# Patient Record
Sex: MALE | Race: BLACK OR AFRICAN AMERICAN | Employment: FULL TIME | URBAN - METROPOLITAN AREA
[De-identification: names, ages, dates, MRNs, and addresses within clinical notes are randomized per-mention and may not be internally consistent; named-entity substitution may affect disease eponyms.]

---

## 2018-04-03 ENCOUNTER — OFFICE VISIT (OUTPATIENT)
Dept: INTERNAL MEDICINE CLINIC | Age: 33
End: 2018-04-03

## 2018-04-03 VITALS
TEMPERATURE: 98.1 F | BODY MASS INDEX: 46.13 KG/M2 | HEIGHT: 67 IN | SYSTOLIC BLOOD PRESSURE: 207 MMHG | OXYGEN SATURATION: 98 % | DIASTOLIC BLOOD PRESSURE: 104 MMHG | RESPIRATION RATE: 20 BRPM | HEART RATE: 95 BPM | WEIGHT: 293.9 LBS

## 2018-04-03 DIAGNOSIS — I10 HTN (HYPERTENSION), MALIGNANT: Primary | ICD-10-CM

## 2018-04-03 DIAGNOSIS — H53.8 BLURRED VISION, BILATERAL: ICD-10-CM

## 2018-04-03 DIAGNOSIS — E66.01 MORBID OBESITY WITH BMI OF 45.0-49.9, ADULT (HCC): ICD-10-CM

## 2018-04-03 RX ORDER — METOPROLOL SUCCINATE 25 MG/1
25 TABLET, EXTENDED RELEASE ORAL
Qty: 30 TAB | Refills: 11 | Status: SHIPPED | OUTPATIENT
Start: 2018-04-03 | End: 2019-01-08

## 2018-04-03 RX ORDER — AMLODIPINE BESYLATE 5 MG/1
5 TABLET ORAL DAILY
Qty: 30 TAB | Refills: 11 | Status: SHIPPED | OUTPATIENT
Start: 2018-04-03 | End: 2019-01-08 | Stop reason: SDUPTHER

## 2018-04-03 NOTE — PROGRESS NOTES
SPORTS MEDICINE AND PRIMARY CARE  Jb Lira MD, [address] [address]  Phone:  [phone]  Fax: [phone]    Chief Complaint   Patient presents with    Establish Care       SUBJECTIVE:    Kumar Ramirez is a 29 y.o. male Patient comes in as a new patient, although we saw him several years ago. He complains of blurred vision. Denies other specific complaints and is seen for evaluation. He doesn't remember the last time his blood pressure was checked. Past Medical History:   Diagnosis Date    Blurred vision, bilateral     HTN (hypertension), malignant     Morbid obesity with BMI of 45.0-49.9, adult ([facility] [address]      History reviewed. No pertinent surgical history.   Not on File    REVIEW OF SYSTEMS:  General: negative for - chills or fever  ENT: negative for - headaches, nasal congestion or tinnitus  Respiratory: negative for - cough, hemoptysis, shortness of breath or wheezing  Cardiovascular : negative for - chest pain, edema, palpitations or shortness of breath  Gastrointestinal: negative for - abdominal pain, blood in stools, heartburn or nausea/vomiting  Genito-Urinary: no dysuria, trouble voiding, or hematuria  Musculoskeletal: negative for - gait disturbance, joint pain, joint stiffness or joint swelling  Neurological: no TIA or stroke symptoms  Hematologic: no bruises, no bleeding, no swollen glands  Integument: no lumps, mole changes, nail changes or rash  Endocrine:no malaise/lethargy or unexpected weight changes      Social History     Social History    Marital status: SINGLE     Spouse name: N/A    Number of children: N/A    Years of education: N/A     Social History Main Topics    Smoking status: Current Every Day Smoker    Smokeless tobacco: Never Used    Alcohol use No    Drug use: No    Sexual activity: Not Currently     Other Topics Concern    None     Social History Narrative    Medical History: KeloidObesity    Surgical History: Denies Past Surgical History    Hospitalization/Major Diagnostic Procedure: Denies Past Hospitalization    Family History: Mother: alive 37 yrs a/w Father: alive 36 yrs a/w Sister(s): alive Brother(s): alive Grandmother: alive 64 yrs Latonia Chaudhary 3 brother(s) , 2 sister(s) . Social History: Alcohol Use Patient does not use alcohol. Smoking Status Patient is a current every day smoker, Received cessation    counseling on: 11/18/2013. Marital Status: Single. Lives w ith: grandmother. Occupation/W ork: employed full time [employer] . Education/School: has highschool diploma. Mandaen: no.     History reviewed. No pertinent family history. OBJECTIVE:     Visit Vitals    BP (!) 207/104    Pulse 95    Temp 98.1 °F (36.7 °C) (Oral)    Resp 20    Ht 5' 7\" (1.702 m)    Wt 293 lb 14.4 oz (133.3 kg)    SpO2 98%    BMI 46.03 kg/m2     CONSTITUTIONAL: well , well nourished, appears age appropriate  EYES: perrla, eom intact  ENMT:moist mucous membranes, pharynx clear  NECK: supple. Thyroid normal  RESPIRATORY: Chest: clear bilaterally  CARDIOVASCULAR: Heart: regular rate and rhythm  GASTROINTESTINAL: Abdomen: soft, bowel sounds active  HEMATOLOGIC: no pathological lymph nodes palpated  MUSCULOSKELETAL: Extremities: no edema, pulse 1+   INTEGUMENT: No unusual rashes or suspicious skin lesions noted. Nails appear normal.  NEUROLOGIC: non-focal exam   MENTAL STATUS: alert and oriented, appropriate affect     No results found for any previous visit. ASSESSMENT:   1. HTN (hypertension), malignant    2. Blurred vision, bilateral    3. Morbid obesity with BMI of 45.0-49.9, adult (HCC)      BP control is grossly lacking. Will start him on a beta blocker, as well as a CCB and ask him to come back on Thursday for a blood pressure check. Will titrate his medications quickly to get his blood pressure down to normal.  Will refer him to opthalmology for evaluation of his blurred vision.   We encouraged physical activity 30 minutes five days a week and a heart healthy, weight reducing diet as discussed below. He'll return to the office on Thursday for a blood pressure check. Discussed the patient's BMI with him. The BMI follow up plan is as follows:     dietary management education, guidance, and counseling  encourage exercise  monitor weight  prescribed dietary intake    An After Visit Summary was printed and given to the patient. PLAN:  .  Orders Placed This Encounter    URINALYSIS W/ RFLX MICROSCOPIC    CBC WITH AUTOMATED DIFF    METABOLIC PANEL, COMPREHENSIVE    LIPID PANEL    HEMOGLOBIN A1C WITH EAG    AMB POC EKG ROUTINE W/ 12 LEADS, INTER & REP    2D ECHO COMPLETE ADULT (TTE) W OR WO CONTR    amLODIPine (NORVASC) 5 mg tablet    metoprolol succinate (TOPROL-XL) 25 mg XL tablet       Follow-up Disposition:  Return in about 2 days (around 4/5/2018) for bp check. ATTENTION:   This medical record was transcribed using an electronic medical records system. Although proofread, it may and can contain electronic and spelling errors. Other human spelling and other errors may be present. Corrections may be executed at a later time. Please feel free to contact us for any clarifications as needed.

## 2018-04-03 NOTE — PROGRESS NOTES
1. Have you been to the ER, urgent care clinic since your last visit? Hospitalized since your last visit? No    2. Have you seen or consulted any other health care providers outside of the [address] [place] [address] since your last visit? Include any pap smears or colon screening.  No     Complaints of vision lost

## 2018-04-03 NOTE — PATIENT INSTRUCTIONS
Body Mass Index: Care Instructions  Your Care Instructions    Body mass index (BMI) can help you see if your weight is raising your risk for health problems. It uses a formula to compare how much you weigh with how tall you are. · A BMI lower than 18.5 is considered underweight. · A BMI between 18.5 and 24.9 is considered healthy. · A BMI between 25 and 29.9 is considered overweight. A BMI of 30 or higher is considered obese. If your BMI is in the normal range, it means that you have a lower risk for weight-related health problems. If your BMI is in the overweight or obese range, you may be at increased risk for weight-related health problems, such as high blood pressure, heart disease, stroke, arthritis or joint pain, and diabetes. If your BMI is in the underweight range, you may be at increased risk for health problems such as fatigue, lower protection (immunity) against illness, muscle loss, bone loss, hair loss, and hormone problems. BMI is just one measure of your risk for weight-related health problems. You may be at higher risk for health problems if you are not active, you eat an unhealthy diet, or you drink too much alcohol or use tobacco products. Follow-up care is a key part of your treatment and safety. Be sure to make and go to all appointments, and call your doctor if you are having problems. It's also a good idea to know your test results and keep a list of the medicines you take. How can you care for yourself at home? · Practice healthy eating habits. This includes eating plenty of fruits, vegetables, whole grains, lean protein, and low-fat dairy. · If your doctor recommends it, get more exercise. Walking is a good choice. Bit by bit, increase the amount you walk every day. Try for at least 30 minutes on most days of the week. · Do not smoke. Smoking can increase your risk for health problems. If you need help quitting, talk to your doctor about stop-smoking programs and medicines. These can increase your chances of quitting for good. · Limit alcohol to 2 drinks a day for men and 1 drink a day for women. Too much alcohol can cause health problems. If you have a BMI higher than 25  · Your doctor may do other tests to check your risk for weight-related health problems. This may include measuring the distance around your waist. A waist measurement of more than 40 inches in men or 35 inches in women can increase the risk of weight-related health problems. · Talk with your doctor about steps you can take to stay healthy or improve your health. You may need to make lifestyle changes to lose weight and stay healthy, such as changing your diet and getting regular exercise. If you have a BMI lower than 18.5  · Your doctor may do other tests to check your risk for health problems. · Talk with your doctor about steps you can take to stay healthy or improve your health. You may need to make lifestyle changes to gain or maintain weight and stay healthy, such as getting more healthy foods in your diet and doing exercises to build muscle. Where can you learn more? Go to http://constantin-kailash.info/. Enter S176 in the search box to learn more about \"Body Mass Index: Care Instructions. \"  Current as of: October 13, 2016  Content Version: 11.4  © 1101-8214 Healthwise, Incorporated. Care instructions adapted under license by [employer] (which disclaims liability or warranty for this information). If you have questions about a medical condition or this instruction, always ask your healthcare professional. [address] any warranty or liability for your use of this information.

## 2018-04-03 NOTE — MR AVS SNAPSHOT
[address] [place] [address] [id number] 
[phone] Patient: Alicja Mancera MRN: [id number] :1985 Visit Information Date & Time Provider Department Dept. Phone Encounter #  
 4/3/2018 12:30 PM Kitty Modi MD [facility] Medicine and [facility] [phone] [id number] Follow-up Instructions Return in about 2 days (around 2018) for bp check. Follow-up and Disposition History Upcoming Health Maintenance Date Due  
 PAP AKA CERVICAL CYTOLOGY 2008 DTaP/Tdap/Td series (2 - Td) 4/3/2028 Allergies as of 4/3/2018  Review Complete On: 4/3/2018 By: Sun Ceballos LPN Not on File Current Immunizations  Never Reviewed No immunizations on file. Not reviewed this visit You Were Diagnosed With   
  
 Codes Comments HTN (hypertension), malignant    -  Primary ICD-10-CM: I10 
ICD-9-CM: 401.0 Blurred vision, bilateral     ICD-10-CM: H53.8 ICD-9-CM: 368.8 Morbid obesity with BMI of 45.0-49.9, adult (HCC)     ICD-10-CM: E66.01, Z68.42 
ICD-9-CM: 278.01, V85.42 Vitals BP Pulse Temp Resp Height(growth percentile) Weight(growth percentile) (!) 207/104 95 98.1 °F (36.7 °C) (Oral) 20 5' 7\" (1.702 m) 293 lb 14.4 oz (133.3 kg) SpO2 BMI Smoking Status 98% 46.03 kg/m2 Current Every Day Smoker Vitals History BMI and BSA Data Body Mass Index Body Surface Area 46.03 kg/m 2 2.51 m 2 Preferred Pharmacy Pharmacy Name Phone [id number] [address] [phone] Your Updated Medication List  
  
   
This list is accurate as of 4/3/18  2:48 PM.  Always use your most recent med list. amLODIPine 5 mg tablet Commonly known as:  Sowmya Grebe Take 1 Tab by mouth daily. metoprolol succinate 25 mg XL tablet Commonly known as:  TOPROL-XL  
 Take 1 Tab by mouth nightly. Prescriptions Sent to Pharmacy Refills  
 amLODIPine (NORVASC) 5 mg tablet 11 Sig: Take 1 Tab by mouth daily. Class: Normal  
 Pharmacy: [facility]/pharmacy [facility] [address] Ph #: [phone] Route: Oral  
 metoprolol succinate (TOPROL-XL) 25 mg XL tablet 11 Sig: Take 1 Tab by mouth nightly. Class: Normal  
 Pharmacy: [facility]/pharmacy [address] Ph #: [phone] Route: Oral  
  
We Performed the Following AMB POC EKG ROUTINE W/ 12 LEADS, INTER & REP [61839 CPT(R)] CBC WITH AUTOMATED DIFF [59397 CPT(R)] COLLECTION VENOUS BLOOD,VENIPUNCTURE [id number] CPT(R)] HEMOGLOBIN A1C WITH EAG [10446 CPT(R)] LIPID PANEL [57103 CPT(R)] METABOLIC PANEL, COMPREHENSIVE [91924 CPT(R)] URINALYSIS W/ RFLX MICROSCOPIC [30520 CPT(R)] Follow-up Instructions Return in about 2 days (around 4/5/2018) for bp check. To-Do List   
 04/03/2018 ECHO:  2D ECHO COMPLETE ADULT (TTE) W OR WO CONTR Patient Instructions Body Mass Index: Care Instructions Your Care Instructions Body mass index (BMI) can help you see if your weight is raising your risk for health problems. It uses a formula to compare how much you weigh with how tall you are. · A BMI lower than 18.5 is considered underweight. · A BMI between 18.5 and 24.9 is considered healthy. · A BMI between 25 and 29.9 is considered overweight. A BMI of 30 or higher is considered obese. If your BMI is in the normal range, it means that you have a lower risk for weight-related health problems.  If your BMI is in the overweight or obese range, you may be at increased risk for weight-related health problems, such as high blood pressure, heart disease, stroke, arthritis or joint pain, and diabetes. If your BMI is in the underweight range, you may be at increased risk for health problems such as fatigue, lower protection (immunity) against illness, muscle loss, bone loss, hair loss, and hormone problems. BMI is just one measure of your risk for weight-related health problems. You may be at higher risk for health problems if you are not active, you eat an unhealthy diet, or you drink too much alcohol or use tobacco products. Follow-up care is a key part of your treatment and safety. Be sure to make and go to all appointments, and call your doctor if you are having problems. It's also a good idea to know your test results and keep a list of the medicines you take. How can you care for yourself at home? · Practice healthy eating habits. This includes eating plenty of fruits, vegetables, whole grains, lean protein, and low-fat dairy. · If your doctor recommends it, get more exercise. Walking is a good choice. Bit by bit, increase the amount you walk every day. Try for at least 30 minutes on most days of the week. · Do not smoke. Smoking can increase your risk for health problems. If you need help quitting, talk to your doctor about stop-smoking programs and medicines. These can increase your chances of quitting for good. · Limit alcohol to 2 drinks a day for men and 1 drink a day for women. Too much alcohol can cause health problems. If you have a BMI higher than 25 · Your doctor may do other tests to check your risk for weight-related health problems. This may include measuring the distance around your waist. A waist measurement of more than 40 inches in men or 35 inches in women can increase the risk of weight-related health problems. · Talk with your doctor about steps you can take to stay healthy or improve your health. You may need to make lifestyle changes to lose weight and stay healthy, such as changing your diet and getting regular exercise. If you have a BMI lower than 18.5 · Your doctor may do other tests to check your risk for health problems. · Talk with your doctor about steps you can take to stay healthy or improve your health. You may need to make lifestyle changes to gain or maintain weight and stay healthy, such as getting more healthy foods in your diet and doing exercises to build muscle. Where can you learn more? Go to http://constantin-kailash.info/. Enter S176 in the search box to learn more about \"Body Mass Index: Care Instructions. \" Current as of: October 13, 2016 Content Version: 11.4 © 7151-7646 [employer]. Care instructions adapted under license by [employer] (which disclaims liability or warranty for this information). If you have questions about a medical condition or this instruction, always ask your healthcare professional. [address] any warranty or liability for your use of this information. Introducing [facility]! Jose Norris introduces [employer] patient portal. Now you can access parts of your medical record, email your doctor's office, and request medication refills online. 1. In your internet browser, go to [email or web address] 2. Click on the First Time User? Click Here link in the Sign In box. You will see the New Member Sign Up page. 3. Enter your [employer] Access Code exactly as it appears below. You will not need to use this code after youve completed the sign-up process. If you do not sign up before the expiration date, you must request a new code. · [employer] Access Code: [email or web address] Expires: 7/2/2018  1:49 PM 
 
4. Enter the last four digits of your Social Security Number (xxxx) and Date of Birth (mm/dd/yyyy) as indicated and click Submit. You will be taken to the next sign-up page. 5. Create a [employer] ID.  This will be your [employer] login ID and cannot be changed, so think of one that is secure and easy to remember. 6. Create a [employer] password. You can change your password at any time. 7. Enter your Password Reset Question and Answer. This can be used at a later time if you forget your password. 8. Enter your e-mail address. You will receive e-mail notification when new information is available in [address] 9. Click Sign Up. You can now view and download portions of your medical record. 10. Click the Download Summary menu link to download a portable copy of your medical information. If you have questions, please visit the Frequently Asked Questions section of the [employer] website. Remember, [employer] is NOT to be used for urgent needs. For medical emergencies, dial 911. Now available from your iPhone and Android! Please provide this summary of care documentation to your next provider. Your primary care clinician is listed as Argelia Fountain. If you have any questions after today's visit, please call [phone].

## 2018-04-04 DIAGNOSIS — E11.9 TYPE 2 DIABETES MELLITUS WITHOUT COMPLICATION, WITHOUT LONG-TERM CURRENT USE OF INSULIN (HCC): Primary | ICD-10-CM

## 2018-04-04 LAB
ALBUMIN SERPL-MCNC: 4.3 G/DL (ref 3.5–5.5)
ALBUMIN/GLOB SERPL: 1.3 {RATIO} (ref 1.2–2.2)
ALP SERPL-CCNC: 91 IU/L (ref 39–117)
ALT SERPL-CCNC: 25 IU/L (ref 0–44)
APPEARANCE UR: CLEAR
AST SERPL-CCNC: 16 IU/L (ref 0–40)
BACTERIA #/AREA URNS HPF: ABNORMAL /[HPF]
BASOPHILS # BLD AUTO: 0 X10E3/UL (ref 0–0.2)
BASOPHILS NFR BLD AUTO: 1 %
BILIRUB SERPL-MCNC: 0.3 MG/DL (ref 0–1.2)
BILIRUB UR QL STRIP: NEGATIVE
BUN SERPL-MCNC: 15 MG/DL (ref 6–20)
BUN/CREAT SERPL: 15 (ref 9–20)
CALCIUM SERPL-MCNC: 9.4 MG/DL (ref 8.7–10.2)
CASTS URNS QL MICRO: ABNORMAL /LPF
CHLORIDE SERPL-SCNC: 99 MMOL/L (ref 96–106)
CHOLEST SERPL-MCNC: 207 MG/DL (ref 100–199)
CO2 SERPL-SCNC: 23 MMOL/L (ref 18–29)
COLOR UR: YELLOW
CREAT SERPL-MCNC: 1 MG/DL (ref 0.76–1.27)
CRYSTALS URNS MICRO: ABNORMAL
EOSINOPHIL # BLD AUTO: 0.1 X10E3/UL (ref 0–0.4)
EOSINOPHIL NFR BLD AUTO: 2 %
EPI CELLS #/AREA URNS HPF: ABNORMAL /HPF
ERYTHROCYTE [DISTWIDTH] IN BLOOD BY AUTOMATED COUNT: 14.3 % (ref 12.3–15.4)
EST. AVERAGE GLUCOSE BLD GHB EST-MCNC: 157 MG/DL
GFR SERPLBLD CREATININE-BSD FMLA CKD-EPI: 101 ML/MIN/1.73
GFR SERPLBLD CREATININE-BSD FMLA CKD-EPI: 116 ML/MIN/1.73
GLOBULIN SER CALC-MCNC: 3.3 G/DL (ref 1.5–4.5)
GLUCOSE SERPL-MCNC: 114 MG/DL (ref 65–99)
GLUCOSE UR QL: ABNORMAL
HBA1C MFR BLD: 7.1 % (ref 4.8–5.6)
HCT VFR BLD AUTO: 41.4 % (ref 37.5–51)
HDLC SERPL-MCNC: 54 MG/DL
HGB BLD-MCNC: 14.1 G/DL (ref 13–17.7)
HGB UR QL STRIP: NEGATIVE
IMM GRANULOCYTES # BLD: 0 X10E3/UL (ref 0–0.1)
IMM GRANULOCYTES NFR BLD: 0 %
KETONES UR QL STRIP: NEGATIVE
LDLC SERPL CALC-MCNC: 132 MG/DL (ref 0–99)
LEUKOCYTE ESTERASE UR QL STRIP: NEGATIVE
LYMPHOCYTES # BLD AUTO: 2.8 X10E3/UL (ref 0.7–3.1)
LYMPHOCYTES NFR BLD AUTO: 32 %
MCH RBC QN AUTO: 28.4 PG (ref 26.6–33)
MCHC RBC AUTO-ENTMCNC: 34.1 G/DL (ref 31.5–35.7)
MCV RBC AUTO: 83 FL (ref 79–97)
MICRO URNS: ABNORMAL
MONOCYTES # BLD AUTO: 0.6 X10E3/UL (ref 0.1–0.9)
MONOCYTES NFR BLD AUTO: 7 %
MUCOUS THREADS URNS QL MICRO: PRESENT
NEUTROPHILS # BLD AUTO: 5 X10E3/UL (ref 1.4–7)
NEUTROPHILS NFR BLD AUTO: 58 %
NITRITE UR QL STRIP: NEGATIVE
PH UR STRIP: 5 [PH] (ref 5–7.5)
PLATELET # BLD AUTO: 431 X10E3/UL (ref 150–379)
POTASSIUM SERPL-SCNC: 4.2 MMOL/L (ref 3.5–5.2)
PROT SERPL-MCNC: 7.6 G/DL (ref 6–8.5)
PROT UR QL STRIP: ABNORMAL
RBC # BLD AUTO: 4.97 X10E6/UL (ref 4.14–5.8)
RBC #/AREA URNS HPF: ABNORMAL /HPF
SODIUM SERPL-SCNC: 141 MMOL/L (ref 134–144)
SP GR UR: 1.03 (ref 1–1.03)
TRIGL SERPL-MCNC: 106 MG/DL (ref 0–149)
UNIDENT CRYS URNS QL MICRO: PRESENT
UROBILINOGEN UR STRIP-MCNC: 0.2 MG/DL (ref 0.2–1)
VLDLC SERPL CALC-MCNC: 21 MG/DL (ref 5–40)
WBC # BLD AUTO: 8.5 X10E3/UL (ref 3.4–10.8)
WBC #/AREA URNS HPF: ABNORMAL /HPF

## 2018-04-04 RX ORDER — METFORMIN HYDROCHLORIDE 500 MG/1
500 TABLET, EXTENDED RELEASE ORAL
Qty: 30 TAB | Refills: 11 | Status: SHIPPED | OUTPATIENT
Start: 2018-04-04 | End: 2019-05-17 | Stop reason: SDUPTHER

## 2018-04-26 ENCOUNTER — TELEPHONE (OUTPATIENT)
Dept: INTERNAL MEDICINE CLINIC | Age: 33
End: 2018-04-26

## 2018-12-20 ENCOUNTER — OFFICE VISIT (OUTPATIENT)
Dept: INTERNAL MEDICINE CLINIC | Age: 33
End: 2018-12-20

## 2018-12-20 VITALS
BODY MASS INDEX: 43.76 KG/M2 | TEMPERATURE: 97.8 F | RESPIRATION RATE: 18 BRPM | SYSTOLIC BLOOD PRESSURE: 145 MMHG | WEIGHT: 278.8 LBS | HEART RATE: 65 BPM | HEIGHT: 67 IN | OXYGEN SATURATION: 98 % | DIASTOLIC BLOOD PRESSURE: 84 MMHG

## 2018-12-20 DIAGNOSIS — I34.0 MILD MITRAL INSUFFICIENCY: ICD-10-CM

## 2018-12-20 DIAGNOSIS — R06.02 SOB (SHORTNESS OF BREATH): ICD-10-CM

## 2018-12-20 DIAGNOSIS — E11.9 TYPE 2 DIABETES MELLITUS WITHOUT COMPLICATION, WITHOUT LONG-TERM CURRENT USE OF INSULIN (HCC): ICD-10-CM

## 2018-12-20 DIAGNOSIS — I51.89 GRADE II DIASTOLIC DYSFUNCTION: ICD-10-CM

## 2018-12-20 DIAGNOSIS — E66.01 MORBID OBESITY WITH BMI OF 45.0-49.9, ADULT (HCC): ICD-10-CM

## 2018-12-20 DIAGNOSIS — I10 HTN (HYPERTENSION), MALIGNANT: Primary | ICD-10-CM

## 2018-12-20 DIAGNOSIS — I27.20 PULMONARY HYPERTENSION, MODERATE TO SEVERE (HCC): ICD-10-CM

## 2018-12-20 NOTE — PROGRESS NOTES
1. Have you been to the ER, urgent care clinic since your last visit? Hospitalized since your last visit? Yes When: 12-9-18 Reason for visit: SOB    2. Have you seen or consulted any other health care providers outside of the [address] [place] [address] since your last visit? Include any pap smears or colon screening.  Yes Where: [facility]

## 2018-12-20 NOTE — PATIENT INSTRUCTIONS
Body Mass Index: Care Instructions  Your Care Instructions    Body mass index (BMI) can help you see if your weight is raising your risk for health problems. It uses a formula to compare how much you weigh with how tall you are. · A BMI lower than 18.5 is considered underweight. · A BMI between 18.5 and 24.9 is considered healthy. · A BMI between 25 and 29.9 is considered overweight. A BMI of 30 or higher is considered obese. If your BMI is in the normal range, it means that you have a lower risk for weight-related health problems. If your BMI is in the overweight or obese range, you may be at increased risk for weight-related health problems, such as high blood pressure, heart disease, stroke, arthritis or joint pain, and diabetes. If your BMI is in the underweight range, you may be at increased risk for health problems such as fatigue, lower protection (immunity) against illness, muscle loss, bone loss, hair loss, and hormone problems. BMI is just one measure of your risk for weight-related health problems. You may be at higher risk for health problems if you are not active, you eat an unhealthy diet, or you drink too much alcohol or use tobacco products. Follow-up care is a key part of your treatment and safety. Be sure to make and go to all appointments, and call your doctor if you are having problems. It's also a good idea to know your test results and keep a list of the medicines you take. How can you care for yourself at home? · Practice healthy eating habits. This includes eating plenty of fruits, vegetables, whole grains, lean protein, and low-fat dairy. · If your doctor recommends it, get more exercise. Walking is a good choice. Bit by bit, increase the amount you walk every day. Try for at least 30 minutes on most days of the week. · Do not smoke. Smoking can increase your risk for health problems. If you need help quitting, talk to your doctor about stop-smoking programs and medicines. These can increase your chances of quitting for good. · Limit alcohol to 2 drinks a day for men and 1 drink a day for women. Too much alcohol can cause health problems. If you have a BMI higher than 25  · Your doctor may do other tests to check your risk for weight-related health problems. This may include measuring the distance around your waist. A waist measurement of more than 40 inches in men or 35 inches in women can increase the risk of weight-related health problems. · Talk with your doctor about steps you can take to stay healthy or improve your health. You may need to make lifestyle changes to lose weight and stay healthy, such as changing your diet and getting regular exercise. If you have a BMI lower than 18.5  · Your doctor may do other tests to check your risk for health problems. · Talk with your doctor about steps you can take to stay healthy or improve your health. You may need to make lifestyle changes to gain or maintain weight and stay healthy, such as getting more healthy foods in your diet and doing exercises to build muscle. Where can you learn more? Go to http://constantin-kailash.info/. Enter S176 in the search box to learn more about \"Body Mass Index: Care Instructions. \"  Current as of: October 13, 2016  Content Version: 11.4  © 8338-3069 Healthwise, Incorporated. Care instructions adapted under license by [employer] (which disclaims liability or warranty for this information). If you have questions about a medical condition or this instruction, always ask your healthcare professional. [address] any warranty or liability for your use of this information.

## 2018-12-20 NOTE — PROGRESS NOTES
SPORTS MEDICINE AND PRIMARY CARE  Lina Hyde MD, [address] [address]  Phone:  [phone]  Fax: [phone]       Chief Complaint   Patient presents with   [facility] Follow Up   . SUBJECTIVE:    Kalyan Julien is a 34 y.o. male Patient returns today with known history of diabetes mellitus type 2, morbid obesity, although she lost 15 pounds, primary hypertension, and is seen for evaluation. Patient returns today after a long absence. He comes in with his grandmother, Gloria Alejandro. He brings in his paperwork which indicates he was admitted on 12/04/18 and discharged on 12/10/18 from [facility]. He was admitted to ICU on BIPAP for respiratory support and IV Cardene drip for blood pressure control. He had acute kidney injury secondary to poorly controlled blood pressure and a component of congestive heart failure. Cardiology recommended that he continue medications and that he should have aggressive treatment for congestive heart failure to relieve the pulmonary hypertension. He was subsequently weaned off BIPAP and on room air his oxygen returned to normal.  His renal dysfunction was improved. They subsequently discharged him. During the hospitalization his echocardiogram revealed ejection fraction of 60-65%, left ventricular wall thickness, grade 2 diastolic dysfunction, mild mitral regurgitation, left atrium was dilated, moderate pulmonary hypertension. He had hypokalemia, which resolved. He was not taking any of his medications at home for his diabetes. Hemoglobin A1c was 6.7. They noted he was medically noncompliant and suggested medical follow up. He was advised to stop Amlodipine and Metoprolol XL 25. He was advised to continue Metformin 500 mg b.i.d., Lisinopril 10 mg daily, Metoprolol Tartrate 50 mg, Amlodipine 10 mg, Clonidine 0.1 mg t.i.d., and aspirin 81 mg daily.   He was to see Dr. Lizzy Collazo MD in 1-2 weeks and [facility] in 1-2 weeks. Patient comes in today saying he feels better and is seen for evaluation. Current Outpatient Medications   Medication Sig Dispense Refill    metFORMIN ER (GLUCOPHAGE XR) 500 mg tablet Take 1 Tab by mouth daily (with dinner). 30 Tab 11    amLODIPine (NORVASC) 5 mg tablet Take 1 Tab by mouth daily. 30 Tab 11    metoprolol succinate (TOPROL-XL) 25 mg XL tablet Take 1 Tab by mouth nightly. 27 Tab 11     Past Medical History:   Diagnosis Date    Blurred vision, bilateral     DM2 (diabetes mellitus, type 2) ([facility] 04/03/2018    Grade II diastolic dysfunction 88/76/7158    HTN (hypertension), malignant     Morbid obesity with BMI of 45.0-49.9, adult ([facility]     Non-compliance 12/10/2018    Pulmonary hypertension, moderate to severe ([facility] 12/10/2018    chip     History reviewed. No pertinent surgical history.   Not on File      REVIEW OF SYSTEMS:  General: negative for - chills or fever  ENT: negative for - headaches, nasal congestion or tinnitus  Respiratory: negative for - cough, hemoptysis, shortness of breath or wheezing  Cardiovascular : negative for - chest pain, edema, palpitations or shortness of breath  Gastrointestinal: negative for - abdominal pain, blood in stools, heartburn or nausea/vomiting  Genito-Urinary: no dysuria, trouble voiding, or hematuria  Musculoskeletal: negative for - gait disturbance, joint pain, joint stiffness or joint swelling  Neurological: no TIA or stroke symptoms  Hematologic: no bruises, no bleeding, no swollen glands  Integument: no lumps, mole changes, nail changes or rash  Endocrine: no malaise/lethargy or unexpected weight changes      Social History     Socioeconomic History    Marital status: SINGLE     Spouse name: Not on file    Number of children: Not on file    Years of education: Not on file    Highest education level: Not on file   Tobacco Use    Smoking status: Former Smoker     Last attempt to quit: 12/14/2018     Years since quitting: 0.0    Smokeless tobacco: Never Used   Substance and Sexual Activity    Alcohol use: No    Drug use: No    Sexual activity: Not Currently   Social History Narrative    Medical History: KeloidObesity    Surgical History: Denies Past Surgical History    Hospitalization/Major Diagnostic Procedure: Denies Past Hospitalization    Family History: Mother: alive 37 yrs a/w Father: alive 36 yrs a/w Sister(s): alive Brother(s): alive Grandmother: alive 64 yrs Luisa Marely 3 brother(s) , 2 sister(s) . Social History: Alcohol Use Patient does not use alcohol. Smoking Status Patient is a current every day smoker, Received cessation    counseling on: 11/18/2013. Marital Status: Single. Lives w ith: grandmother. Occupation/W ork: employed full time [employer] . Education/School: has highschool diploma. Jewish: no.     History reviewed. No pertinent family history. OBJECTIVE:    Visit Vitals  /84   Pulse 65   Temp 97.8 °F (36.6 °C) (Oral)   Resp 18   Ht 5' 7\" (1.702 m)   Wt 278 lb 12.8 oz (126.5 kg)   SpO2 98%   BMI 43.67 kg/m²     CONSTITUTIONAL: well , well nourished, appears age appropriate  EYES: perrla, eom intact  ENMT:moist mucous membranes, pharynx clear  NECK: supple. Thyroid normal  RESPIRATORY: Chest: clear bilaterally   CARDIOVASCULAR: Heart: regular rate and rhythm  GASTROINTESTINAL: Abdomen: soft, bowel sounds active  HEMATOLOGIC: no pathological lymph nodes palpated  MUSCULOSKELETAL: Extremities: no edema, pulse 1+   INTEGUMENT: No unusual rashes or suspicious skin lesions noted. Nails appear normal.  NEUROLOGIC: non-focal exam   MENTAL STATUS: alert and oriented, appropriate affect           ASSESSMENT:  1. HTN (hypertension), malignant    2. Morbid obesity with BMI of 45.0-49.9, adult ([address]    3. Type 2 diabetes mellitus without complication, without long-term current use of insulin (HCC)    4. Pulmonary hypertension, moderate to severe ([address]    5.  Grade II diastolic dysfunction    6. Mild mitral insufficiency    7. SOB (shortness of breath)      His blood pressure control is lacking. Before we make a change we will ask him to come back in 2-3 weeks and if it remains elevated then we will either increase Lisinopril or change it to an ARB. The consideration of addition of Bidil will also be made. We continue to encourage physical activity and a heart healthy, weight reducing diet. Will assess blood sugar control with Hgb A1c and appropriate laboratory studies to see the progress of his renal failure. He will be back to see me formally in three months. Discussed the patient's BMI with him. The BMI follow up plan is as follows:     dietary management education, guidance, and counseling  encourage exercise  monitor weight  prescribed dietary intake    I have discussed the diagnosis with the patient and the intended plan as seen in the  orders above. The patient understands and agees with the plan. The patient has   received an after visit summary and questions were answered concerning  future plans  Patient labs and/or xrays were reviewed  Past records were reviewed. PLAN:  .  Orders Placed This Encounter    HEMOGLOBIN A1C WITH EAG    MICROALBUMIN, UR, RAND W/ MICROALB/CREAT RATIO    URINALYSIS W/ RFLX MICROSCOPIC    CBC WITH AUTOMATED DIFF    METABOLIC PANEL, COMPREHENSIVE    LIPID PANEL    BNP    REFERRAL TO OPHTHALMOLOGY    REFERRAL TO CARDIOLOGY       Follow-up Disposition:  Return in about 3 weeks (around 1/10/2019) for bp check. ATTENTION:   This medical record was transcribed using an electronic medical records system. Although proofread, it may and can contain electronic and spelling errors. Other human spelling and other errors may be present. Corrections may be executed at a later time. Please feel free to contact us for any clarifications as needed.

## 2018-12-21 LAB
ALBUMIN SERPL-MCNC: 4.2 G/DL (ref 3.5–5.5)
ALBUMIN/CREAT UR: 21.7 MG/G CREAT (ref 0–30)
ALBUMIN/GLOB SERPL: 1.3 {RATIO} (ref 1.2–2.2)
ALP SERPL-CCNC: 85 IU/L (ref 39–117)
ALT SERPL-CCNC: 29 IU/L (ref 0–44)
APPEARANCE UR: CLEAR
AST SERPL-CCNC: 12 IU/L (ref 0–40)
BASOPHILS # BLD AUTO: 0 X10E3/UL (ref 0–0.2)
BASOPHILS NFR BLD AUTO: 0 %
BILIRUB SERPL-MCNC: 0.2 MG/DL (ref 0–1.2)
BILIRUB UR QL STRIP: NEGATIVE
BNP SERPL-MCNC: 81 PG/ML (ref 0–100)
BUN SERPL-MCNC: 19 MG/DL (ref 6–20)
BUN/CREAT SERPL: 13 (ref 9–20)
CALCIUM SERPL-MCNC: 9.8 MG/DL (ref 8.7–10.2)
CHLORIDE SERPL-SCNC: 103 MMOL/L (ref 96–106)
CHOLEST SERPL-MCNC: 171 MG/DL (ref 100–199)
CO2 SERPL-SCNC: 23 MMOL/L (ref 20–29)
COLOR UR: YELLOW
CREAT SERPL-MCNC: 1.45 MG/DL (ref 0.76–1.27)
CREAT UR-MCNC: 171.4 MG/DL
EOSINOPHIL # BLD AUTO: 0.1 X10E3/UL (ref 0–0.4)
EOSINOPHIL NFR BLD AUTO: 2 %
ERYTHROCYTE [DISTWIDTH] IN BLOOD BY AUTOMATED COUNT: 14.2 % (ref 12.3–15.4)
EST. AVERAGE GLUCOSE BLD GHB EST-MCNC: 143 MG/DL
GLOBULIN SER CALC-MCNC: 3.3 G/DL (ref 1.5–4.5)
GLUCOSE SERPL-MCNC: 106 MG/DL (ref 65–99)
GLUCOSE UR QL: NEGATIVE
HBA1C MFR BLD: 6.6 % (ref 4.8–5.6)
HCT VFR BLD AUTO: 35.8 % (ref 37.5–51)
HDLC SERPL-MCNC: 45 MG/DL
HGB BLD-MCNC: 11.5 G/DL (ref 13–17.7)
HGB UR QL STRIP: NEGATIVE
IMM GRANULOCYTES # BLD: 0 X10E3/UL (ref 0–0.1)
IMM GRANULOCYTES NFR BLD: 0 %
KETONES UR QL STRIP: NEGATIVE
LDLC SERPL CALC-MCNC: 111 MG/DL (ref 0–99)
LEUKOCYTE ESTERASE UR QL STRIP: NEGATIVE
LYMPHOCYTES # BLD AUTO: 2.7 X10E3/UL (ref 0.7–3.1)
LYMPHOCYTES NFR BLD AUTO: 39 %
MCH RBC QN AUTO: 27.4 PG (ref 26.6–33)
MCHC RBC AUTO-ENTMCNC: 32.1 G/DL (ref 31.5–35.7)
MCV RBC AUTO: 85 FL (ref 79–97)
MICRO URNS: NORMAL
MICROALBUMIN UR-MCNC: 37.2 UG/ML
MONOCYTES # BLD AUTO: 0.5 X10E3/UL (ref 0.1–0.9)
MONOCYTES NFR BLD AUTO: 8 %
NEUTROPHILS # BLD AUTO: 3.5 X10E3/UL (ref 1.4–7)
NEUTROPHILS NFR BLD AUTO: 51 %
NITRITE UR QL STRIP: NEGATIVE
PH UR STRIP: 5.5 [PH] (ref 5–7.5)
PLATELET # BLD AUTO: 625 X10E3/UL (ref 150–379)
POTASSIUM SERPL-SCNC: 5.1 MMOL/L (ref 3.5–5.2)
PROT SERPL-MCNC: 7.5 G/DL (ref 6–8.5)
PROT UR QL STRIP: NEGATIVE
RBC # BLD AUTO: 4.2 X10E6/UL (ref 4.14–5.8)
SODIUM SERPL-SCNC: 140 MMOL/L (ref 134–144)
SP GR UR: 1.02 (ref 1–1.03)
TRIGL SERPL-MCNC: 76 MG/DL (ref 0–149)
UROBILINOGEN UR STRIP-MCNC: 0.2 MG/DL (ref 0.2–1)
VLDLC SERPL CALC-MCNC: 15 MG/DL (ref 5–40)
WBC # BLD AUTO: 6.9 X10E3/UL (ref 3.4–10.8)

## 2018-12-22 RX ORDER — FERROUS GLUCONATE 324(37.5)
324 TABLET ORAL EVERY OTHER DAY
Qty: 30 TAB | Refills: 11 | Status: SHIPPED | OUTPATIENT
Start: 2018-12-22 | End: 2020-04-15 | Stop reason: SDUPTHER

## 2019-01-08 RX ORDER — CLONIDINE HYDROCHLORIDE 0.3 MG/1
0.3 TABLET ORAL
Qty: 30 TAB | Refills: 11 | Status: SHIPPED | OUTPATIENT
Start: 2019-01-08 | End: 2019-01-11 | Stop reason: SDUPTHER

## 2019-01-08 RX ORDER — METOPROLOL SUCCINATE 50 MG/1
50 TABLET, EXTENDED RELEASE ORAL
Qty: 30 TAB | Refills: 11 | Status: SHIPPED | OUTPATIENT
Start: 2019-01-08 | End: 2019-06-26

## 2019-01-08 RX ORDER — LOSARTAN POTASSIUM 100 MG/1
100 TABLET ORAL DAILY
Qty: 30 TAB | Refills: 11 | Status: SHIPPED | OUTPATIENT
Start: 2019-01-08 | End: 2020-04-15 | Stop reason: SDUPTHER

## 2019-01-08 RX ORDER — AMLODIPINE BESYLATE 5 MG/1
5 TABLET ORAL DAILY
Qty: 30 TAB | Refills: 11 | Status: SHIPPED | OUTPATIENT
Start: 2019-01-08 | End: 2019-02-26 | Stop reason: SDUPTHER

## 2019-01-11 ENCOUNTER — CLINICAL SUPPORT (OUTPATIENT)
Dept: INTERNAL MEDICINE CLINIC | Age: 34
End: 2019-01-11

## 2019-01-11 VITALS
WEIGHT: 283 LBS | BODY MASS INDEX: 44.32 KG/M2 | DIASTOLIC BLOOD PRESSURE: 116 MMHG | SYSTOLIC BLOOD PRESSURE: 175 MMHG | HEART RATE: 90 BPM

## 2019-01-11 DIAGNOSIS — G47.33 OSA (OBSTRUCTIVE SLEEP APNEA): Primary | ICD-10-CM

## 2019-01-11 DIAGNOSIS — I10 HTN (HYPERTENSION), MALIGNANT: Primary | ICD-10-CM

## 2019-01-11 RX ORDER — CLONIDINE HYDROCHLORIDE 0.3 MG/1
0.3 TABLET ORAL
Qty: 30 TAB | Refills: 11 | Status: SHIPPED | OUTPATIENT
Start: 2019-01-11 | End: 2020-01-29 | Stop reason: SDUPTHER

## 2019-01-11 NOTE — PROGRESS NOTES
Pt came in today for BP check. Pt states he is taking   Amlodipine 5 mg 1 tab PO daily  Losartan 100 mg 1 tab PO daily  Metoprolol Succinate 50 mg 1 tab PO @ night   Weight- 283lb   Pulse- 90  BP- 175/116  Per Dr. Catrachita Ashby pt is to start taking Clonidine 0.3 mg 1 tab PO @ night along with other blood pressure medications listed above. Pt is to return in 1 week for another bp check.      Beverley Meza

## 2019-01-21 ENCOUNTER — CLINICAL SUPPORT (OUTPATIENT)
Dept: INTERNAL MEDICINE CLINIC | Age: 34
End: 2019-01-21

## 2019-01-21 VITALS
DIASTOLIC BLOOD PRESSURE: 133 MMHG | BODY MASS INDEX: 43.45 KG/M2 | WEIGHT: 277.4 LBS | SYSTOLIC BLOOD PRESSURE: 196 MMHG | HEART RATE: 80 BPM

## 2019-01-21 DIAGNOSIS — I10 HTN (HYPERTENSION), MALIGNANT: Primary | ICD-10-CM

## 2019-01-21 RX ORDER — MINOXIDIL 2.5 MG/1
2.5 TABLET ORAL DAILY
Qty: 602 TAB | Refills: 2 | Status: SHIPPED | OUTPATIENT
Start: 2019-01-21 | End: 2019-03-27

## 2019-01-21 RX ORDER — FUROSEMIDE 40 MG/1
40 TABLET ORAL DAILY
Qty: 30 TAB | Refills: 3 | Status: SHIPPED | OUTPATIENT
Start: 2019-01-21 | End: 2019-03-04 | Stop reason: SDUPTHER

## 2019-01-21 NOTE — PROGRESS NOTES
Pt came in today for a bp check. Pt states he is taking  Amlodipine 5 mg 1 tab PO daily  Clonidine 0.3 mg 1 tab PO nightly  Losartan 100 mg 1 tab PO daily  Metoprolol Succinate 50 mg 1 tab PO nightly  Weight- 277.4lb  Pulse- 80  BP- 196/133  Per Dr. Tien Kirkpatrick pt is to continue taking bp meds as directed above as well as add Minoxidil 2.5 mg 1 tab PO 2x daily. Pt is to return in 1 week for another bp check and bmp.     Rnean Del Cid

## 2019-01-28 ENCOUNTER — CLINICAL SUPPORT (OUTPATIENT)
Dept: INTERNAL MEDICINE CLINIC | Age: 34
End: 2019-01-28

## 2019-01-28 VITALS
BODY MASS INDEX: 42.99 KG/M2 | HEART RATE: 104 BPM | WEIGHT: 274.5 LBS | SYSTOLIC BLOOD PRESSURE: 176 MMHG | DIASTOLIC BLOOD PRESSURE: 104 MMHG

## 2019-01-28 DIAGNOSIS — I10 HTN (HYPERTENSION), MALIGNANT: Primary | ICD-10-CM

## 2019-01-28 NOTE — PROGRESS NOTES
Pt came in today for a bp check. Pt states he is taking   Amlodipine 5 mg 1 tab PO daily  Clonidine 0.3 mg 1 tab PO @ night  Losartan 100 mg 1 tab PO daily   Metoprolol Succinate 50 mg 1 tab PO @ night  Minoxidil 2.5 mg 1 tab PO daily  Weight- 274.5lb  Pulse- 104  BP- 176/104  Per Dr. Christen Cabrales pt is to start taking minoxidil 2.5 mg 1 tab PO 2x daily as well as continue other bp meds directed above. Pt is to return in 2 weeks for another bp check.        Marylene Browns

## 2019-02-11 ENCOUNTER — CLINICAL SUPPORT (OUTPATIENT)
Dept: INTERNAL MEDICINE CLINIC | Age: 34
End: 2019-02-11

## 2019-02-11 VITALS — SYSTOLIC BLOOD PRESSURE: 180 MMHG | DIASTOLIC BLOOD PRESSURE: 130 MMHG

## 2019-02-11 DIAGNOSIS — I10 HTN (HYPERTENSION), MALIGNANT: Primary | ICD-10-CM

## 2019-02-11 NOTE — PROGRESS NOTES
Chief Complaint   Patient presents with    Blood Pressure Check     Patient is here for a Blood pressure check. BP (!) 200/120    Visit Vitals  BP (!) 174/109     Visit Vitals  BP (!) 180/130      Per patient he is taking:  Amlodipine 5 mg 2 tab daily    Clonidine 0.3mg 1 tab daily   Losartan 100 mg   Metoprolol Succinate 5 mg 1 tab daily   Minoxidil 2.5mg 1 tab twice a day     Patient blood pressure readings was taken 10 mintues  between each reading. Dr. Christen Cabrales did go in to see patient to inform him the risk of his BP readings. Per Dr. Christen Cabrales patient is to increase Minoxidil 2.5mg to 1 tab 3 times a day and return in 1 week for a follow up.

## 2019-02-18 ENCOUNTER — CLINICAL SUPPORT (OUTPATIENT)
Dept: INTERNAL MEDICINE CLINIC | Age: 34
End: 2019-02-18

## 2019-02-18 VITALS — WEIGHT: 283.3 LBS | BODY MASS INDEX: 44.37 KG/M2

## 2019-02-18 DIAGNOSIS — I10 HTN (HYPERTENSION), MALIGNANT: Primary | ICD-10-CM

## 2019-02-18 RX ORDER — METOPROLOL SUCCINATE 100 MG/1
100 TABLET, EXTENDED RELEASE ORAL
COMMUNITY
End: 2019-02-18 | Stop reason: SDUPTHER

## 2019-02-18 RX ORDER — METOPROLOL SUCCINATE 100 MG/1
100 TABLET, EXTENDED RELEASE ORAL
Qty: 30 TAB | Refills: 11 | Status: SHIPPED | OUTPATIENT
Start: 2019-02-18 | End: 2020-03-11 | Stop reason: SDUPTHER

## 2019-02-18 NOTE — PROGRESS NOTES
"11/16/2017       RE: Jigna Allen  [address] [place] [address]     Dear Colleague,    Thank you for referring your patient, Jigna Allen, to the [facility] at [facility]. Please see a copy of my visit note below.    Palliative Care Clinical Social Work Return Appointment    PLEASE NOTE:  THIS IS A MENTAL HEALTH NOTE.  OTHER PROVIDERS VIEWING THIS NOTE SHOULD USE THIS ONLY FOR UNDERSTANDING THE CONTEXT OF THE PATIENT S EXPERIENCE.  TOPICS DESCRIBED IN THIS NOTE SHOULD NOT BE REFERENCED TO THE PATIENT BY MEDICAL PROVIDERS.    Jigna (\"Kwame\") is an 82 year old woman with history of sarcoma, seen today for a return psychotherapy appointment to address Generalized Anxiety Disorder, depressed mood, and fatigue.    Mental Status Exam:(List all that apply)      Appearance: Appropriate      Eye Contact: Good       Orientation: Yes, x4      Mood:  Reflective, worried      Affect: Appropriate, full range      Thought Content: Clear         Thought Form: Logical      Psychomotor Behavior: Normal    Visit summary: Preparing for winter in [place] with  and mother in law. This is last appt for now and will reschedule on return in spring. I also offered that she can call for support while she is in [place], if needed. Self hypnosis training provided today with focus on developing automatic coping thoughts to self soothe and cope with caregiving role. Review of psychoeducation related to acceptance and committed action approaches to living with chronic anxiety.    Mental Health (thoughts, feelings, actions, symptoms): Jigna processes difficult aspects of caregiving for her mother in law. She has returned and is living with Jigna and her . They will leave for [place] this coming weekend. She describes using cognitive coping through working on not arguing with MIL about comments/complaints ie about choice of TV station or what is available to eat, but internally " Chief Complaint   Patient presents with    Blood Pressure Check     Patient in office for bp check. Patient states that he is taking amlodipine 5mg, clonidine 0.3 mg (qhs), Lasix 40 mg, losartan 100 mg, metoprolol 50 mg XL, and minoxidil 2.5 mg.      W: 283.3lb  BP: 162/88  P: 108     Per Dr. Coy Hobbs, he advises patient to increase metoprolol to 100 mg daily and minoxidil to tid, and return to office in two weeks for bp check. "responding as part of helping maintain her own peace of mind. Her MIL requests frequent involvement and attention from Jigna, wanting to go with her when she goes out, etc. And expressing feeling that she is in the way and missing her past independence.    Further guidance offered re: cognitive coping including to step back from specifics and reflect on the overall perceived message from MIL (\"things are not OK, you are not OK or enough, I need more, I am dissatisfied\") and useful cognitive coping responses in terms of overall perspective (\"I am OK, I am doing enough; although things are not ideal from her point of view, MIL is safe and has her needs met\"; \"although MIL is not aware of or focused on this, me maintaining my sanity and having varied activities each day are important for me, my , and my kids\").    Jigna has been advocating for a caregiver to be with MIL 5 hours per day. Her  has agreed and they are negotiating details with a specific person in [place]. She is receptive to continued encouragement that she add positive activities to her life in [place], despite barriers. She has been very much enjoying choir here in [place] and plans to restart in spring when they return. This took action against feelings of anxiety and avoidance and she did find it rewarding and helpful.    Discussion of \"serenity prayer\" and reflection on what she can influence to help herself maintain sanity, things she cannot control including the opinions and behavior of her MIL, and she also talks about benefit of a recent prayer [place] and feeling a connection with the Devika and especially the Holy Spirit, trust that perhaps God will help with this difficult experience.     Jigna expresses that it is discouraging to imagine she may have anxiety as part of her life for long term. We discuss option of acceptance of symptoms of anxiety along with attention shift to activities that bring meaning and michelle, and curiosity over time about " "how the anxiety may be present or not as present. Either way, living a life that she feels good about is the goal, though not always easy to achieve.    Jigna reflects on the past 7 months of therapy. Consolidation and termination discussion today including reflection on her increase in social activity and creative activity during the summer and fall. Jigna expresses that learning about the connection between body and mind has been new and interesting for her.    Body-Mind Skills Education:    Clinical Hypnosis Training:  Clinical self hypnosis training was discussed and explained and any questions were answered.  Preferences were determined.  Patient was then led in a clinical self hypnosis training.  Method of induction:  Eyes closing and progressive muscle relaxation with image of \"warm wave\" and \"releasing anything you don't need as you relax\"  Method of deepening:  Internal elevator, 7 floors, stopping at each floor to sense \"how it is to be on the 3rd floor today etc\"  Techniques used/taught:  Wonderful place and inviting automatic helpful thoughts. Wonderful place is lake, on the shore, at sunset, waves lapping shore, resting on a blanket, taste of elderberry juice in 7up, a duck going by. Helpful thoughts identified are \"I'm OK\"/general sense of OKness and \"I can find some creative ways to keep myself going.\" Suggestions given for ability to return to this place and state anytime she chooses.  Method of returning patient to usual awake/alert state:  Elevator up, move feet, hands, shoulders, open eyes  Training session audio recorded:  No - Jigna declines recording, tells me she does not use CDs well, and that she feels she will be able to remember and use this exercise.  Patient report following training session:  \"That was very pleasant, really nice. I felt that I was able to really relax. I don't know if I was in a deep state, but I felt really good during the exercise and I feel good and relaxed now. I " "think I will be able to do this myself.\" Affect bright, breathing appeared to slow, steady, and relax over course of exercise.    Coping:   Helpful activities: Exercise (at home, and potentially at Y including swimming, bike, and/or ); social activities (time with friends, choir, volunteering at [employer], intellectual or professional activities)    Helpful cognitions: \"Even though I sometimes feel trapped or powerless, I do have choices\" - \"I'm OK\" - \"Things may be OK\" - \"I can find some creative ways to keep myself going\"    Unhelpful, triggering of exacerbating factors: Dynamic of decisionmaking with  who prefers to continue caregiving in their home for MIL; MIL statements perceives as critical; 24/7 caregiving for MIL during parts of the year; avoidance due to anxiety  (ie of driving, social settings); significant knee problems limiting activities    Adjustment to Illness: Not focus today.    Relationships: See above - caregiving role for MIL is focus today.    End of Life and Advance Care Planning: Not focus today.    Main therapeutic interventions provided this session include:  Cognitive Behavioral Therapy interventions (psychoeducation, cognitive reframing runhelpful thought patterns, and cognitive coping skills review); Motivational Interviewing interventions; Body-Mind skills education (self hypnosis training with cognitive therapy focus)    Plan:  Jigna will return for psychotherapy with palliative care focus in spring 2018 as needed. She is welcome to contact me by phone if needed while in [place] this winter. She will continue use of cognitive coping, committed actions aligned with values, and body-mind skills including mindfulness, progressive muscle relaxation, and self hypnosis.    Time spent with patient/family:  50 minutes (Start 10:05am, end 10:55am)    Palliative Care Counseling Treatment Plan    Client's Name: Jigna Valdez  YOB: 1935     Date: 5/31/2017    Initial DSM5 " Diagnoses:   296.22 Major Depressive Disorder, Single Episode, Moderate _  300.02 (F41.1) Generalized Anxiety Disorder      Referral / Collaboration:  .Depending on needs over time, referral to a community therapist may be indicated.      Anticipated number of sessions to complete episode of care:  12      Treatment Goal(s)  Date Goal Dates  Reviewed Status   5/31/2017 Goal 1:  Client will develop effective strategies for anxiety following treatment for sarcoma.     Continued    Objective #1A (Client Action)  Client will Identify triggers for anxiety/panic attacks, Identify early signs/symptoms of anxiety and Practice self awareness exercises.    Intervention(s)  Therapist will Provide psychoeducation and Facilitate processing of thoughts and feelings .    Continued    Objective #1B  Client will Use/practice relaxation strategies and Identify effective coping strategies.    Intervention(s)  Therapist will Provide psychoeducation, Provide behavioral intervention training and Facilitate structured problem solving.    Continued    Objective #1C  Client will Effectively communicate with medical provider re needed information and Effectively communicate with family/friends re needs.    Intervention(s)  Therapist will Provide psychoeducation and Facilitate processing of thoughts and feelings.    Continued       Date Goal Dates  Reviewed Status   5/31/2017 Goal 2:  Client will increase michelle in her life and develop effective strategies for coping with depressed mood and other difficult emotions.    Continued    Objective #2A (Client Action)  Client will Identify triggers/early signs of depressed mood.    Intervention(s) (Therapist Action)  Therapist will provide psychoeducation and Facilitate processing of thoughts and feelings.    Continued    Objective #2B  Client will Follow realistic exercise plan, Identify activities that provide comfort and/or pleasure, Participate in activities that provide comfort and/or pleasure and  Identify an activity that would provide meaning/contribute to feeling of self worth, and effectively communicate needs to others. Develop new goals and increase productive or creative daily activities. Over time, increase frequency of seeing friends and family members.    Intervention(s)  Therapist will Provide psychoeducation, Facilitate processing of thoughts and feelings and Facilitate structured problem solving.    Completed - But to continue in future as needed    Objective #2C  Client will Participate in self-regulation practice (e.g.: meditation, relaxation exercises, self hypnosis) per day and/or gratitude practices.    Intervention(s)  Therapist will Provide psychoeducation and Provide behavioral intervention training.    Objective #2D  Client will increase self awareness and options for relating to difficult emotions. Added 8/16/2017    Intervention(s)  Therapist will Provide psychoeducation and Provide behavioral intervention training.  Continued       Date Goal Dates  Reviewed Status   5/31/2017 Goal 3:  Client will make progress toward increased wellbeing and health, including working with other health care providers as needed.    Continued    Objective #3A (Client Action)  Client will Communicate effectively with medical provider re needed information for example related to sleep and diet/ weight-loss concerns.    Intervention(s) (Therapist Action)  Therapist will Make referrals to appropriate colleagues as needed, Facilitate processing of thoughts and feelings and Facilitate structured problem solving.   Completed - but to continue as needed     Objective #3B (Client Action)  Client will improve sleep, with long term goal to sleep well for 7 - 8 hours each night.    Intervention(s) (Therapist Action)  Therapist will provide Cognitive Behavioral Therapy for Insomnia, review sleep hygiene options, and provide structured problem solving. Therapist will also address anxiety and depression with goal to  assist with  Continued - but considering completion    Objective #3C (Client Action)  Client will continue to increase physical activity (adding stretching and faster walking 10 min per day in addition to daily slow walk) and adjust diet as advised by dietitian and physician, toward return to healthy weight and energy level.    Intervention(s) (Therapist Action)  Therapist will provide structured problem solving, psychoeducation, and bibliotherapy and referrals as appropriate.  Continued - On pause due to right knee arthritis/inflammation       Client has reviewed and agreed to the above plan on June 7, 2017.    JOURDAN Chilel, LICZOHRA      DO NOT SEND ANY LETTERS    Again, thank you for allowing me to participate in the care of your patient.      Sincerely,    CATHERINE Lancaster

## 2019-02-26 RX ORDER — AMLODIPINE BESYLATE 10 MG/1
10 TABLET ORAL DAILY
Qty: 30 TAB | Refills: 11 | Status: SHIPPED | OUTPATIENT
Start: 2019-02-26 | End: 2019-03-04 | Stop reason: SDUPTHER

## 2019-03-04 ENCOUNTER — CLINICAL SUPPORT (OUTPATIENT)
Dept: INTERNAL MEDICINE CLINIC | Age: 34
End: 2019-03-04

## 2019-03-04 VITALS
HEART RATE: 88 BPM | DIASTOLIC BLOOD PRESSURE: 113 MMHG | WEIGHT: 274.9 LBS | HEIGHT: 67 IN | SYSTOLIC BLOOD PRESSURE: 174 MMHG | BODY MASS INDEX: 43.15 KG/M2

## 2019-03-04 DIAGNOSIS — I10 HTN (HYPERTENSION), MALIGNANT: Primary | ICD-10-CM

## 2019-03-04 RX ORDER — FUROSEMIDE 40 MG/1
40 TABLET ORAL DAILY
Qty: 30 TAB | Refills: 3 | Status: SHIPPED | OUTPATIENT
Start: 2019-03-04 | End: 2020-04-15 | Stop reason: SDUPTHER

## 2019-03-04 RX ORDER — AMLODIPINE BESYLATE 10 MG/1
10 TABLET ORAL DAILY
Qty: 30 TAB | Refills: 11 | Status: SHIPPED | OUTPATIENT
Start: 2019-03-04 | End: 2019-03-27 | Stop reason: SDUPTHER

## 2019-03-04 NOTE — PROGRESS NOTES
Pt came in today for a bp check. Pt states he is taking   Clonidine 0.3 mg 1 tab PO nightly  Lasix 40 mg 1 tab PO daily  Metoprolol Succinate 100 mg 1 tab PO nightly  Minoxidil 2.5 mg 1 tab 3x daily  Losartan 100 mg 1 tab PO daily  Pt states he has amlodipine that he is suppose to take 10mg 2x daily but has been unable to take it because he ran out. Weight-274.9lb  Pulse-88  BP- 174/113  Per Dr. Cherie Richardson pt is to start amlodipine 10 mg 2x daily as well as continue other bp meds with no changes. Pt is to return in 2 weeks for bp check.     Hank Sanchez

## 2019-03-27 ENCOUNTER — OFFICE VISIT (OUTPATIENT)
Dept: INTERNAL MEDICINE CLINIC | Age: 34
End: 2019-03-27

## 2019-03-27 VITALS
OXYGEN SATURATION: 97 % | SYSTOLIC BLOOD PRESSURE: 155 MMHG | RESPIRATION RATE: 20 BRPM | HEIGHT: 67 IN | TEMPERATURE: 97.8 F | BODY MASS INDEX: 42.96 KG/M2 | WEIGHT: 273.7 LBS | DIASTOLIC BLOOD PRESSURE: 89 MMHG | HEART RATE: 89 BPM

## 2019-03-27 DIAGNOSIS — Z13.31 SCREENING FOR DEPRESSION: ICD-10-CM

## 2019-03-27 DIAGNOSIS — E66.01 MORBID OBESITY WITH BMI OF 45.0-49.9, ADULT (HCC): ICD-10-CM

## 2019-03-27 DIAGNOSIS — E11.9 TYPE 2 DIABETES MELLITUS WITHOUT COMPLICATION, WITHOUT LONG-TERM CURRENT USE OF INSULIN (HCC): ICD-10-CM

## 2019-03-27 DIAGNOSIS — R06.02 SOB (SHORTNESS OF BREATH): ICD-10-CM

## 2019-03-27 DIAGNOSIS — Z00.00 MEDICARE ANNUAL WELLNESS VISIT, SUBSEQUENT: Primary | ICD-10-CM

## 2019-03-27 DIAGNOSIS — E66.01 OBESITY, MORBID (HCC): ICD-10-CM

## 2019-03-27 DIAGNOSIS — I51.89 GRADE II DIASTOLIC DYSFUNCTION: ICD-10-CM

## 2019-03-27 DIAGNOSIS — Z13.39 SCREENING FOR ALCOHOLISM: ICD-10-CM

## 2019-03-27 DIAGNOSIS — I34.0 MILD MITRAL INSUFFICIENCY: ICD-10-CM

## 2019-03-27 DIAGNOSIS — I27.20 PULMONARY HYPERTENSION, MODERATE TO SEVERE (HCC): ICD-10-CM

## 2019-03-27 RX ORDER — MINOXIDIL 2.5 MG/1
5 TABLET ORAL DAILY
Qty: 120 TAB | Refills: 4 | Status: SHIPPED | OUTPATIENT
Start: 2019-03-27 | End: 2019-05-08

## 2019-03-27 RX ORDER — AMLODIPINE BESYLATE 10 MG/1
10 TABLET ORAL DAILY
Qty: 30 TAB | Refills: 11 | Status: SHIPPED | OUTPATIENT
Start: 2019-03-27 | End: 2020-04-15 | Stop reason: SDUPTHER

## 2019-03-27 NOTE — PROGRESS NOTES
1. Have you been to the ER, urgent care clinic since your last visit? Hospitalized since your last visit? No 
 
2. Have you seen or consulted any other health care providers outside of the [address] [place] [address] since your last visit? Include any pap smears or colon screening. No  
 
Wants to discuss medication Wants referral to dermatology This is the Subsequent Medicare Annual Wellness Exam, performed 12 months or more after the Initial AWV or the last Subsequent AWV I have reviewed the patient's medical history in detail and updated the computerized patient record. History Past Medical History:  
Diagnosis Date  Blurred vision, bilateral   
 DM2 (diabetes mellitus, type 2) ([facility] 04/03/2018  Grade II diastolic dysfunction 77/32/0728  
 HTN (hypertension), malignant  Morbid obesity with BMI of 45.0-49.9, adult ([facility]  Non-compliance 12/10/2018  Pulmonary hypertension, moderate to severe ([facility] 12/10/2018  
 chip History reviewed. No pertinent surgical history. Current Outpatient Medications Medication Sig Dispense Refill  amLODIPine (NORVASC) 10 mg tablet Take 1 Tab by mouth daily. 30 Tab 11  
 furosemide (LASIX) 40 mg tablet Take 1 Tab by mouth daily. 30 Tab 3  
 metoprolol succinate (TOPROL-XL) 100 mg tablet Take 1 Tab by mouth nightly. 30 Tab 11  
 minoxidil (LONITEN) 2.5 mg tablet Take 1 Tab by mouth daily. 602 Tab 2  cloNIDine HCl (CATAPRES) 0.3 mg tablet Take 1 Tab by mouth nightly. 30 Tab 11  
 losartan (COZAAR) 100 mg tablet Take 1 Tab by mouth daily. 30 Tab 11  
 ferrous gluconate 324 mg (37.5 mg iron) tablet Take 1 Tab by mouth every other day. 30 Tab 11  
 metFORMIN ER (GLUCOPHAGE XR) 500 mg tablet Take 1 Tab by mouth daily (with dinner). 30 Tab 11  
 metoprolol succinate (TOPROL-XL) 50 mg XL tablet Take 1 Tab by mouth nightly. 30 Tab 11 Not on File History reviewed. No pertinent family history. Social History Tobacco Use  
  Smoking status: Former Smoker Last attempt to quit: 2018 Years since quittin.2  Smokeless tobacco: Never Used Substance Use Topics  Alcohol use: No  
 
Patient Active Problem List  
Diagnosis Code  Obesity, morbid ([address] [facility] E66.01  
 HTN (hypertension), malignant I10  Morbid obesity with BMI of 45.0-49.9, adult ([facility]) E66.01, Z68.42  Blurred vision, bilateral H53.8  DM2 (diabetes mellitus, type 2) ([facility]) E11.9  
 Pulmonary hypertension, moderate to severe ([facility]) I27.20  Grade II diastolic dysfunction T65.1  Mild mitral insufficiency I34.0 Depression Risk Factor Screening:  
 
3 most recent PHQ Screens 3/27/2019 Little interest or pleasure in doing things Not at all Feeling down, depressed, irritable, or hopeless Not at all Total Score PHQ 2 0 Alcohol Risk Factor Screening: You do not drink alcohol or very rarely. Functional Ability and Level of Safety:  
Hearing Loss Hearing is good. Activities of Daily Living The home contains: no safety equipment. Patient does total self care Fall Risk No flowsheet data found. Abuse Screen Patient is not abused Cognitive Screening Evaluation of Cognitive Function: 
Has your family/caregiver stated any concerns about your memory: yes Normal 
 
Patient Care Team  
Patient Care Team: 
Lizz Madsen MD as PCP - General (Internal Medicine) Assessment/Plan Education and counseling provided: 
Are appropriate based on today's review and evaluation Health Maintenance Due Topic Date Due  
 EYE EXAM RETINAL OR DILATED  1997  MEDICARE YEARLY EXAM  2018

## 2019-03-27 NOTE — PATIENT INSTRUCTIONS
Medicare Wellness Visit, Male The best way to live healthy is to have a lifestyle where you eat a well-balanced diet, exercise regularly, limit alcohol use, and quit all forms of tobacco/nicotine, if applicable. Regular preventive services are another way to keep healthy. Preventive services (vaccines, screening tests, monitoring & exams) can help personalize your care plan, which helps you manage your own care. Screening tests can find health problems at the earliest stages, when they are easiest to treat. [facility] follows the current, evidence-based guidelines published by the [facility] Rowan ([facility]) when recommending preventive services for our patients. Because we follow these guidelines, sometimes recommendations change over time as research supports it. (For example, a prostate screening blood test is no longer routinely recommended for men with no symptoms.) Of course, you and your doctor may decide to screen more often for some diseases, based on your risk and co-morbidities (chronic disease you are already diagnosed with). Preventive services for you include: - Medicare offers their members a free annual wellness visit, which is time for you and your primary care provider to discuss and plan for your preventive service needs. Take advantage of this benefit every year! 
-All adults over age 72 should receive the recommended pneumonia vaccines. Current USPSTF guidelines recommend a series of two vaccines for the best pneumonia protection.  
-All adults should have a flu vaccine yearly and an ECG.  All adults age 61 and older should receive a shingles vaccine once in their lifetime.   
-All adults age 38-68 who are overweight should have a diabetes screening test once every three years.  
-Other screening tests & preventive services for persons with diabetes include: an eye exam to screen for diabetic retinopathy, a kidney function test, a foot exam, and stricter control over your cholesterol.  
-Cardiovascular screening for adults with routine risk involves an electrocardiogram (ECG) at intervals determined by the provider.  
-Colorectal cancer screening should be done for adults age 54-65 with no increased risk factors for colorectal cancer. There are a number of acceptable methods of screening for this type of cancer. Each test has its own benefits and drawbacks. Discuss with your provider what is most appropriate for you during your annual wellness visit. The different tests include: colonoscopy (considered the best screening method), a fecal occult blood test, a fecal DNA test, and sigmoidoscopy. 
-All adults born between [facility] should be screened once for Hepatitis C. 
-An Abdominal Aortic Aneurysm (AAA) Screening is recommended for men age 73-68 who has ever smoked in their lifetime. Here is a list of your current Health Maintenance items (your personalized list of preventive services) with a due date: 
Health Maintenance Due Topic Date Due Carlee Steven Eye Exam  11/11/1997 Carlee Steven Annual Well Visit  05/13/2018

## 2019-03-27 NOTE — PROGRESS NOTES
[facility] Orestes Chopra MD, [address] [address] Phone:  [phone]  Fax: [phone] Chief Complaint Patient presents with Fransisco Crocker Annual Wellness Visit Warren Calabrese SUBJECTIVE: 
  Trey Brown is a 34 y.o. male Patient returns today with known history of diabetes mellitus type 2, grade 2 diastolic dysfunction, primary hypertension, morbid obesity, pulmonary hypertension, moderate to severe, and is seen for evaluation. Patient returns today stating __________ Minoxidil. Dr. Agnelica Apple increased the dose to two twice a day, __________ because it was only one twice a day. He has not had his blood pressure checked since then. Patient is seen for evaluation. He also is not taking Amlodipine or Minoxidil because he is out. Current Outpatient Medications Medication Sig Dispense Refill  minoxidil (LONITEN) 2.5 mg tablet Take 2 Tabs by mouth daily. 120 Tab 4  
 amLODIPine (NORVASC) 10 mg tablet Take 1 Tab by mouth daily. 30 Tab 11  
 furosemide (LASIX) 40 mg tablet Take 1 Tab by mouth daily. 30 Tab 3  
 metoprolol succinate (TOPROL-XL) 100 mg tablet Take 1 Tab by mouth nightly. 30 Tab 11  
 cloNIDine HCl (CATAPRES) 0.3 mg tablet Take 1 Tab by mouth nightly. 30 Tab 11  
 losartan (COZAAR) 100 mg tablet Take 1 Tab by mouth daily. 30 Tab 11  
 ferrous gluconate 324 mg (37.5 mg iron) tablet Take 1 Tab by mouth every other day. 30 Tab 11  
 metFORMIN ER (GLUCOPHAGE XR) 500 mg tablet Take 1 Tab by mouth daily (with dinner). 30 Tab 11  
 metoprolol succinate (TOPROL-XL) 50 mg XL tablet Take 1 Tab by mouth nightly. 30 Tab 11 Past Medical History:  
Diagnosis Date  Blurred vision, bilateral   
 DM2 (diabetes mellitus, type 2) ([facility] 04/03/2018  Grade II diastolic dysfunction 62/58/5857  
 HTN (hypertension), malignant  Morbid obesity with BMI of 45.0-49.9, adult ([facility]  Non-compliance 12/10/2018  Pulmonary hypertension, moderate to severe ([facility] 12/10/2018  
 chip History reviewed. No pertinent surgical history. Not on File REVIEW OF SYSTEMS: 
General: negative for - chills or fever ENT: negative for - headaches, nasal congestion or tinnitus Respiratory: negative for - cough, hemoptysis, shortness of breath or wheezing Cardiovascular : negative for - chest pain, edema, palpitations or shortness of breath Gastrointestinal: negative for - abdominal pain, blood in stools, heartburn or nausea/vomiting Genito-Urinary: no dysuria, trouble voiding, or hematuria Musculoskeletal: negative for - gait disturbance, joint pain, joint stiffness or joint swelling Neurological: no TIA or stroke symptoms Hematologic: no bruises, no bleeding, no swollen glands Integument: no lumps, mole changes, nail changes or rash Endocrine: no malaise/lethargy or unexpected weight changes Social History Socioeconomic History  Marital status: SINGLE Spouse name: Not on file  Number of children: Not on file  Years of education: Not on file  Highest education level: Not on file Tobacco Use  Smoking status: Former Smoker Last attempt to quit: 2018 Years since quittin.2  Smokeless tobacco: Never Used Substance and Sexual Activity  Alcohol use: No  
 Drug use: No  
 Sexual activity: Not Currently Social History Narrative Medical History: Bianka Flow Surgical History: Denies Past Surgical History Hospitalization/Major Diagnostic Procedure: Denies Past Hospitalization Family History: Mother: alive 37 yrs a/w Father: alive 36 yrs a/w Sister(s): alive Brother(s): alive Grandmother: alive 64 yrs Marielos Mccartney 3 brother(s) , 2 sister(s) . Social History: Alcohol Use Patient does not use alcohol. Smoking Status Patient is a current every day smoker, Received cessation  
 counseling on: 2013. Marital Status: Single.  Lives w ith: grandmother. Occupation/W ork: employed full time [employer] . Education/School: has highschool diploma. Druze: no.  
 
History reviewed. No pertinent family history. OBJECTIVE: 
 
Visit Vitals /89 Pulse 89 Temp 97.8 °F (36.6 °C) (Oral) Resp 20 Ht 5' 7\" (1.702 m) Wt 273 lb 11.2 oz (124.1 kg) SpO2 97% BMI 42.87 kg/m² CONSTITUTIONAL: well , well nourished, appears age appropriate EYES: perrla, eom intact ENMT:moist mucous membranes, pharynx clear NECK: supple. Thyroid normal 
RESPIRATORY: Chest: clear bilaterally CARDIOVASCULAR: Heart: regular rate and rhythm GASTROINTESTINAL: Abdomen: soft, bowel sounds active HEMATOLOGIC: no pathological lymph nodes palpated MUSCULOSKELETAL: Extremities: no edema, pulse 1+ INTEGUMENT: No unusual rashes or suspicious skin lesions noted. Nails appear normal. 
NEUROLOGIC: non-focal exam  
MENTAL STATUS: alert and oriented, appropriate affect ASSESSMENT: 
1. Medicare annual wellness visit, subsequent 2. Screening for alcoholism 3. Screening for depression 4. Type 2 diabetes mellitus without complication, without long-term current use of insulin ([address] 5. Grade II diastolic dysfunction 6. Obesity, morbid ([address] 7. Pulmonary hypertension, moderate to severe ([address] 8. Morbid obesity with BMI of 45.0-49.9, adult ([address] 9. Mild mitral insufficiency 10. SOB (shortness of breath) Blood pressure control is lacking related to compliance issue. Will ask him to reinstitute both medications, asked him to come by in about a month or so to confirm blood pressure and make sure it is normotensive. BMI remains above ideal body weight. Encouraged physical activity 30 minutes five days a week and a heart healthy, weight reducing diet. Dr. Cira Vicente is following him for his pulmonary hypertension and grade 2 diastolic dysfunction. We encouraged compliance. I have discussed the diagnosis with the patient and the intended plan as seen in the 
orders above. The patient understands and agees with the plan. The patient has  
received an after visit summary and questions were answered concerning 
future plans Patient labs and/or xrays were reviewed Past records were reviewed. PLAN: 
. Orders Placed This Encounter  Depression Screen Annual  
 RENAL FUNCTION PANEL  
 HEMOGLOBIN A1C WITH EAG  
 BNP  minoxidil (LONITEN) 2.5 mg tablet  amLODIPine (NORVASC) 10 mg tablet Follow-up and Dispositions · Return in about 1 month (around 4/24/2019) for bp check. ATTENTION:  
This medical record was transcribed using an electronic medical records system. Although proofread, it may and can contain electronic and spelling errors. Other human spelling and other errors may be present. Corrections may be executed at a later time. Please feel free to contact us for any clarifications as needed.

## 2019-03-29 LAB
ALBUMIN SERPL-MCNC: 4.7 G/DL (ref 3.5–5.5)
BUN SERPL-MCNC: 27 MG/DL (ref 6–20)
BUN/CREAT SERPL: 17 (ref 9–20)
CALCIUM SERPL-MCNC: 9.8 MG/DL (ref 8.7–10.2)
CHLORIDE SERPL-SCNC: 101 MMOL/L (ref 96–106)
CO2 SERPL-SCNC: 23 MMOL/L (ref 20–29)
CREAT SERPL-MCNC: 1.63 MG/DL (ref 0.76–1.27)
EST. AVERAGE GLUCOSE BLD GHB EST-MCNC: 148 MG/DL
GLUCOSE SERPL-MCNC: 108 MG/DL (ref 65–99)
HBA1C MFR BLD: 6.8 % (ref 4.8–5.6)
PHOSPHATE SERPL-MCNC: 4.5 MG/DL (ref 2.5–4.5)
POTASSIUM SERPL-SCNC: 4.6 MMOL/L (ref 3.5–5.2)
SODIUM SERPL-SCNC: 140 MMOL/L (ref 134–144)

## 2019-04-03 LAB — BNP SERPL-MCNC: <2.5 PG/ML (ref 0–100)

## 2019-04-24 ENCOUNTER — CLINICAL SUPPORT (OUTPATIENT)
Dept: INTERNAL MEDICINE CLINIC | Age: 34
End: 2019-04-24

## 2019-04-24 DIAGNOSIS — I10 HTN (HYPERTENSION), MALIGNANT: Primary | ICD-10-CM

## 2019-04-25 VITALS
BODY MASS INDEX: 44.09 KG/M2 | SYSTOLIC BLOOD PRESSURE: 172 MMHG | OXYGEN SATURATION: 97 % | HEART RATE: 99 BPM | DIASTOLIC BLOOD PRESSURE: 90 MMHG | WEIGHT: 281.5 LBS

## 2019-04-25 NOTE — PROGRESS NOTES
Patient return to the office to today for BP check. Chief Complaint   Patient presents with    Blood Pressure Check     Visit Vitals  /90   Pulse 99   Wt 281 lb 8 oz (127.7 kg)   SpO2 97%   BMI 44.09 kg/m²     Patient states he's taking Amlodipine 10mg 1 tab daily, Metoprolol Succinate 100mg 1 tab every night, Clonidine 0.3mg 1 tab every night, Losartan 100mg 1 tab daily, Lasix 40mg 1 tab daily, Minoxidil 2.5mg 2 tabs daily. Per Dr. Shahram Ponce patient instructed to increased Minoxidil 2.5mg 3 tabs daily and return to the office in 2 weeks for BP check. BMP and Aldosterone Renin Ratio also drawn today.

## 2019-05-04 LAB
ALDOST SERPL-MCNC: NORMAL NG/DL
BUN SERPL-MCNC: 21 MG/DL (ref 6–20)
BUN/CREAT SERPL: 15 (ref 9–20)
CALCIUM SERPL-MCNC: 9.9 MG/DL (ref 8.7–10.2)
CHLORIDE SERPL-SCNC: 103 MMOL/L (ref 96–106)
CO2 SERPL-SCNC: 22 MMOL/L (ref 20–29)
CREAT SERPL-MCNC: 1.36 MG/DL (ref 0.76–1.27)
GLUCOSE SERPL-MCNC: 115 MG/DL (ref 65–99)
POTASSIUM SERPL-SCNC: 5 MMOL/L (ref 3.5–5.2)
RENIN PLAS-CCNC: NORMAL NG/ML/H
SODIUM SERPL-SCNC: 141 MMOL/L (ref 134–144)

## 2019-05-08 ENCOUNTER — CLINICAL SUPPORT (OUTPATIENT)
Dept: INTERNAL MEDICINE CLINIC | Age: 34
End: 2019-05-08

## 2019-05-08 VITALS
HEART RATE: 88 BPM | WEIGHT: 278.5 LBS | DIASTOLIC BLOOD PRESSURE: 93 MMHG | BODY MASS INDEX: 43.62 KG/M2 | SYSTOLIC BLOOD PRESSURE: 156 MMHG

## 2019-05-08 DIAGNOSIS — I10 HTN (HYPERTENSION), MALIGNANT: Primary | ICD-10-CM

## 2019-05-08 RX ORDER — MINOXIDIL 2.5 MG/1
5 TABLET ORAL 2 TIMES DAILY
Qty: 120 TAB | Refills: 4 | Status: SHIPPED | OUTPATIENT
Start: 2019-05-08 | End: 2019-06-26 | Stop reason: SDUPTHER

## 2019-05-09 NOTE — PROGRESS NOTES
Michael Venturao  in for BP check.  He  states that he  takes amlodipine 10 mg 1 tab PO daily, lasix 40 mg 1 tab PO daily, losartan 100 mg 1 tab PO daily,minoxidil 2.5 mg 1 tab PO TID and metoprolol succ  mg 1 tab q night  BP (!) 156/93   Pulse 88   Wt 278 lb 8 oz (126.3 kg)   BMI 43.62 kg/m²   Patient instructed to start take minoxidil 5 mg 1 tab PO BID continue taking BP medication as prescribed  RTO in 1 month for BP check per Dr. Javier Bell, [facility]

## 2019-05-12 LAB
ALBUMIN SERPL-MCNC: 4.9 G/DL (ref 3.5–5.5)
ALDOST SERPL-MCNC: 4.9 NG/DL (ref 0–30)
BUN SERPL-MCNC: 28 MG/DL (ref 6–20)
BUN/CREAT SERPL: 17 (ref 9–20)
CALCIUM SERPL-MCNC: 10.2 MG/DL (ref 8.7–10.2)
CHLORIDE SERPL-SCNC: 100 MMOL/L (ref 96–106)
CO2 SERPL-SCNC: 22 MMOL/L (ref 20–29)
CREAT SERPL-MCNC: 1.68 MG/DL (ref 0.76–1.27)
GLUCOSE SERPL-MCNC: 112 MG/DL (ref 65–99)
PHOSPHATE SERPL-MCNC: 4.4 MG/DL (ref 2.5–4.5)
POTASSIUM SERPL-SCNC: 4.5 MMOL/L (ref 3.5–5.2)
SODIUM SERPL-SCNC: 138 MMOL/L (ref 134–144)

## 2019-05-14 LAB
ALDOST SERPL-MCNC: 5.4 NG/DL (ref 0–30)
ALDOST/RENIN PLAS-RTO: 2.5 {RATIO} (ref 0–30)
RENIN PLAS-CCNC: 2.16 NG/ML/HR (ref 0.17–5.38)

## 2019-05-17 RX ORDER — METFORMIN HYDROCHLORIDE 500 MG/1
500 TABLET, EXTENDED RELEASE ORAL
Qty: 30 TAB | Refills: 11 | Status: SHIPPED | OUTPATIENT
Start: 2019-05-17 | End: 2020-01-30

## 2019-06-26 ENCOUNTER — OFFICE VISIT (OUTPATIENT)
Dept: INTERNAL MEDICINE CLINIC | Age: 34
End: 2019-06-26

## 2019-06-26 VITALS
BODY MASS INDEX: 45.19 KG/M2 | TEMPERATURE: 98.2 F | RESPIRATION RATE: 18 BRPM | SYSTOLIC BLOOD PRESSURE: 175 MMHG | DIASTOLIC BLOOD PRESSURE: 106 MMHG | HEIGHT: 67 IN | OXYGEN SATURATION: 98 % | WEIGHT: 287.9 LBS | HEART RATE: 77 BPM

## 2019-06-26 DIAGNOSIS — G47.33 OSA (OBSTRUCTIVE SLEEP APNEA): ICD-10-CM

## 2019-06-26 DIAGNOSIS — E66.01 MORBID OBESITY WITH BMI OF 45.0-49.9, ADULT (HCC): ICD-10-CM

## 2019-06-26 DIAGNOSIS — I27.20 PULMONARY HYPERTENSION, MODERATE TO SEVERE (HCC): ICD-10-CM

## 2019-06-26 DIAGNOSIS — I34.0 MILD MITRAL INSUFFICIENCY: ICD-10-CM

## 2019-06-26 DIAGNOSIS — I10 HTN (HYPERTENSION), MALIGNANT: Primary | ICD-10-CM

## 2019-06-26 DIAGNOSIS — I51.89 GRADE II DIASTOLIC DYSFUNCTION: ICD-10-CM

## 2019-06-26 RX ORDER — MINOXIDIL 2.5 MG/1
5 TABLET ORAL 2 TIMES DAILY
Qty: 120 TAB | Refills: 4 | Status: SHIPPED | OUTPATIENT
Start: 2019-06-26 | End: 2020-01-29 | Stop reason: SDUPTHER

## 2019-06-26 NOTE — PROGRESS NOTES
1. Have you been to the ER, urgent care clinic since your last visit? Hospitalized since your last visit? No    2. Have you seen or consulted any other health care providers outside of the [address] [place] [address] since your last visit? Include any pap smears or colon screening.  No

## 2019-06-26 NOTE — PROGRESS NOTES
SPORTS MEDICINE AND PRIMARY CARE  Loc Bishop MD, [address] [address]  Phone:  [phone]  Fax: [phone]      Chief Complaint   Patient presents with    Hypertension     f/u         SUBECTIVE:    Bebe Alfredo is a 34 y.o. male Patient returns today with known history of malignant hypertension, mitral insufficiency, grade 2 diastolic dysfunction, morbid obesity, pulmonary hypertension, and is seen for evaluation. Dr. Sumi Delgado has had a cardiac stress test, which was negative. He has been seen by [facility] for pulmonary edema with CHF with preserved LV function, snoring, is referred for sleep study and for follow up with Dr. Aidan Tovar. Patient comes in for evaluation. Patient returns today for follow up. He is completely asymptomatic, no SOB, no ankle swelling, and is seen for evaluation. Current Outpatient Medications   Medication Sig Dispense Refill    minoxidil (LONITEN) 2.5 mg tablet Take 2 Tabs by mouth two (2) times a day. 120 Tab 4    metFORMIN ER (GLUCOPHAGE XR) 500 mg tablet Take 1 Tab by mouth daily (with dinner). 30 Tab 11    amLODIPine (NORVASC) 10 mg tablet Take 1 Tab by mouth daily. 30 Tab 11    furosemide (LASIX) 40 mg tablet Take 1 Tab by mouth daily. 30 Tab 3    metoprolol succinate (TOPROL-XL) 100 mg tablet Take 1 Tab by mouth nightly. 30 Tab 11    cloNIDine HCl (CATAPRES) 0.3 mg tablet Take 1 Tab by mouth nightly. 30 Tab 11    losartan (COZAAR) 100 mg tablet Take 1 Tab by mouth daily. 30 Tab 11    ferrous gluconate 324 mg (37.5 mg iron) tablet Take 1 Tab by mouth every other day.  27 Tab 11     Past Medical History:   Diagnosis Date    Blurred vision, bilateral     DM2 (diabetes mellitus, type 2) ([facility] 04/03/2018    Grade II diastolic dysfunction 04/55/0194    HTN (hypertension), malignant     Morbid obesity with BMI of 45.0-49.9, adult ([facility]     Non-compliance 12/10/2018    Pulmonary hypertension, moderate to severe ([facility] 12/10/2018    chip     History reviewed. No pertinent surgical history. Not on File    REVIEW OF SYSTEMS:   There has been no chest pain, no SOB. Social History     Socioeconomic History    Marital status: SINGLE     Spouse name: Not on file    Number of children: Not on file    Years of education: Not on file    Highest education level: Not on file   Tobacco Use    Smoking status: Former Smoker     Last attempt to quit: 2018     Years since quittin.5    Smokeless tobacco: Never Used   Substance and Sexual Activity    Alcohol use: No    Drug use: No    Sexual activity: Not Currently   Social History Narrative    Medical History: KeloidObesity    Surgical History: Denies Past Surgical History    Hospitalization/Major Diagnostic Procedure: Denies Past Hospitalization    Family History: Mother: alive 37 yrs a/w Father: alive 36 yrs a/w Sister(s): alive Brother(s): alive Grandmother: alive 64 yrs Lilibeth Salmon 3 brother(s) , 2 sister(s) . Social History: Alcohol Use Patient does not use alcohol. Smoking Status Patient is a current every day smoker, Received cessation    counseling on: 2013. Marital Status: Single. Lives w ith: grandmother. Occupation/W ork: employed full time [employer] . Education/School: has highschool diploma. Jain: no.   r  History reviewed. No pertinent family history. OBJECTIVE:  Visit Vitals  BP (!) 175/106   Pulse 77   Temp 98.2 °F (36.8 °C) (Oral)   Resp 18   Ht 5' 7\" (1.702 m)   Wt 287 lb 14.4 oz (130.6 kg)   SpO2 98%   BMI 45.09 kg/m²     ENT: perrla,  eom intact  NECK: supple. Thyroid normal  CHEST: clear to ascultation and percussion   HEART: regular rate and rhythm  ABD: soft, bowel sounds active  EXTREMITIES: no edema, pulse 1+     Clinical Support on 2019   Component Date Value Ref Range Status    Aldosterone 2019 4.9  0.0 - 30.0 ng/dL Final    Comment:  This test was developed and its performance characteristics  determined by [facility]. It has not been cleared or approved  by the Food and Drug Administration.  Aldosterone 05/08/2019 5.4  0.0 - 30.0 ng/dL Final    Comment: This test was developed and its performance characteristics  determined by [facility]. It has not been cleared or approved  by the Food and Drug Administration.  Renin Activity 05/08/2019 2.157  0.167 - 5.380 ng/mL/hr Final    Comment: This test was developed and its performance characteristics  determined by [facility]. It has not been cleared or approved  by the Food and Drug Administration.       Aldos/Renin Ratio 05/08/2019 2.5  0.0 - 30.0 Final                             Units:      ng/dL per ng/mL/hr    Glucose 05/08/2019 112* 65 - 99 mg/dL Final    BUN 05/08/2019 28* 6 - 20 mg/dL Final    Creatinine 05/08/2019 1.68* 0.76 - 1.27 mg/dL Final    GFR est non-AA 05/08/2019 53* >59 mL/min/1.73 Final    GFR est AA 05/08/2019 62  >59 mL/min/1.73 Final    BUN/Creatinine ratio 05/08/2019 17  9 - 20 Final    Sodium 05/08/2019 138  134 - 144 mmol/L Final    Potassium 05/08/2019 4.5  3.5 - 5.2 mmol/L Final    Chloride 05/08/2019 100  96 - 106 mmol/L Final    CO2 05/08/2019 22  20 - 29 mmol/L Final    Calcium 05/08/2019 10.2  8.7 - 10.2 mg/dL Final    Phosphorus 05/08/2019 4.4  2.5 - 4.5 mg/dL Final    Albumin 05/08/2019 4.9  3.5 - 5.5 g/dL Final   Clinical Support on 04/24/2019   Component Date Value Ref Range Status    Glucose 05/03/2019 115* 65 - 99 mg/dL Final    BUN 05/03/2019 21* 6 - 20 mg/dL Final    Creatinine 05/03/2019 1.36* 0.76 - 1.27 mg/dL Final    GFR est non-AA 05/03/2019 69  >59 mL/min/1.73 Final    GFR est AA 05/03/2019 80  >59 mL/min/1.73 Final    BUN/Creatinine ratio 05/03/2019 15  9 - 20 Final    Sodium 05/03/2019 141  134 - 144 mmol/L Final    Potassium 05/03/2019 5.0  3.5 - 5.2 mmol/L Final    Chloride 05/03/2019 103  96 - 106 mmol/L Final    CO2 05/03/2019 22  20 - 29 mmol/L Final    Calcium 05/03/2019 9.9  8.7 - 10.2 mg/dL Final    Aldosterone 05/03/2019 CANCELED  ng/dL Final-Edited    Comment: No frozen plasma received. This test was developed and its performance characteristics  determined by [facility]. It has not been cleared or approved  by the Food and Drug Administration. Result canceled by the ancillary.  Renin Activity 05/03/2019 CANCELED   Final-Edited    Comment: Test not performed    Result canceled by the ancillary. Office Visit on 03/27/2019   Component Date Value Ref Range Status    Glucose 03/27/2019 108* 65 - 99 mg/dL Final    BUN 03/27/2019 27* 6 - 20 mg/dL Final    Creatinine 03/27/2019 1.63* 0.76 - 1.27 mg/dL Final    GFR est non-AA 03/27/2019 55* >59 mL/min/1.73 Final    GFR est AA 03/27/2019 64  >59 mL/min/1.73 Final    BUN/Creatinine ratio 03/27/2019 17  9 - 20 Final    Sodium 03/27/2019 140  134 - 144 mmol/L Final    Potassium 03/27/2019 4.6  3.5 - 5.2 mmol/L Final    Chloride 03/27/2019 101  96 - 106 mmol/L Final    CO2 03/27/2019 23  20 - 29 mmol/L Final    Calcium 03/27/2019 9.8  8.7 - 10.2 mg/dL Final    Phosphorus 03/27/2019 4.5  2.5 - 4.5 mg/dL Final    Albumin 03/27/2019 4.7  3.5 - 5.5 g/dL Final    Hemoglobin A1c 03/27/2019 6.8* 4.8 - 5.6 % Final    Comment:          Prediabetes: 5.7 - 6.4           Diabetes: >6.4           Glycemic control for adults with diabetes: <7.0      Estimated average glucose 03/27/2019 148  mg/dL Final    B-type Natriuretic Peptide 03/27/2019 <2.5  0.0 - 100.0 pg/mL Final          ASSESSMENT:  1. HTN (hypertension), malignant    2. Mild mitral insufficiency    3. Grade II diastolic dysfunction    4. Morbid obesity with BMI of 45.0-49.9, adult ([facility]    5. Pulmonary hypertension, moderate to severe ([facility]    6. KRISTY (obstructive sleep apnea)      BP control is lacking. We checked for primary aldosteronism and found that was not the case.   The other disease process that has not been excluded is KRISTY, which could be a contributing factor to the difficulty in controlling his BP. He remains without SOB, without ankle swelling. Will increase the Minoxidil to 10 mg b.i.d., given precautions that if he develops SOB or any ankle swelling, he is to call us immediately and at that point we will increase his Furosemide. We remain very concerned about kidney function. His GFR remains normal at 62, but his creatinine is up to 1.68. We will confirm that today. He will be back in one week for BP check. I have discussed the diagnosis with the patient and the intended plan as seen in the  orders above. The patient understands and agees with the plan. The patient has   received an after visit summary and questions were answered concerning  future plans  Patient labs and/or xrays were reviewed  Past records were reviewed. PLAN:  .  Orders Placed This Encounter    RENAL FUNCTION PANEL    SLEEP MEDICINE REFERRAL    minoxidil (LONITEN) 2.5 mg tablet                     ATTENTION:   This medical record was transcribed using an electronic medical records system. Although proofread, it may and can contain electronic and spelling errors. Other human spelling and other errors may be present. Corrections may be executed at a later time. Please feel free to contact us for any clarifications as needed.

## 2019-07-03 ENCOUNTER — CLINICAL SUPPORT (OUTPATIENT)
Dept: INTERNAL MEDICINE CLINIC | Age: 34
End: 2019-07-03

## 2019-07-03 VITALS
WEIGHT: 290 LBS | BODY MASS INDEX: 45.42 KG/M2 | SYSTOLIC BLOOD PRESSURE: 149 MMHG | HEART RATE: 89 BPM | DIASTOLIC BLOOD PRESSURE: 89 MMHG

## 2019-07-03 DIAGNOSIS — I10 HTN (HYPERTENSION), MALIGNANT: Primary | ICD-10-CM

## 2019-07-03 NOTE — PROGRESS NOTES
Pt came in today for a bp check. Pt states he is taking  Minoxidil 2.5 mg 2 tabs PO 2x daily  Metoprolol succinate 100 mg 1 tab PO @night  Losartan 100 mg 1 tab PO daily  Clonidine 0.3 mg 1 tab PO @night   Amlodipine 10 mg 1 tab PO daily  Weight-290lb  Pulse-89  BP-149/89  Per Dr. Georgiana Rees pt is to continue taking bp meds as directed above with no changes. Pt is to return in 1 month for a bp check.     Renzo Garland

## 2020-01-29 ENCOUNTER — OFFICE VISIT (OUTPATIENT)
Dept: INTERNAL MEDICINE CLINIC | Age: 35
End: 2020-01-29

## 2020-01-29 VITALS
HEIGHT: 67 IN | DIASTOLIC BLOOD PRESSURE: 102 MMHG | WEIGHT: 292 LBS | TEMPERATURE: 98.5 F | BODY MASS INDEX: 45.83 KG/M2 | SYSTOLIC BLOOD PRESSURE: 170 MMHG | RESPIRATION RATE: 20 BRPM | OXYGEN SATURATION: 97 % | HEART RATE: 91 BPM

## 2020-01-29 DIAGNOSIS — I10 HTN (HYPERTENSION), MALIGNANT: ICD-10-CM

## 2020-01-29 DIAGNOSIS — I27.20 PULMONARY HYPERTENSION, MODERATE TO SEVERE (HCC): ICD-10-CM

## 2020-01-29 DIAGNOSIS — E11.9 TYPE 2 DIABETES MELLITUS WITHOUT COMPLICATION, WITHOUT LONG-TERM CURRENT USE OF INSULIN (HCC): Primary | ICD-10-CM

## 2020-01-29 DIAGNOSIS — I51.89 GRADE II DIASTOLIC DYSFUNCTION: ICD-10-CM

## 2020-01-29 DIAGNOSIS — I34.0 MILD MITRAL INSUFFICIENCY: ICD-10-CM

## 2020-01-29 DIAGNOSIS — E66.01 OBESITY, MORBID (HCC): ICD-10-CM

## 2020-01-29 RX ORDER — CLONIDINE HYDROCHLORIDE 0.3 MG/1
0.3 TABLET ORAL
Qty: 30 TAB | Refills: 11 | Status: SHIPPED | OUTPATIENT
Start: 2020-01-29 | End: 2020-03-31 | Stop reason: SDUPTHER

## 2020-01-29 RX ORDER — MINOXIDIL 2.5 MG/1
7.5 TABLET ORAL 2 TIMES DAILY
Qty: 180 TAB | Refills: 3 | Status: SHIPPED | OUTPATIENT
Start: 2020-01-29 | End: 2020-02-26 | Stop reason: SDUPTHER

## 2020-01-29 NOTE — ASSESSMENT & PLAN NOTE
Key CAD CHF Meds             cloNIDine HCL (CATAPRES) 0.3 mg tablet (Taking) Take 1 Tab by mouth nightly. minoxidil (LONITEN) 2.5 mg tablet (Taking) Take 3 Tabs by mouth two (2) times a day. amLODIPine (NORVASC) 10 mg tablet (Taking) Take 1 Tab by mouth daily. furosemide (LASIX) 40 mg tablet (Taking) Take 1 Tab by mouth daily. metoprolol succinate (TOPROL-XL) 100 mg tablet (Taking) Take 1 Tab by mouth nightly. losartan (COZAAR) 100 mg tablet (Taking) Take 1 Tab by mouth daily.         Lab Results   Component Value Date/Time    B-type Natriuretic Peptide <2.5 03/27/2019 05:33 PM    Sodium 138 05/08/2019 05:19 PM    Potassium 4.5 05/08/2019 05:19 PM    Cholesterol, total 171 12/20/2018 01:22 PM    HDL Cholesterol 45 12/20/2018 01:22 PM    LDL, calculated 111 12/20/2018 01:22 PM    Triglyceride 76 12/20/2018 01:22 PM

## 2020-01-29 NOTE — PROGRESS NOTES
1. Have you been to the ER, urgent care clinic since your last visit? Hospitalized since your last visit? No    2. Have you seen or consulted any other health care providers outside of the [address] [place] [address] since your last visit? Include any pap smears or colon screening.  No     Wants to discuss medication

## 2020-01-29 NOTE — ASSESSMENT & PLAN NOTE
Stable, based on history, physical exam and review of pertinent labs, studies and medications; meds reconciled; continue current treatment plan. Key Obesity Meds             metFORMIN ER (GLUCOPHAGE XR) 500 mg tablet (Taking) Take 1 Tab by mouth daily (with dinner). furosemide (LASIX) 40 mg tablet (Taking) Take 1 Tab by mouth daily.         Lab Results   Component Value Date/Time    Hemoglobin A1c 6.8 03/27/2019 05:32 PM    Glucose 112 05/08/2019 05:19 PM    Cholesterol, total 171 12/20/2018 01:22 PM    HDL Cholesterol 45 12/20/2018 01:22 PM    LDL, calculated 111 12/20/2018 01:22 PM    Triglyceride 76 12/20/2018 01:22 PM    Sodium 138 05/08/2019 05:19 PM    Potassium 4.5 05/08/2019 05:19 PM    ALT (SGPT) 29 12/20/2018 01:22 PM    AST (SGOT) 12 12/20/2018 01:22 PM

## 2020-01-29 NOTE — ASSESSMENT & PLAN NOTE
Stable, based on history, physical exam and review of pertinent labs, studies and medications; meds reconciled; continue current treatment plan. Key Antihyperglycemic Medications             metFORMIN ER (GLUCOPHAGE XR) 500 mg tablet (Taking) Take 1 Tab by mouth daily (with dinner). Other Key Diabetic Medications             losartan (COZAAR) 100 mg tablet (Taking) Take 1 Tab by mouth daily.         Lab Results   Component Value Date/Time    Hemoglobin A1c 6.8 03/27/2019 05:32 PM    Glucose 112 05/08/2019 05:19 PM    Creatinine 1.68 05/08/2019 05:19 PM    Microalb/Creat ratio (ug/mg creat.) 21.7 12/20/2018 01:22 PM    Cholesterol, total 171 12/20/2018 01:22 PM    HDL Cholesterol 45 12/20/2018 01:22 PM    LDL, calculated 111 12/20/2018 01:22 PM    Triglyceride 76 12/20/2018 01:22 PM     Diabetic Foot and Eye Exam HM Status   Topic Date Due    Eye Exam  11/11/1997    Diabetic Foot Care  12/20/2019

## 2020-01-29 NOTE — PROGRESS NOTES
SPORTS MEDICINE AND PRIMARY CARE  Jewels Manley MD, [address] [address]  Phone:  [phone]  Fax: [phone]       Chief Complaint   Patient presents with    Medication Evaluation   . SUBJECTIVE:    Sue Le is a 30 y.o. male Patient returns today with known history of hypertension, pulmonary hypertension, moderately severe, morbid obesity, mitral insufficiency, diastolic dysfunction, type 2 diabetes, and is seen for evaluation. He is out of his Clonidine. He denies new complaints. Current Outpatient Medications   Medication Sig Dispense Refill    cloNIDine HCL (CATAPRES) 0.3 mg tablet Take 1 Tab by mouth nightly. 30 Tab 11    minoxidil (LONITEN) 2.5 mg tablet Take 3 Tabs by mouth two (2) times a day. 180 Tab 3    metFORMIN ER (GLUCOPHAGE XR) 500 mg tablet Take 1 Tab by mouth daily (with dinner). 30 Tab 11    amLODIPine (NORVASC) 10 mg tablet Take 1 Tab by mouth daily. 30 Tab 11    furosemide (LASIX) 40 mg tablet Take 1 Tab by mouth daily. 30 Tab 3    metoprolol succinate (TOPROL-XL) 100 mg tablet Take 1 Tab by mouth nightly. 30 Tab 11    losartan (COZAAR) 100 mg tablet Take 1 Tab by mouth daily. 30 Tab 11    ferrous gluconate 324 mg (37.5 mg iron) tablet Take 1 Tab by mouth every other day. 27 Tab 11     Past Medical History:   Diagnosis Date    Blurred vision, bilateral     DM2 (diabetes mellitus, type 2) ([facility] 04/03/2018    Grade II diastolic dysfunction 92/71/4567    HTN (hypertension), malignant     Morbid obesity with BMI of 45.0-49.9, adult ([facility]     Non-compliance 12/10/2018    Pulmonary hypertension, moderate to severe ([facility] 12/10/2018    chip     History reviewed. No pertinent surgical history.   Not on File      REVIEW OF SYSTEMS:  General: negative for - chills or fever  ENT: negative for - headaches, nasal congestion or tinnitus  Respiratory: negative for - cough, hemoptysis, shortness of breath or wheezing  Cardiovascular : negative for - chest pain, edema, palpitations or shortness of breath  Gastrointestinal: negative for - abdominal pain, blood in stools, heartburn or nausea/vomiting  Genito-Urinary: no dysuria, trouble voiding, or hematuria  Musculoskeletal: negative for - gait disturbance, joint pain, joint stiffness or joint swelling  Neurological: no TIA or stroke symptoms  Hematologic: no bruises, no bleeding, no swollen glands  Integument: no lumps, mole changes, nail changes or rash  Endocrine: no malaise/lethargy or unexpected weight changes      Social History     Socioeconomic History    Marital status: SINGLE     Spouse name: Not on file    Number of children: Not on file    Years of education: Not on file    Highest education level: Not on file   Tobacco Use    Smoking status: Former Smoker     Last attempt to quit: 2018     Years since quittin.1    Smokeless tobacco: Never Used   Substance and Sexual Activity    Alcohol use: No    Drug use: No    Sexual activity: Not Currently   Social History Narrative    Medical History: KeloidObesity    Surgical History: Denies Past Surgical History    Hospitalization/Major Diagnostic Procedure: Denies Past Hospitalization    Family History: Mother: alive 37 yrs a/w Father: alive 36 yrs a/w Sister(s): alive Brother(s): alive Grandmother: alive 64 yrs Neema Khanna 3 brother(s) , 2 sister(s) . Social History: Alcohol Use Patient does not use alcohol. Smoking Status Patient is a current every day smoker, Received cessation    counseling on: 2013. Marital Status: Single. Lives w ith: grandmother. Occupation/W ork: employed full time [employer] . Education/School: has highschool diploma. Jew: no.     History reviewed. No pertinent family history.     OBJECTIVE:    Visit Vitals  BP (!) 170/102   Pulse 91   Temp 98.5 °F (36.9 °C) (Oral)   Resp 20   Ht 5' 7\" (1.702 m)   Wt 292 lb (132.5 kg)   SpO2 97%   BMI 45.73 kg/m²     CONSTITUTIONAL: well , well nourished, appears age appropriate  EYES: perrla, eom intact  ENMT:moist mucous membranes, pharynx clear  NECK: supple. Thyroid normal  RESPIRATORY: Chest: clear bilaterally   CARDIOVASCULAR: Heart: regular rate and rhythm  GASTROINTESTINAL: Abdomen: soft, bowel sounds active  HEMATOLOGIC: no pathological lymph nodes palpated  MUSCULOSKELETAL: Extremities: no edema, pulse 1+   INTEGUMENT: No unusual rashes or suspicious skin lesions noted. Nails appear normal.  NEUROLOGIC: non-focal exam   MENTAL STATUS: alert and oriented, appropriate affect           ASSESSMENT:  1. Type 2 diabetes mellitus without complication, without long-term current use of insulin ([address] [facility]    2. HTN (hypertension), malignant    3. Pulmonary hypertension, moderate to severe (HCC)    4. Obesity, morbid ([address] [facility]    5. Mild mitral insufficiency    6. Grade II diastolic dysfunction      Will assess blood sugar control with hemoglobin A1c and report to him the result. Blood pressure remains elevated. He states it is usually in the 150s when he checks it outside the office. We have not seen him for six months. He missed his last appointment. He has moderate to severe pulmonary hypertension and has been seen by cardiology in the past.    Morbid obesity remains a major concern and we encouraged physical activity 30 minutes five days a week and a heart healthy, weight reducing diet. He has a murmur of mitral insufficiency, although it is very mild. History of grade 2 diastolic dysfunction with no symptoms to suggest cardiac decompensation. We are increasing his Minoxidil to 7.5 mg b.i.d. When he comes back in one month for a blood pressure check if his blood pressure remains elevated we will change it to 10 mg tablet twice a day so he is not taking so many pills. He is agreeable to the plan. I have discussed the diagnosis with the patient and the intended plan as seen in the  orders above.   The patient understands and agees with the plan. The patient has   received an after visit summary and questions were answered concerning  future plans  Patient labs and/or xrays were reviewed  Past records were reviewed. PLAN:  .  Orders Placed This Encounter    MICROALBUMIN, UR, RAND W/ MICROALB/CREAT RATIO    HEMOGLOBIN A1C WITH EAG    LIPID PANEL    URINALYSIS W/ RFLX MICROSCOPIC    CBC WITH AUTOMATED DIFF    METABOLIC PANEL, COMPREHENSIVE    TSH 3RD GENERATION    REFERRAL TO OPHTHALMOLOGY    cloNIDine HCL (CATAPRES) 0.3 mg tablet    minoxidil (LONITEN) 2.5 mg tablet       Follow-up and Dispositions    · Return in about 4 weeks (around 2/26/2020) for bp check. ATTENTION:   This medical record was transcribed using an electronic medical records system. Although proofread, it may and can contain electronic and spelling errors. Other human spelling and other errors may be present. Corrections may be executed at a later time. Please feel free to contact us for any clarifications as needed.

## 2020-01-30 LAB
ALBUMIN SERPL-MCNC: 4.7 G/DL (ref 4–5)
ALBUMIN/CREAT UR: 15 MG/G CREAT (ref 0–29)
ALBUMIN/GLOB SERPL: 1.4 {RATIO} (ref 1.2–2.2)
ALP SERPL-CCNC: 95 IU/L (ref 39–117)
ALT SERPL-CCNC: 23 IU/L (ref 0–44)
APPEARANCE UR: CLEAR
AST SERPL-CCNC: 15 IU/L (ref 0–40)
BASOPHILS # BLD AUTO: 0 X10E3/UL (ref 0–0.2)
BASOPHILS NFR BLD AUTO: 0 %
BILIRUB SERPL-MCNC: 0.2 MG/DL (ref 0–1.2)
BILIRUB UR QL STRIP: NEGATIVE
BUN SERPL-MCNC: 13 MG/DL (ref 6–20)
BUN/CREAT SERPL: 9 (ref 9–20)
CALCIUM SERPL-MCNC: 9.7 MG/DL (ref 8.7–10.2)
CHLORIDE SERPL-SCNC: 101 MMOL/L (ref 96–106)
CHOLEST SERPL-MCNC: 207 MG/DL (ref 100–199)
CO2 SERPL-SCNC: 21 MMOL/L (ref 20–29)
COLOR UR: YELLOW
CREAT SERPL-MCNC: 1.45 MG/DL (ref 0.76–1.27)
CREAT UR-MCNC: 139.2 MG/DL
EOSINOPHIL # BLD AUTO: 0.1 X10E3/UL (ref 0–0.4)
EOSINOPHIL NFR BLD AUTO: 1 %
ERYTHROCYTE [DISTWIDTH] IN BLOOD BY AUTOMATED COUNT: 13.3 % (ref 11.6–15.4)
EST. AVERAGE GLUCOSE BLD GHB EST-MCNC: 183 MG/DL
GLOBULIN SER CALC-MCNC: 3.4 G/DL (ref 1.5–4.5)
GLUCOSE SERPL-MCNC: 124 MG/DL (ref 65–99)
GLUCOSE UR QL: NEGATIVE
HBA1C MFR BLD: 8 % (ref 4.8–5.6)
HCT VFR BLD AUTO: 38.3 % (ref 37.5–51)
HDLC SERPL-MCNC: 60 MG/DL
HGB BLD-MCNC: 13.2 G/DL (ref 13–17.7)
HGB UR QL STRIP: NEGATIVE
IMM GRANULOCYTES # BLD AUTO: 0 X10E3/UL (ref 0–0.1)
IMM GRANULOCYTES NFR BLD AUTO: 0 %
KETONES UR QL STRIP: NEGATIVE
LDLC SERPL CALC-MCNC: 125 MG/DL (ref 0–99)
LEUKOCYTE ESTERASE UR QL STRIP: NEGATIVE
LYMPHOCYTES # BLD AUTO: 2.7 X10E3/UL (ref 0.7–3.1)
LYMPHOCYTES NFR BLD AUTO: 30 %
MCH RBC QN AUTO: 28.2 PG (ref 26.6–33)
MCHC RBC AUTO-ENTMCNC: 34.5 G/DL (ref 31.5–35.7)
MCV RBC AUTO: 82 FL (ref 79–97)
MICRO URNS: NORMAL
MICROALBUMIN UR-MCNC: 20.3 UG/ML
MONOCYTES # BLD AUTO: 0.8 X10E3/UL (ref 0.1–0.9)
MONOCYTES NFR BLD AUTO: 9 %
NEUTROPHILS # BLD AUTO: 5.4 X10E3/UL (ref 1.4–7)
NEUTROPHILS NFR BLD AUTO: 60 %
NITRITE UR QL STRIP: NEGATIVE
PH UR STRIP: 5 [PH] (ref 5–7.5)
PLATELET # BLD AUTO: 425 X10E3/UL (ref 150–450)
POTASSIUM SERPL-SCNC: 4.4 MMOL/L (ref 3.5–5.2)
PROT SERPL-MCNC: 8.1 G/DL (ref 6–8.5)
PROT UR QL STRIP: NEGATIVE
RBC # BLD AUTO: 4.68 X10E6/UL (ref 4.14–5.8)
SODIUM SERPL-SCNC: 139 MMOL/L (ref 134–144)
SP GR UR: 1.02 (ref 1–1.03)
TRIGL SERPL-MCNC: 110 MG/DL (ref 0–149)
TSH SERPL DL<=0.005 MIU/L-ACNC: 1.97 UIU/ML (ref 0.45–4.5)
UROBILINOGEN UR STRIP-MCNC: 0.2 MG/DL (ref 0.2–1)
VLDLC SERPL CALC-MCNC: 22 MG/DL (ref 5–40)
WBC # BLD AUTO: 9 X10E3/UL (ref 3.4–10.8)

## 2020-01-30 RX ORDER — METFORMIN HYDROCHLORIDE 500 MG/1
1000 TABLET, EXTENDED RELEASE ORAL
Qty: 60 TAB | Refills: 11 | Status: SHIPPED | OUTPATIENT
Start: 2020-01-30 | End: 2020-02-26 | Stop reason: SDUPTHER

## 2020-02-26 ENCOUNTER — CLINICAL SUPPORT (OUTPATIENT)
Dept: INTERNAL MEDICINE CLINIC | Age: 35
End: 2020-02-26

## 2020-02-26 DIAGNOSIS — I10 HTN (HYPERTENSION), MALIGNANT: Primary | ICD-10-CM

## 2020-02-26 RX ORDER — MINOXIDIL 10 MG/1
10 TABLET ORAL 2 TIMES DAILY
Qty: 60 TAB | Refills: 11 | Status: SHIPPED | OUTPATIENT
Start: 2020-02-26 | End: 2020-03-25

## 2020-02-26 RX ORDER — METFORMIN HYDROCHLORIDE 500 MG/1
1000 TABLET, EXTENDED RELEASE ORAL
Qty: 90 TAB | Refills: 11 | Status: SHIPPED | OUTPATIENT
Start: 2020-02-26 | End: 2020-04-15 | Stop reason: SDUPTHER

## 2020-02-27 VITALS
BODY MASS INDEX: 45.58 KG/M2 | DIASTOLIC BLOOD PRESSURE: 88 MMHG | WEIGHT: 291 LBS | HEART RATE: 89 BPM | SYSTOLIC BLOOD PRESSURE: 158 MMHG

## 2020-02-27 NOTE — PROGRESS NOTES
Pt came in for a bp check. Pt states he is currently taking   Amlodipine 10 mg 1 tab PO daily  Clonidine 0.3 mg 1 tab nightly   Losartan 100 mg 1 tab PO daily  Metoprolol Succinate 100 mg 1 tab PO nightly   Minoxidil 2.5 mg 3 tabs PO BID  Weight-291lb  Pulse-89  BP-158/88  Per Dr. Jaylon Au pt is to continue taking bp meds as directed above with no change and return in 1 month for another bp check.        Chel Camargo

## 2020-03-11 RX ORDER — METOPROLOL SUCCINATE 100 MG/1
100 TABLET, EXTENDED RELEASE ORAL
Qty: 30 TAB | Refills: 11 | Status: SHIPPED | OUTPATIENT
Start: 2020-03-11 | End: 2020-03-25

## 2020-03-25 ENCOUNTER — CLINICAL SUPPORT (OUTPATIENT)
Dept: INTERNAL MEDICINE CLINIC | Age: 35
End: 2020-03-25

## 2020-03-25 RX ORDER — MINOXIDIL 10 MG/1
10 TABLET ORAL 3 TIMES DAILY
Qty: 90 TAB | Refills: 11 | Status: SHIPPED | OUTPATIENT
Start: 2020-03-25 | End: 2020-04-15 | Stop reason: SDUPTHER

## 2020-03-25 RX ORDER — METOPROLOL SUCCINATE 100 MG/1
100 TABLET, EXTENDED RELEASE ORAL
Qty: 30 TAB | Refills: 11 | Status: SHIPPED | OUTPATIENT
Start: 2020-03-25 | End: 2020-04-15 | Stop reason: SDUPTHER

## 2020-03-31 RX ORDER — CLONIDINE HYDROCHLORIDE 0.3 MG/1
0.3 TABLET ORAL
Qty: 30 TAB | Refills: 11 | Status: SHIPPED | OUTPATIENT
Start: 2020-03-31 | End: 2020-04-15 | Stop reason: SDUPTHER

## 2020-04-08 ENCOUNTER — VIRTUAL VISIT (OUTPATIENT)
Dept: INTERNAL MEDICINE CLINIC | Age: 35
End: 2020-04-08

## 2020-04-08 DIAGNOSIS — I10 HTN (HYPERTENSION), MALIGNANT: ICD-10-CM

## 2020-04-08 DIAGNOSIS — I34.0 MILD MITRAL INSUFFICIENCY: Primary | ICD-10-CM

## 2020-04-08 DIAGNOSIS — E66.01 OBESITY, MORBID (HCC): ICD-10-CM

## 2020-04-08 DIAGNOSIS — E11.9 TYPE 2 DIABETES MELLITUS WITHOUT COMPLICATION, WITHOUT LONG-TERM CURRENT USE OF INSULIN (HCC): ICD-10-CM

## 2020-04-08 DIAGNOSIS — I51.89 GRADE II DIASTOLIC DYSFUNCTION: ICD-10-CM

## 2020-04-08 DIAGNOSIS — I27.20 PULMONARY HYPERTENSION, MODERATE TO SEVERE (HCC): ICD-10-CM

## 2020-04-08 NOTE — ASSESSMENT & PLAN NOTE
Stable, based on history, physical exam and review of pertinent labs, studies and medications; meds reconciled; continue current treatment plan. Key CAD CHF Meds             cloNIDine HCL (CATAPRES) 0.3 mg tablet Take 1 Tab by mouth nightly. metoprolol succinate (TOPROL-XL) 100 mg tablet Take 1 Tab by mouth nightly. minoxidiL (LONITEN) 10 mg tablet Take 1 Tab by mouth three (3) times daily. amLODIPine (NORVASC) 10 mg tablet Take 1 Tab by mouth daily. furosemide (LASIX) 40 mg tablet Take 1 Tab by mouth daily. losartan (COZAAR) 100 mg tablet Take 1 Tab by mouth daily.         Lab Results   Component Value Date/Time    B-type Natriuretic Peptide <2.5 03/27/2019 05:33 PM    Sodium 139 01/29/2020 04:45 PM    Potassium 4.4 01/29/2020 04:45 PM    Cholesterol, total 207 01/29/2020 04:45 PM    HDL Cholesterol 60 01/29/2020 04:45 PM    LDL, calculated 125 01/29/2020 04:45 PM    Triglyceride 110 01/29/2020 04:45 PM

## 2020-04-08 NOTE — ASSESSMENT & PLAN NOTE
Stable, based on history, physical exam and review of pertinent labs, studies and medications; meds reconciled; continue current treatment plan. Key Antihyperglycemic Medications             metFORMIN ER (GLUCOPHAGE XR) 500 mg tablet Take 2 Tabs by mouth daily (with dinner). And 1 with bf        Other Key Diabetic Medications             losartan (COZAAR) 100 mg tablet Take 1 Tab by mouth daily.         Lab Results   Component Value Date/Time    Hemoglobin A1c 8.0 01/29/2020 04:45 PM    Glucose 124 01/29/2020 04:45 PM    Creatinine 1.45 01/29/2020 04:45 PM    Microalb/Creat ratio (ug/mg creat.) 15 01/29/2020 04:45 PM    Cholesterol, total 207 01/29/2020 04:45 PM    HDL Cholesterol 60 01/29/2020 04:45 PM    LDL, calculated 125 01/29/2020 04:45 PM    Triglyceride 110 01/29/2020 04:45 PM     Diabetic Foot and Eye Exam HM Status   Topic Date Due    Eye Exam  11/11/1997    Diabetic Foot Care  01/29/2021

## 2020-04-08 NOTE — ASSESSMENT & PLAN NOTE
Stable, based on history, physical exam and review of pertinent labs, studies and medications; meds reconciled; continue current treatment plan. Key Obesity Meds             metFORMIN ER (GLUCOPHAGE XR) 500 mg tablet Take 2 Tabs by mouth daily (with dinner). And 1 with bf    furosemide (LASIX) 40 mg tablet Take 1 Tab by mouth daily.         Lab Results   Component Value Date/Time    Hemoglobin A1c 8.0 01/29/2020 04:45 PM    Glucose 124 01/29/2020 04:45 PM    Cholesterol, total 207 01/29/2020 04:45 PM    HDL Cholesterol 60 01/29/2020 04:45 PM    LDL, calculated 125 01/29/2020 04:45 PM    Triglyceride 110 01/29/2020 04:45 PM    TSH 1.970 01/29/2020 04:45 PM    Sodium 139 01/29/2020 04:45 PM    Potassium 4.4 01/29/2020 04:45 PM    ALT (SGPT) 23 01/29/2020 04:45 PM    AST (SGOT) 15 01/29/2020 04:45 PM

## 2020-04-08 NOTE — PROGRESS NOTES
Consent: Marilia Rubio, who was seen by synchronous (real-time) audio-video technology, and/or his healthcare decision maker, is aware that this patient-initiated, Telehealth encounter on 4/8/2020 is a billable service, with coverage as determined by his insurance carrier. He is aware that he may receive a bill and has provided verbal consent to proceed: Yes. Assessment & Plan:   Diagnoses and all orders for this visit:    1. Mild mitral insufficiency    2. Pulmonary hypertension, moderate to severe [facility])  Assessment & Plan:  Stable, based on history, physical exam and review of pertinent labs, studies and medications; meds reconciled; continue current treatment plan. Key CAD CHF Meds             cloNIDine HCL (CATAPRES) 0.3 mg tablet Take 1 Tab by mouth nightly. metoprolol succinate (TOPROL-XL) 100 mg tablet Take 1 Tab by mouth nightly. minoxidiL (LONITEN) 10 mg tablet Take 1 Tab by mouth three (3) times daily. amLODIPine (NORVASC) 10 mg tablet Take 1 Tab by mouth daily. furosemide (LASIX) 40 mg tablet Take 1 Tab by mouth daily. losartan (COZAAR) 100 mg tablet Take 1 Tab by mouth daily. Lab Results   Component Value Date/Time    B-type Natriuretic Peptide <2.5 03/27/2019 05:33 PM    Sodium 139 01/29/2020 04:45 PM    Potassium 4.4 01/29/2020 04:45 PM    Cholesterol, total 207 01/29/2020 04:45 PM    HDL Cholesterol 60 01/29/2020 04:45 PM    LDL, calculated 125 01/29/2020 04:45 PM    Triglyceride 110 01/29/2020 04:45 PM         3. Obesity, morbid ([address]  Assessment & Plan:  Stable, based on history, physical exam and review of pertinent labs, studies and medications; meds reconciled; continue current treatment plan. Key Obesity Meds             metFORMIN ER (GLUCOPHAGE XR) 500 mg tablet Take 2 Tabs by mouth daily (with dinner). And 1 with bf    furosemide (LASIX) 40 mg tablet Take 1 Tab by mouth daily.         Lab Results   Component Value Date/Time    Hemoglobin A1c 8.0 01/29/2020 04:45 PM    Glucose 124 01/29/2020 04:45 PM    Cholesterol, total 207 01/29/2020 04:45 PM    HDL Cholesterol 60 01/29/2020 04:45 PM    LDL, calculated 125 01/29/2020 04:45 PM    Triglyceride 110 01/29/2020 04:45 PM    TSH 1.970 01/29/2020 04:45 PM    Sodium 139 01/29/2020 04:45 PM    Potassium 4.4 01/29/2020 04:45 PM    ALT (SGPT) 23 01/29/2020 04:45 PM    AST (SGOT) 15 01/29/2020 04:45 PM             4. Type 2 diabetes mellitus without complication, without long-term current use of insulin (HCC)  Assessment & Plan:  Stable, based on history, physical exam and review of pertinent labs, studies and medications; meds reconciled; continue current treatment plan. Key Antihyperglycemic Medications             metFORMIN ER (GLUCOPHAGE XR) 500 mg tablet Take 2 Tabs by mouth daily (with dinner). And 1 with bf        Other Key Diabetic Medications             losartan (COZAAR) 100 mg tablet Take 1 Tab by mouth daily. Lab Results   Component Value Date/Time    Hemoglobin A1c 8.0 01/29/2020 04:45 PM    Glucose 124 01/29/2020 04:45 PM    Creatinine 1.45 01/29/2020 04:45 PM    Microalb/Creat ratio (ug/mg creat.) 15 01/29/2020 04:45 PM    Cholesterol, total 207 01/29/2020 04:45 PM    HDL Cholesterol 60 01/29/2020 04:45 PM    LDL, calculated 125 01/29/2020 04:45 PM    Triglyceride 110 01/29/2020 04:45 PM     Diabetic Foot and Eye Exam HM Status   Topic Date Due    Eye Exam  11/11/1997    Diabetic Foot Care  01/29/2021         5. Grade II diastolic dysfunction    6. HTN (hypertension), malignant          Patient has a known history of mitral insufficiency by echocardiogram which is currently asymptomatic. He has pulmonary hypertension for which we will reevaluate in about a year with an echocardiogram.  If there is progression or no no change will refer him to pulmonary associates. There is a history of diastolic dysfunction without symptoms.     He has a history of dyslipidemia LDLs at 125 we currently encourage a heart healthy diabetic weight reducing diet. Our main issue more recently has been his blood pressure. He purchased a blood pressure cuff today and has readings of 123/80 155/98 we instructed him on how to take his blood pressure it appears the cuff is too small we asked him to get back up and get a larger cuff. We will speak to him again in about a week. 712  Subjective:   Sandralee Bence is a 30 y.o. male who was seen for No chief complaint on file. Patient with known history of severe hypertension grade 2 diastolic dysfunction pulmonary hypertension obesity diabetes mellitus type is seen for evaluation. Currently is without complaints. He is currently at his mother's grandmother's house and we are concerned about her health as we just had a visit with her regarding the coronavirus. Patient is seen for evaluation. Prior to Admission medications    Medication Sig Start Date End Date Taking? Authorizing Provider   cloNIDine HCL (CATAPRES) 0.3 mg tablet Take 1 Tab by mouth nightly. 3/31/20   Inder Armenta MD   metoprolol succinate (TOPROL-XL) 100 mg tablet Take 1 Tab by mouth nightly. 3/25/20   Inder Armenta MD   minoxidiL (LONITEN) 10 mg tablet Take 1 Tab by mouth three (3) times daily. 3/25/20   Alvino Schmitz MD   metFORMIN ER (GLUCOPHAGE XR) 500 mg tablet Take 2 Tabs by mouth daily (with dinner). And 1 with bf 2/26/20   Inder Armenta MD   amLODIPine (NORVASC) 10 mg tablet Take 1 Tab by mouth daily. 3/27/19   Inder Armenta MD   furosemide (LASIX) 40 mg tablet Take 1 Tab by mouth daily. 3/4/19   Alvino Schmitz MD   losartan (COZAAR) 100 mg tablet Take 1 Tab by mouth daily. 1/8/19   Inder Armenta MD   ferrous gluconate 324 mg (37.5 mg iron) tablet Take 1 Tab by mouth every other day.  12/22/18   Alvino Schmitz MD     Not on File    Patient Active Problem List    Diagnosis Date Noted    Mild mitral insufficiency 12/20/2018  Pulmonary hypertension, moderate to severe ([facility] 12/10/2018    Grade II diastolic dysfunction     Obesity, morbid ([facility] 2018    DM2 (diabetes mellitus, type 2) ([facility] 2018    HTN (hypertension), malignant     Blurred vision, bilateral      Current Outpatient Medications   Medication Sig Dispense Refill    cloNIDine HCL (CATAPRES) 0.3 mg tablet Take 1 Tab by mouth nightly. 30 Tab 11    metoprolol succinate (TOPROL-XL) 100 mg tablet Take 1 Tab by mouth nightly. 30 Tab 11    minoxidiL (LONITEN) 10 mg tablet Take 1 Tab by mouth three (3) times daily. 90 Tab 11    metFORMIN ER (GLUCOPHAGE XR) 500 mg tablet Take 2 Tabs by mouth daily (with dinner). And 1 with bf 90 Tab 11    amLODIPine (NORVASC) 10 mg tablet Take 1 Tab by mouth daily. 30 Tab 11    furosemide (LASIX) 40 mg tablet Take 1 Tab by mouth daily. 30 Tab 3    losartan (COZAAR) 100 mg tablet Take 1 Tab by mouth daily. 30 Tab 11    ferrous gluconate 324 mg (37.5 mg iron) tablet Take 1 Tab by mouth every other day. 27 Tab 11     Not on File  Past Medical History:   Diagnosis Date    Blurred vision, bilateral     DM2 (diabetes mellitus, type 2) ([facility] 2018    Grade II diastolic dysfunction 51/10/2515    HTN (hypertension), malignant     Morbid obesity with BMI of 45.0-49.9, adult ([facility]     Non-compliance 12/10/2018    Pulmonary hypertension, moderate to severe ([facility] 12/10/2018    chip     No past surgical history on file. No family history on file.   Social History     Tobacco Use    Smoking status: Former Smoker     Last attempt to quit: 2018     Years since quittin.3    Smokeless tobacco: Never Used   Substance Use Topics    Alcohol use: No     REVIEW OF SYSTEMS:  General: negative for - chills or fever  ENT: negative for - headaches, nasal congestion or tinnitus  Respiratory: negative for - cough, hemoptysis, shortness of breath or wheezing  Cardiovascular : negative for - chest pain, edema, palpitations or shortness of breath  Gastrointestinal: negative for - abdominal pain, blood in stools, heartburn or nausea/vomiting  Genito-Urinary: no dysuria, trouble voiding, or hematuria  Musculoskeletal: negative for - gait disturbance, joint pain, joint stiffness or joint swelling  Neurological: no TIA or stroke symptoms  Hematologic: no bruises, no bleeding, no swollen glands  Integument: no lumps, mole changes, nail changes or rash  Endocrine:no malaise/lethargy or unexpected weight changes              Objective:   Vital Signs: (As obtained by patient/caregiver at home)          We discussed the expected course, resolution and complications of the diagnosis(es) in detail. Medication risks, benefits, costs, interactions, and alternatives were discussed as indicated. I advised him to contact the office if his condition worsens, changes or fails to improve as anticipated. He expressed understanding with the diagnosis(es) and plan. Ankur Dickerson is a 30 y.o. male being evaluated by a video visit encounter for concerns as above. A caregiver was present when appropriate. Due to this being a TeleHealth encounter ([facility] public health emergency), evaluation of the following organ systems was limited: Vitals/Constitutional/EENT/Resp/CV/GI//MS/Neuro/Skin/Heme-Lymph-Imm. Pursuant to the emergency declaration under the [facility] waiver authority and the [employer] and [employer] General Act, this Virtual  Visit was conducted, with patient's (and/or legal guardian's) consent, to reduce the patient's risk of exposure to COVID-19 and provide necessary medical care. Services were provided through a video synchronous discussion virtually to substitute for in-person clinic visit. Patient and provider were located at their individual homes.         Zayda Hampton MD

## 2020-04-15 ENCOUNTER — VIRTUAL VISIT (OUTPATIENT)
Dept: INTERNAL MEDICINE CLINIC | Age: 35
End: 2020-04-15

## 2020-04-15 DIAGNOSIS — I34.0 MILD MITRAL INSUFFICIENCY: ICD-10-CM

## 2020-04-15 DIAGNOSIS — E66.01 OBESITY, MORBID (HCC): ICD-10-CM

## 2020-04-15 DIAGNOSIS — E11.9 TYPE 2 DIABETES MELLITUS WITHOUT COMPLICATION, WITHOUT LONG-TERM CURRENT USE OF INSULIN (HCC): ICD-10-CM

## 2020-04-15 DIAGNOSIS — I10 HTN (HYPERTENSION), MALIGNANT: Primary | ICD-10-CM

## 2020-04-15 DIAGNOSIS — I51.89 GRADE II DIASTOLIC DYSFUNCTION: ICD-10-CM

## 2020-04-15 DIAGNOSIS — I27.20 PULMONARY HYPERTENSION, MODERATE TO SEVERE (HCC): ICD-10-CM

## 2020-04-15 RX ORDER — LOSARTAN POTASSIUM 100 MG/1
100 TABLET ORAL DAILY
Qty: 90 TAB | Refills: 3 | Status: SHIPPED | OUTPATIENT
Start: 2020-04-15 | End: 2020-05-18 | Stop reason: SDUPTHER

## 2020-04-15 RX ORDER — FUROSEMIDE 40 MG/1
40 TABLET ORAL DAILY
Qty: 90 TAB | Refills: 3 | Status: SHIPPED | OUTPATIENT
Start: 2020-04-15 | End: 2020-05-18

## 2020-04-15 RX ORDER — CLONIDINE HYDROCHLORIDE 0.3 MG/1
0.3 TABLET ORAL
Qty: 90 TAB | Refills: 11 | Status: SHIPPED | OUTPATIENT
Start: 2020-04-15 | End: 2020-11-03 | Stop reason: DRUGHIGH

## 2020-04-15 RX ORDER — MINOXIDIL 10 MG/1
20 TABLET ORAL 2 TIMES DAILY
Qty: 360 TAB | Refills: 3 | Status: SHIPPED | OUTPATIENT
Start: 2020-04-15 | End: 2020-04-22 | Stop reason: SDUPTHER

## 2020-04-15 RX ORDER — METOPROLOL SUCCINATE 100 MG/1
100 TABLET, EXTENDED RELEASE ORAL
Qty: 90 TAB | Refills: 3 | Status: SHIPPED | OUTPATIENT
Start: 2020-04-15 | End: 2020-05-07

## 2020-04-15 RX ORDER — AMLODIPINE BESYLATE 10 MG/1
10 TABLET ORAL DAILY
Qty: 90 TAB | Refills: 3 | Status: SHIPPED | OUTPATIENT
Start: 2020-04-15 | End: 2021-06-26

## 2020-04-15 RX ORDER — METFORMIN HYDROCHLORIDE 500 MG/1
1000 TABLET, EXTENDED RELEASE ORAL
Qty: 180 TAB | Refills: 3 | Status: SHIPPED | OUTPATIENT
Start: 2020-04-15 | End: 2020-04-30

## 2020-04-15 RX ORDER — FERROUS GLUCONATE 324(37.5)
324 TABLET ORAL EVERY OTHER DAY
Qty: 90 TAB | Refills: 11 | Status: SHIPPED | OUTPATIENT
Start: 2020-04-15

## 2020-04-15 NOTE — PROGRESS NOTES
Consent: Paemla Angel, who was seen by synchronous (real-time) audio-video technology, and/or his healthcare decision maker, is aware that this patient-initiated, Telehealth encounter on 4/15/2020 is a billable service, with coverage as determined by his insurance carrier. He is aware that he may receive a bill and has provided verbal consent to proceed: Yes. Assessment & Plan:   Diagnoses and all orders for this visit:    1. HTN (hypertension), malignant    2. Grade II diastolic dysfunction    3. Mild mitral insufficiency    4. Type 2 diabetes mellitus without complication, without long-term current use of insulin (HCC)    5. Obesity, morbid ([facility]    6. Pulmonary hypertension, moderate to severe (HCC)    Other orders  -     cloNIDine HCL (CATAPRES) 0.3 mg tablet; Take 1 Tab by mouth nightly. -     metoprolol succinate (TOPROL-XL) 100 mg tablet; Take 1 Tab by mouth nightly. -     minoxidiL (LONITEN) 10 mg tablet; Take 2 Tabs by mouth two (2) times a day. -     metFORMIN ER (GLUCOPHAGE XR) 500 mg tablet; Take 2 Tabs by mouth daily (with dinner). And 1 with bf  -     amLODIPine (NORVASC) 10 mg tablet; Take 1 Tab by mouth daily. -     furosemide (LASIX) 40 mg tablet; Take 1 Tab by mouth daily. -     losartan (COZAAR) 100 mg tablet; Take 1 Tab by mouth daily. -     ferrous gluconate 324 mg (37.5 mg iron) tablet; Take 1 Tab by mouth every other day. Blood pressure is the primary issue blood pressure readings are well will be sitting here and he taken it was 187/80. Close blood pressure in the 130s on one occasion when he first got the machine. Settled and then once in the 150s. Will increase the minoxidil to 20 mg twice a day continue all the other medications and refilling those for him too. We will see him again in 10 days for blood pressure reevaluation. Grade 2 diastolic dysfunction with no symptoms.   He was specifically concerned about peripheral edema which he does not have currently. Fortunately his blood sugar control is been excellent with metformin alone. Hopefully we can get him to decrease his weight with a heart healthy diabetic diet with the morbid obesity we certainly encourage a heart healthy weight reducing    We will see him again in 10 days. Follow-up and Dispositions    · Return in about 10 days (around 4/25/2020). 712  Subjective:   Xavi Ann is a 30 y.o. male who was seen for Hypertension  Patient is seen today as a telehealth visi at our request.  He has a known history of primary hypertension, grade 2 diastolic dysfunction, mild mitral insufficiency, diabetes mellitus type 2, morbid obesity, moderate to severe pulmonary hypertension. He has a blood pressure cuff and he is seen for evaluation without new complaints. Specifically there is no complaints of shortness of breath or swelling    Prior to Admission medications    Medication Sig Start Date End Date Taking? Authorizing Provider   cloNIDine HCL (CATAPRES) 0.3 mg tablet Take 1 Tab by mouth nightly. 4/15/20  Yes Yoni Dahl MD   metoprolol succinate (TOPROL-XL) 100 mg tablet Take 1 Tab by mouth nightly. 4/15/20  Yes Yoni Dahl MD   minoxidiL (LONITEN) 10 mg tablet Take 2 Tabs by mouth two (2) times a day. 4/15/20  Yes Yoni Dahl MD   metFORMIN ER (GLUCOPHAGE XR) 500 mg tablet Take 2 Tabs by mouth daily (with dinner). And 1 with bf 4/15/20  Yes Yoni Dahl MD   amLODIPine (NORVASC) 10 mg tablet Take 1 Tab by mouth daily. 4/15/20  Yes Yoni Dahl MD   furosemide (LASIX) 40 mg tablet Take 1 Tab by mouth daily. 4/15/20  Yes Yoni Dahl MD   losartan (COZAAR) 100 mg tablet Take 1 Tab by mouth daily. 4/15/20  Yes Yoni Dahl MD   ferrous gluconate 324 mg (37.5 mg iron) tablet Take 1 Tab by mouth every other day.  4/15/20  Yes Yoni Dahl MD     Not on File    REVIEW OF SYSTEMS as noted below except that noted in subjective:  General: negative for - chills or fever  ENT: negative for - headaches, nasal congestion or tinnitus  Respiratory: negative for - cough, hemoptysis, shortness of breath or wheezing  Cardiovascular : negative for - chest pain, edema, palpitations or shortness of breath  Gastrointestinal: negative for - abdominal pain, blood in stools, heartburn or nausea/vomiting  Genito-Urinary: no dysuria, trouble voiding, or hematuria  Musculoskeletal: negative for - gait disturbance, joint pain, joint stiffness or joint swelling  Neurological: no TIA or stroke symptoms  Hematologic: no bruises, no bleeding, no swollen glands  Integument: no lumps, mole changes, nail changes or rash  Endocrine:no malaise/lethargy or unexpected weight changes      Patient Active Problem List    Diagnosis Date Noted    Mild mitral insufficiency 12/20/2018    Pulmonary hypertension, moderate to severe ([facility] 12/10/2018    Grade II diastolic dysfunction 44/95/6162    Obesity, morbid ([facility] 04/03/2018    DM2 (diabetes mellitus, type 2) ([facility] 04/03/2018    HTN (hypertension), malignant     Blurred vision, bilateral      Current Outpatient Medications   Medication Sig Dispense Refill    cloNIDine HCL (CATAPRES) 0.3 mg tablet Take 1 Tab by mouth nightly. 90 Tab 11    metoprolol succinate (TOPROL-XL) 100 mg tablet Take 1 Tab by mouth nightly. 90 Tab 3    minoxidiL (LONITEN) 10 mg tablet Take 2 Tabs by mouth two (2) times a day. 360 Tab 3    metFORMIN ER (GLUCOPHAGE XR) 500 mg tablet Take 2 Tabs by mouth daily (with dinner). And 1 with bf 180 Tab 3    amLODIPine (NORVASC) 10 mg tablet Take 1 Tab by mouth daily. 90 Tab 3    furosemide (LASIX) 40 mg tablet Take 1 Tab by mouth daily. 90 Tab 3    losartan (COZAAR) 100 mg tablet Take 1 Tab by mouth daily. 90 Tab 3    ferrous gluconate 324 mg (37.5 mg iron) tablet Take 1 Tab by mouth every other day.  90 Tab 11     Not on File  Past Medical History:   Diagnosis Date    Blurred vision, bilateral     DM2 (diabetes mellitus, type 2) ([facility] 2018    Grade II diastolic dysfunction     HTN (hypertension), malignant     Morbid obesity with BMI of 45.0-49.9, adult ([facility]     Non-compliance 12/10/2018    Pulmonary hypertension, moderate to severe ([facility] 12/10/2018    chip     History reviewed. No pertinent surgical history. History reviewed. No pertinent family history. Social History     Tobacco Use    Smoking status: Former Smoker     Last attempt to quit: 2018     Years since quittin.3    Smokeless tobacco: Never Used   Substance Use Topics    Alcohol use: No       ROS        Objective:   Vital Signs: (As obtained by patient/caregiver at home)  There were no vitals taken for this visit.      [INSTRUCTIONS:  \"[x]\" Indicates a positive item  \"[]\" Indicates a negative item  -- DELETE ALL ITEMS NOT EXAMINED]    Constitutional: [x] Appears well-developed and well-nourished [x] No apparent distress      [] Abnormal -     Mental status: [x] Alert and awake  [x] Oriented to person/place/time [x] Able to follow commands    [] Abnormal -     Eyes:   EOM    [x]  Normal    [] Abnormal -   Sclera  [x]  Normal    [] Abnormal -          Discharge [x]  None visible   [] Abnormal -     HENT: [x] Normocephalic, atraumatic  [] Abnormal -   [x] Mouth/Throat: Mucous membranes are moist    External Ears [x] Normal  [] Abnormal -    Neck: [x] No visualized mass [] Abnormal -     Pulmonary/Chest: [x] Respiratory effort normal   [x] No visualized signs of difficulty breathing or respiratory distress        [] Abnormal -      Musculoskeletal:   [x] Normal gait with no signs of ataxia         [x] Normal range of motion of neck        [] Abnormal -     Neurological:        [x] No Facial Asymmetry (Cranial nerve 7 motor function) (limited exam due to video visit)          [x] No gaze palsy        [] Abnormal -          Skin:        [x] No significant exanthematous lesions or discoloration noted on facial skin         [] Abnormal -            Psychiatric:       [x] Normal Affect [] Abnormal -        [x] No Hallucinations    Other pertinent observable physical exam findings:-        We discussed the expected course, resolution and complications of the diagnosis(es) in detail. Medication risks, benefits, costs, interactions, and alternatives were discussed as indicated. I advised him to contact the office if his condition worsens, changes or fails to improve as anticipated. He expressed understanding with the diagnosis(es) and plan. Yissel Delgadillo is a 30 y.o. male being evaluated by a video visit encounter for concerns as above. A caregiver was present when appropriate. Due to this being a TeleHealth encounter (During [facility] public health emergency), evaluation of the following organ systems was limited: Vitals/Constitutional/EENT/Resp/CV/GI//MS/Neuro/Skin/Heme-Lymph-Imm. Pursuant to the emergency declaration under the [facility] [place] [address] waiver authority and the [employer] Act, this Virtual  Visit was conducted, with patient's (and/or legal guardian's) consent, to reduce the patient's risk of exposure to COVID-19 and provide necessary medical care. Services were provided through a video synchronous discussion virtually to substitute for in-person clinic visit. Patient and provider were located at their individual homes. Carolynn Wesley MD    Please note that portions of this dictation may have been recorded with voice recognition software. Some unanticipated grammatical, syntax, homophones, and other interpretive errors are inadvertently transcribed by the computer software. An attempt at proof reading has been made to minimize errors and omissions. Please disregard these errors. Thank you.

## 2020-04-15 NOTE — PROGRESS NOTES
1. Have you been to the ER, urgent care clinic since your last visit? Hospitalized since your last visit? No    2. Have you seen or consulted any other health care providers outside of the [address] [place] [address] since your last visit? Include any pap smears or colon screening.  No     Requesting medication refill

## 2020-04-22 RX ORDER — MINOXIDIL 10 MG/1
20 TABLET ORAL 2 TIMES DAILY
Qty: 360 TAB | Refills: 3 | Status: SHIPPED | OUTPATIENT
Start: 2020-04-22 | End: 2020-04-30

## 2020-04-30 ENCOUNTER — VIRTUAL VISIT (OUTPATIENT)
Dept: INTERNAL MEDICINE CLINIC | Age: 35
End: 2020-04-30

## 2020-04-30 VITALS — SYSTOLIC BLOOD PRESSURE: 158 MMHG | DIASTOLIC BLOOD PRESSURE: 93 MMHG | HEART RATE: 100 BPM

## 2020-04-30 DIAGNOSIS — Z00.00 MEDICARE ANNUAL WELLNESS VISIT, SUBSEQUENT: Primary | ICD-10-CM

## 2020-04-30 DIAGNOSIS — I51.89 GRADE II DIASTOLIC DYSFUNCTION: ICD-10-CM

## 2020-04-30 DIAGNOSIS — I34.0 MILD MITRAL INSUFFICIENCY: ICD-10-CM

## 2020-04-30 DIAGNOSIS — I10 HTN (HYPERTENSION), MALIGNANT: ICD-10-CM

## 2020-04-30 DIAGNOSIS — Z13.39 SCREENING FOR ALCOHOLISM: ICD-10-CM

## 2020-04-30 DIAGNOSIS — Z13.31 SCREENING FOR DEPRESSION: ICD-10-CM

## 2020-04-30 DIAGNOSIS — I27.20 PULMONARY HYPERTENSION, MODERATE TO SEVERE (HCC): ICD-10-CM

## 2020-04-30 DIAGNOSIS — E11.9 TYPE 2 DIABETES MELLITUS WITHOUT COMPLICATION, WITHOUT LONG-TERM CURRENT USE OF INSULIN (HCC): ICD-10-CM

## 2020-04-30 DIAGNOSIS — E66.01 OBESITY, MORBID (HCC): ICD-10-CM

## 2020-04-30 RX ORDER — METFORMIN HYDROCHLORIDE 500 MG/1
1000 TABLET, EXTENDED RELEASE ORAL
Qty: 360 TAB | Refills: 3 | Status: SHIPPED | OUTPATIENT
Start: 2020-04-30 | End: 2021-08-29

## 2020-04-30 RX ORDER — MINOXIDIL 10 MG/1
30 TABLET ORAL 2 TIMES DAILY
Qty: 540 TAB | Refills: 3 | Status: SHIPPED | OUTPATIENT
Start: 2020-04-30 | End: 2020-12-09 | Stop reason: SDUPTHER

## 2020-04-30 NOTE — PROGRESS NOTES
1. Have you been to the ER, urgent care clinic since your last visit? Hospitalized since your last visit? No    2. Have you seen or consulted any other health care providers outside of the [address] [place] [address] since your last visit? Include any pap smears or colon screening. No     BP    This is the Subsequent Medicare Annual Wellness Exam, performed 12 months or more after the Initial AWV or the last Subsequent AWV    Consent: Nayely Ordoñez, who was seen by synchronous (real-time) audio-video technology, and/or his healthcare decision maker, is aware that this patient-initiated, Telehealth encounter on 4/30/2020 is a billable service. While AWVs are fully covered by Medicare, any services rendered on this date that are not included in an AWV are subject to additional billing, with coverage as determined by his insurance carrier. He is aware that he may receive a bill for any such additional services and has provided verbal consent to proceed: No - Not billable. I have reviewed the patient's medical history in detail and updated the computerized patient record. History     Patient Active Problem List   Diagnosis Code    Obesity, morbid (HCC) E66.01    HTN (hypertension), malignant I10    Blurred vision, bilateral H53.8    DM2 (diabetes mellitus, type 2) ([address] E11.9    Pulmonary hypertension, moderate to severe ([address] I27.20    Grade II diastolic dysfunction S29.8    Mild mitral insufficiency I34.0     Past Medical History:   Diagnosis Date    Blurred vision, bilateral     DM2 (diabetes mellitus, type 2) ([address] 04/03/2018    Grade II diastolic dysfunction 60/32/4979    HTN (hypertension), malignant     Morbid obesity with BMI of 45.0-49.9, adult ([address]     Non-compliance 12/10/2018    Pulmonary hypertension, moderate to severe ([address] 12/10/2018    chip      No past surgical history on file.   Current Outpatient Medications   Medication Sig Dispense Refill    minoxidiL (LONITEN) 10 mg tablet Take 2 Tabs by mouth two (2) times a day. 360 Tab 3    cloNIDine HCL (CATAPRES) 0.3 mg tablet Take 1 Tab by mouth nightly. 90 Tab 11    metoprolol succinate (TOPROL-XL) 100 mg tablet Take 1 Tab by mouth nightly. 90 Tab 3    metFORMIN ER (GLUCOPHAGE XR) 500 mg tablet Take 2 Tabs by mouth daily (with dinner). And 1 with bf 180 Tab 3    amLODIPine (NORVASC) 10 mg tablet Take 1 Tab by mouth daily. 90 Tab 3    furosemide (LASIX) 40 mg tablet Take 1 Tab by mouth daily. 90 Tab 3    losartan (COZAAR) 100 mg tablet Take 1 Tab by mouth daily. 90 Tab 3    ferrous gluconate 324 mg (37.5 mg iron) tablet Take 1 Tab by mouth every other day. 90 Tab 11     Not on File    No family history on file. Social History     Tobacco Use    Smoking status: Former Smoker     Last attempt to quit: 2018     Years since quittin.3    Smokeless tobacco: Never Used   Substance Use Topics    Alcohol use: No       Depression Risk Factor Screening:     3 most recent PHQ Screens 2020   Little interest or pleasure in doing things Not at all   Feeling down, depressed, irritable, or hopeless Not at all   Total Score PHQ 2 0       Alcohol Risk Factor Screening (MALE < 65): Do you average more than 2 drinks per night or 14 drinks a week: No    On any one occasion in the past three months have you have had more than 4 drinks containing alcohol:  No      Functional Ability and Level of Safety:   Hearing: Hearing is good. Activities of Daily Living: The home contains: no safety equipment. Patient does total self care    Ambulation: with no difficulty    Fall Risk:  No flowsheet data found.     Abuse Screen:  Patient is not abused    Cognitive Screening   Has your family/caregiver stated any concerns about your memory: no  Cognitive Screening: not necessary    Patient Care Team   Patient Care Team:  Yossi Mccarty MD as PCP - General (Internal Medicine)  Yossi Mccarty MD as PCP - [facility] Provider    Assessment/Plan   Education and counseling provided:  Are appropriate based on today's review and evaluation        Health Maintenance Due   Topic Date Due    Pneumococcal 0-64 years (1 of 1 - PPSV23) 11/11/1993    Eye Exam Retinal or Dilated  11/11/1997    Medicare Yearly Exam  03/27/2020         Kartik Davila is a 30 y.o. male who was evaluated by a video visit encounter for concerns as above. Patient identification was verified prior to start of the visit. A caregiver was present when appropriate. Due to this being a TeleHealth encounter (During [facility]-23 public health emergency), evaluation of the following organ systems was limited: Vitals/Constitutional/EENT/Resp/CV/GI//MS/Neuro/Skin/Heme-Lymph-Imm. Pursuant to the emergency declaration under the [facility], 1135 waiver authority and the [employer] Act, this Virtual  Visit was conducted, with patient's (and/or legal guardian's) consent, to reduce the patient's risk of exposure to COVID-19 and provide necessary medical care. Services were provided through a video synchronous discussion virtually to substitute for in-person clinic visit. Patient and provider were located at their individual homes.     Jyotsna Taveras LPN

## 2020-04-30 NOTE — PROGRESS NOTES
Yuliet Martinez is a 30 y.o. male who was seen by synchronous (real-time) audio-video technology on 4/30/2020. Consent: Yuliet Martinez, who was seen by synchronous (real-time) audio-video technology, and/or his healthcare decision maker, is aware that this patient-initiated, Telehealth encounter on 4/30/2020 is a billable service, with coverage as determined by his insurance carrier. He is aware that he may receive a bill and has provided verbal consent to proceed: Yes. Assessment & Plan:   Diagnoses and all orders for this visit:    1. Type 2 diabetes mellitus without complication, without long-term current use of insulin ([facility] his blood sugar control is less than ideal with hemoglobin A1c of 8.0 on January 29, 2020. He is currently taking metformin 1000 mg with supper and 500 mg with breakfast.  We will increase the dose to thousand milligrams twice a day. We will check his hemoglobin A1c next month we will recheck his renal function. 2. Medicare annual wellness visit, subsequent    3. Screening for alcoholism  -     MO ANNUAL ALCOHOL SCREEN 15 MIN    4. Screening for depression  -     DEPRESSION SCREEN ANNUAL    5. Obesity, morbid ([facility] we encourage a heart healthy diabetic weight reducing diet with physical activity for 30 minutes 5 days a week. We will check a sleep study as this may be an issue contributing to the difficulty in controlling his blood pressure. 6. Pulmonary hypertension, moderate to severe ([facility]    7. Mild mitral insufficiency    8. Grade II diastolic dysfunction he has no evidence of cardiac decompensation which we have been following very carefully in view of the fact that we are using higher doses of Monday    9. HTN (hypertension), malignant blood pressure remains in the 150s over 90s for that reason we will increase the minoxidil to 30 mg twice a day.   He will see us in 1 month but weekly he will call the nurse for blood pressure check and to let us know if he is developing any swelling or shortness of breath. Patient's grandmother tells us that he has a learning disability. At the age of 6 he has had falling and since that time he had trouble with memory. He could not remember anything for that reason they gave her Medicare disability for the learning disability. Subjective:   Kaelyn Estevez is a 30 y.o. male who was seen for Hypertension  Patient is seen for a virtual, video, telehealth care visit. He voices no new complaints. He has a known history of diabetes mellitus type 2, morbid obesity, moderate to severe pulmonary hypertension, mild mitral vegetation, grade 2 diastolic dysfunction, malignant hypertension. Is on multiple medications and now at minoxidil 20 mg twice daily with blood pressures remain elevated. Prior to Admission medications    Medication Sig Start Date End Date Taking? Authorizing Provider   minoxidiL (LONITEN) 10 mg tablet Take 3 Tabs by mouth two (2) times a day. 4/30/20  Yes Ricky Pacheco MD   metFORMIN ER (GLUCOPHAGE XR) 500 mg tablet Take 2 Tabs by mouth Before breakfast and dinner. And 1 with bf 4/30/20  Yes Ricky Pacheco MD   cloNIDine HCL (CATAPRES) 0.3 mg tablet Take 1 Tab by mouth nightly. 4/15/20  Yes Ricky Pacheco MD   metoprolol succinate (TOPROL-XL) 100 mg tablet Take 1 Tab by mouth nightly. 4/15/20  Yes Ricky Pacheco MD   amLODIPine (NORVASC) 10 mg tablet Take 1 Tab by mouth daily. 4/15/20  Yes Ricky Pacheco MD   furosemide (LASIX) 40 mg tablet Take 1 Tab by mouth daily. 4/15/20  Yes Ricky Pacheco MD   losartan (COZAAR) 100 mg tablet Take 1 Tab by mouth daily. 4/15/20  Yes Ricky Pacheco MD   ferrous gluconate 324 mg (37.5 mg iron) tablet Take 1 Tab by mouth every other day.  4/15/20  Yes Ricky Pacheco MD     Not on File    REVIEW OF SYSTEMS as noted below except that noted in subjective:  General: negative for - chills or fever  ENT: negative for - headaches, nasal congestion or tinnitus  Respiratory: negative for - cough, hemoptysis, shortness of breath or wheezing  Cardiovascular : negative for - chest pain, edema, palpitations or shortness of breath  Gastrointestinal: negative for - abdominal pain, blood in stools, heartburn or nausea/vomiting  Genito-Urinary: no dysuria, trouble voiding, or hematuria  Musculoskeletal: negative for - gait disturbance, joint pain, joint stiffness or joint swelling  Neurological: no TIA or stroke symptoms  Hematologic: no bruises, no bleeding, no swollen glands  Integument: no lumps, mole changes, nail changes or rash  Endocrine:no malaise/lethargy or unexpected weight changes      Patient Active Problem List   Diagnosis Code    Obesity, morbid ([facility]) E66.01    HTN (hypertension), malignant I10    Blurred vision, bilateral H53.8    DM2 (diabetes mellitus, type 2) ([facility] E11.9    Pulmonary hypertension, moderate to severe ([facility]) I27.20    Grade II diastolic dysfunction C09.6    Mild mitral insufficiency I34.0    Learning disability F81.9     Patient Active Problem List    Diagnosis Date Noted    Mild mitral insufficiency 12/20/2018    Pulmonary hypertension, moderate to severe ([facility] 12/10/2018    Grade II diastolic dysfunction 94/28/2939    Obesity, morbid ([facility] 04/03/2018    DM2 (diabetes mellitus, type 2) ([facility] 04/03/2018    HTN (hypertension), malignant     Blurred vision, bilateral     Learning disability 1991     Current Outpatient Medications   Medication Sig Dispense Refill    minoxidiL (LONITEN) 10 mg tablet Take 3 Tabs by mouth two (2) times a day. 540 Tab 3    metFORMIN ER (GLUCOPHAGE XR) 500 mg tablet Take 2 Tabs by mouth Before breakfast and dinner. And 1 with bf 360 Tab 3    cloNIDine HCL (CATAPRES) 0.3 mg tablet Take 1 Tab by mouth nightly. 90 Tab 11    metoprolol succinate (TOPROL-XL) 100 mg tablet Take 1 Tab by mouth nightly.  90 Tab 3    amLODIPine (NORVASC) 10 mg tablet Take 1 Tab by mouth daily. 90 Tab 3    furosemide (LASIX) 40 mg tablet Take 1 Tab by mouth daily. 90 Tab 3    losartan (COZAAR) 100 mg tablet Take 1 Tab by mouth daily. 90 Tab 3    ferrous gluconate 324 mg (37.5 mg iron) tablet Take 1 Tab by mouth every other day. 80 Tab 11     Not on File  Past Medical History:   Diagnosis Date    Blurred vision, bilateral     DM2 (diabetes mellitus, type 2) ([address] 2018    Grade II diastolic dysfunction     HTN (hypertension), malignant     Learning disability     fell hit head and thereafter could not remember according to     Morbid obesity with BMI of 45.0-49.9, adult ([address]     Non-compliance 12/10/2018    [facility], [facility] 12/10/2018    chip     No past surgical history on file. No family history on file.   Social History     Tobacco Use    Smoking status: Former Smoker     Last attempt to quit: 2018     Years since quittin.3    Smokeless tobacco: Never Used   Substance Use Topics    Alcohol use: No       ROS    Objective:   Vital Signs: (As obtained by patient/caregiver at home)  Visit Vitals  BP (!) 158/93   Pulse 100        [INSTRUCTIONS:  \"[x]\" Indicates a positive item  \"[]\" Indicates a negative item  -- DELETE ALL ITEMS NOT EXAMINED]    Constitutional: [x] Appears well-developed and well-nourished [x] No apparent distress      [] Abnormal -     Mental status: [x] Alert and awake  [x] Oriented to person/place/time [x] Able to follow commands    [] Abnormal -     Eyes:   EOM    [x]  Normal    [] Abnormal -   Sclera  [x]  Normal    [] Abnormal -          Discharge [x]  None visible   [] Abnormal -     HENT: [x] Normocephalic, atraumatic  [] Abnormal -   [x] Mouth/Throat: Mucous membranes are moist    External Ears [x] Normal  [] Abnormal -    Neck: [x] No visualized mass [] Abnormal -     Pulmonary/Chest: [x] Respiratory effort normal   [x] No visualized signs of difficulty breathing or respiratory distress        [] Abnormal -      Musculoskeletal:   [x] Normal gait with no signs of ataxia         [x] Normal range of motion of neck        [] Abnormal -     Neurological:        [x] No Facial Asymmetry (Cranial nerve 7 motor function) (limited exam due to video visit)          [x] No gaze palsy        [] Abnormal -          Skin:        [x] No significant exanthematous lesions or discoloration noted on facial skin         [] Abnormal -            Psychiatric:       [x] Normal Affect [] Abnormal -        [x] No Hallucinations    Other pertinent observable physical exam findings:-        We discussed the expected course, resolution and complications of the diagnosis(es) in detail. Medication risks, benefits, costs, interactions, and alternatives were discussed as indicated. I advised him to contact the office if his condition worsens, changes or fails to improve as anticipated. He expressed understanding with the diagnosis(es) and plan. Yissel Delgadillo is a 30 y.o. male who was evaluated by a video visit encounter for concerns as above. Patient identification was verified prior to start of the visit. A caregiver was present when appropriate. Due to this being a TeleHealth encounter (During [facility]- public [facility] emergency), evaluation of the following organ systems was limited: Vitals/Constitutional/EENT/Resp/CV/GI//MS/Neuro/Skin/Heme-Lymph-Imm. Pursuant to the emergency declaration under the [facility], 1135 waiver authority and the [employer] Act, this Virtual  Visit was conducted, with patient's (and/or legal guardian's) consent, to reduce the patient's risk of exposure to COVID-19 and provide necessary medical care. Services were provided through a video synchronous discussion virtually to substitute for in-person clinic visit. Patient and provider were located at their individual homes.       Joana Andersen Sarahi Carpio MD    Please note that portions of this dictation may have been recorded with voice recognition software. Some unanticipated grammatical, syntax, homophones, and other interpretive errors are inadvertently transcribed by the computer software. An attempt at proof reading has been made to minimize errors and omissions. Please disregard these errors. Thank you.

## 2020-04-30 NOTE — PATIENT INSTRUCTIONS
Medicare Wellness Visit, Male The best way to live healthy is to have a lifestyle where you eat a well-balanced diet, exercise regularly, limit alcohol use, and quit all forms of tobacco/nicotine, if applicable. Regular preventive services are another way to keep healthy. Preventive services (vaccines, screening tests, monitoring & exams) can help personalize your care plan, which helps you manage your own care. Screening tests can find health problems at the earliest stages, when they are easiest to treat. [facility] follows the current, evidence-based guidelines published by the [facility] ([facility]) when recommending preventive services for our patients. Because we follow these guidelines, sometimes recommendations change over time as research supports it. (For example, a prostate screening blood test is no longer routinely recommended for men with no symptoms). Of course, you and your doctor may decide to screen more often for some diseases, based on your risk and co-morbidities (chronic disease you are already diagnosed with). Preventive services for you include: - Medicare offers their members a free annual wellness visit, which is time for you and your primary care provider to discuss and plan for your preventive service needs. Take advantage of this benefit every year! 
-All adults over age 72 should receive the recommended pneumonia vaccines. Current USPSTF guidelines recommend a series of two vaccines for the best pneumonia protection.  
-All adults should have a flu vaccine yearly and tetanus vaccine every 10 years. 
-All adults age 48 and older should receive the shingles vaccines (series of two vaccines).       
-All adults age 38-68 who are overweight should have a diabetes screening test once every three years.  
-Other screening tests & preventive services for persons with diabetes include: an eye exam to screen for diabetic retinopathy, a kidney function test, a foot exam, and stricter control over your cholesterol.  
-Cardiovascular screening for adults with routine risk involves an electrocardiogram (ECG) at intervals determined by the provider.  
-Colorectal cancer screening should be done for adults age 54-65 with no increased risk factors for colorectal cancer. There are a number of acceptable methods of screening for this type of cancer. Each test has its own benefits and drawbacks. Discuss with your provider what is most appropriate for you during your annual wellness visit. The different tests include: colonoscopy (considered the best screening method), a fecal occult blood test, a fecal DNA test, and sigmoidoscopy. 
-All adults born between [facility] should be screened once for Hepatitis C. 
-An Abdominal Aortic Aneurysm (AAA) Screening is recommended for men age 73-68 who has ever smoked in their lifetime. Here is a list of your current Health Maintenance items (your personalized list of preventive services) with a due date: 
Health Maintenance Due Topic Date Due  Pneumococcal Vaccine (1 of 1 - PPSV23) 11/11/1993 Deidre Bunch Eye Exam  11/11/1997 Deidre Bunch Annual Well Visit  03/27/2020

## 2020-05-07 RX ORDER — METOPROLOL SUCCINATE 100 MG/1
200 TABLET, EXTENDED RELEASE ORAL
Qty: 90 TAB | Refills: 3 | Status: SHIPPED | OUTPATIENT
Start: 2020-05-07 | End: 2020-10-13 | Stop reason: SDUPTHER

## 2020-05-18 ENCOUNTER — OFFICE VISIT (OUTPATIENT)
Dept: INTERNAL MEDICINE CLINIC | Age: 35
End: 2020-05-18

## 2020-05-18 ENCOUNTER — HOSPITAL ENCOUNTER (OUTPATIENT)
Dept: NON INVASIVE DIAGNOSTICS | Age: 35
Discharge: HOME | End: 2020-05-18
Attending: INTERNAL MEDICINE
Payer: MEDICARE

## 2020-05-18 VITALS
SYSTOLIC BLOOD PRESSURE: 162 MMHG | WEIGHT: 286.6 LBS | HEIGHT: 67 IN | BODY MASS INDEX: 44.98 KG/M2 | DIASTOLIC BLOOD PRESSURE: 80 MMHG | HEART RATE: 91 BPM | RESPIRATION RATE: 20 BRPM | TEMPERATURE: 97.8 F | OXYGEN SATURATION: 98 %

## 2020-05-18 VITALS
WEIGHT: 286.6 LBS | HEIGHT: 67 IN | SYSTOLIC BLOOD PRESSURE: 162 MMHG | BODY MASS INDEX: 44.98 KG/M2 | DIASTOLIC BLOOD PRESSURE: 80 MMHG

## 2020-05-18 DIAGNOSIS — E66.01 OBESITY, MORBID (HCC): ICD-10-CM

## 2020-05-18 DIAGNOSIS — R06.02 SOB (SHORTNESS OF BREATH): ICD-10-CM

## 2020-05-18 DIAGNOSIS — I27.20 PULMONARY HYPERTENSION, MODERATE TO SEVERE (HCC): ICD-10-CM

## 2020-05-18 DIAGNOSIS — I10 HTN (HYPERTENSION), MALIGNANT: Primary | ICD-10-CM

## 2020-05-18 DIAGNOSIS — E11.9 TYPE 2 DIABETES MELLITUS WITHOUT COMPLICATION, WITHOUT LONG-TERM CURRENT USE OF INSULIN (HCC): ICD-10-CM

## 2020-05-18 DIAGNOSIS — F81.9 LEARNING DISABILITY: ICD-10-CM

## 2020-05-18 DIAGNOSIS — I51.89 GRADE II DIASTOLIC DYSFUNCTION: ICD-10-CM

## 2020-05-18 LAB
ECHO LV E' LATERAL VELOCITY: 12.82 CM/S
ECHO LV E' SEPTAL VELOCITY: 11.16 CM/S
ECHO MV E VELOCITY: 99.57 CM/S
ECHO MV E/E' LATERAL: 7.77
ECHO MV E/E' RATIO (AVERAGED): 8.34
ECHO MV E/E' SEPTAL: 8.92
ECHO RV TAPSE: 3.57 CM (ref 1.5–2)
ECHO TV REGURGITANT MAX VELOCITY: 231.98 CM/S
ECHO TV REGURGITANT PEAK GRADIENT: 21.5 MMHG

## 2020-05-18 PROCEDURE — 93306 TTE W/DOPPLER COMPLETE: CPT

## 2020-05-18 RX ORDER — FUROSEMIDE 40 MG/1
40 TABLET ORAL 2 TIMES DAILY
Qty: 180 TAB | Refills: 3 | Status: SHIPPED | OUTPATIENT
Start: 2020-05-18 | End: 2021-07-10

## 2020-05-18 RX ORDER — LOSARTAN POTASSIUM 100 MG/1
100 TABLET ORAL DAILY
Qty: 90 TAB | Refills: 3 | Status: SHIPPED | OUTPATIENT
Start: 2020-05-18 | End: 2020-06-30 | Stop reason: CLARIF

## 2020-05-18 NOTE — PROGRESS NOTES
1. Have you been to the ER, urgent care clinic since your last visit? Hospitalized since your last visit? No    2. Have you seen or consulted any other health care providers outside of the [address] [place] [address] since your last visit? Include any pap smears or colon screening.  No       Leg swelling

## 2020-05-18 NOTE — PROGRESS NOTES
SPORTS MEDICINE AND PRIMARY CARE  Laura Li MD, [place], [place] [address] [address]  Phone:  [phone]  Fax: [phone]       Chief Complaint   Patient presents with    Leg Swelling   . SUBJECTIVE:    Rayshawn Cherry is a 30 y.o. male Patient comes in at our request because of swelling. He has a known history of learning disability which is moderate to severe; morbid obesity; type 2 diabetes; diastolic dysfunction, grade 2; primary hypertension; and is seen for evaluation. Specifically, he denies chest pain and dyspnea but does note swelling. Current Outpatient Medications   Medication Sig Dispense Refill    losartan (COZAAR) 100 mg tablet Take 1 Tab by mouth daily. 90 Tab 3    furosemide (LASIX) 40 mg tablet Take 1 Tab by mouth two (2) times a day. 180 Tab 3    metoprolol succinate (TOPROL-XL) 100 mg tablet Take 2 Tabs by mouth nightly. 90 Tab 3    minoxidiL (LONITEN) 10 mg tablet Take 3 Tabs by mouth two (2) times a day. 540 Tab 3    metFORMIN ER (GLUCOPHAGE XR) 500 mg tablet Take 2 Tabs by mouth Before breakfast and dinner. And 1 with bf 360 Tab 3    cloNIDine HCL (CATAPRES) 0.3 mg tablet Take 1 Tab by mouth nightly. 90 Tab 11    amLODIPine (NORVASC) 10 mg tablet Take 1 Tab by mouth daily. 90 Tab 3    ferrous gluconate 324 mg (37.5 mg iron) tablet Take 1 Tab by mouth every other day. 80 Tab 11     Past Medical History:   Diagnosis Date    Blurred vision, bilateral     DM2 (diabetes mellitus, type 2) ([address] 04/03/2018    Grade II diastolic dysfunction 94/54/5068    HTN (hypertension), malignant     Learning disability 1991    fell hit head and thereafter could not remember according to     Morbid obesity with BMI of 45.0-49.9, adult ([address]     Non-compliance 12/10/2018    Pulmonary hypertension, moderate to severe ([address] 12/10/2018    chip     History reviewed. No pertinent surgical history.   Not on File      REVIEW OF SYSTEMS:  General: negative for - chills or fever  ENT: negative for - headaches, nasal congestion or tinnitus  Respiratory: negative for - cough, hemoptysis, shortness of breath or wheezing  Cardiovascular : negative for - chest pain, edema, palpitations or shortness of breath  Gastrointestinal: negative for - abdominal pain, blood in stools, heartburn or nausea/vomiting  Genito-Urinary: no dysuria, trouble voiding, or hematuria  Musculoskeletal: negative for - gait disturbance, joint pain, joint stiffness or joint swelling  Neurological: no TIA or stroke symptoms  Hematologic: no bruises, no bleeding, no swollen glands  Integument: no lumps, mole changes, nail changes or rash  Endocrine: no malaise/lethargy or unexpected weight changes      Social History     Socioeconomic History    Marital status: SINGLE     Spouse name: Not on file    Number of children: Not on file    Years of education: Not on file    Highest education level: Not on file   Tobacco Use    Smoking status: Former Smoker     Last attempt to quit: 2018     Years since quittin.4    Smokeless tobacco: Never Used   Substance and Sexual Activity    Alcohol use: No    Drug use: No    Sexual activity: Not Currently   Social History Narrative    Medical History: KeloidObesity    Surgical History: Denies Past Surgical History    Hospitalization/Major Diagnostic Procedure: Denies Past Hospitalization    Family History: Mother: alive 37 yrs a/w Father: alive 36 yrs a/w Sister(s): alive Brother(s): alive Grandmother: alive 64 [facility]rs [facility] 3 brother(s) , 2 sister(s) . Social History: Alcohol Use Patient does not use alcohol. Smoking Status Patient is a current every day smoker, Received cessation    counseling on: 2013. Marital Status: Single. Lives w ith: grandmother. Occupation/W ork: employed full time [employer] . Education/School: has highschool diploma. Sabianist: no.     History reviewed.  No pertinent family history. OBJECTIVE:    Visit Vitals  /80   Pulse 91   Temp 97.8 °F (36.6 °C) (Oral)   Resp 20   Ht 5' 7\" (1.702 m)   Wt 286 lb 9.6 oz (130 kg)   SpO2 98%   BMI 44.89 kg/m²     CONSTITUTIONAL: well , well nourished, appears age appropriate  EYES: perrla, eom intact  ENMT:moist mucous membranes, pharynx clear  NECK: supple. Thyroid normal  RESPIRATORY: Chest: clear bilaterally   CARDIOVASCULAR: Heart: regular rate and rhythm  GASTROINTESTINAL: Abdomen: soft, bowel sounds active  HEMATOLOGIC: no pathological lymph nodes palpated  MUSCULOSKELETAL: Extremities: no edema, pulse 1+   INTEGUMENT: No unusual rashes or suspicious skin lesions noted. Nails appear normal.  NEUROLOGIC: non-focal exam   MENTAL STATUS: alert and oriented, appropriate affect           ASSESSMENT:  1. HTN (hypertension), malignant    2. Type 2 diabetes mellitus without complication, without long-term current use of insulin (HCC)    3. Obesity, morbid ([address]    4. Pulmonary hypertension, moderate to severe ([address]    5. Grade II diastolic dysfunction    6. Learning disability    7. SOB (shortness of breath)      Blood pressure control is finally responding. His last blood pressure reading at home was 140/80. This suggests that we are approaching goal-range blood pressure with multiple drugs. His blood sugar control has been felt poor. He had an A1c on 01/29/2020 and was 8. We will check hemoglobin A1c today to determine if we need to further adjust his medications. He has morbid obesity but since we last saw him, he has actually lost 4 pounds, rather concerning but basically in view of the fact that he has 2+ edema peripherally. There is a history of moderate moderate-to-severe pulmonary hypertension for which we will document with an echocardiogram.      He also has a history of grade 2 diastolic dysfunction. I am very concerned about the edema, particularly in the fact that he is taking minoxidil 30 mg twice a day.   We will ask for a stat echo and determine if he needs to have an adjustment or discontinue the minoxidil. Shortness of breath is not a permanent symptom; however, we will check a BNP, to make sure he is not tipping into congestive heart failure. He will come back to see us in 1 week. We increase Lasix to 40 mg b.i.d.  if he does not respond, then we will add Zaroxolyn      I have discussed the diagnosis with the patient and the intended plan as seen in the  orders above. The patient understands and agees with the plan. The patient has   received an after visit summary and questions were answered concerning  future plans  Patient labs and/or xrays were reviewed  Past records were reviewed. PLAN:  .  Orders Placed This Encounter    RENAL FUNCTION PANEL    HEMOGLOBIN A1C WITH EAG    BNP (NT-PRO)    losartan (COZAAR) 100 mg tablet    furosemide (LASIX) 40 mg tablet       Follow-up and Dispositions    · Return in about 1 week (around 5/25/2020). ATTENTION:   This medical record was transcribed using an electronic medical records system. Although proofread, it may and can contain electronic and spelling errors. Other human spelling and other errors may be present. Corrections may be executed at a later time. Please feel free to contact us for any clarifications as needed.

## 2020-05-19 LAB
ALBUMIN SERPL-MCNC: 5 G/DL (ref 4–5)
BUN SERPL-MCNC: 19 MG/DL (ref 6–20)
BUN/CREAT SERPL: 11 (ref 9–20)
CALCIUM SERPL-MCNC: 9.8 MG/DL (ref 8.7–10.2)
CHLORIDE SERPL-SCNC: 97 MMOL/L (ref 96–106)
CO2 SERPL-SCNC: 23 MMOL/L (ref 20–29)
CREAT SERPL-MCNC: 1.71 MG/DL (ref 0.76–1.27)
EST. AVERAGE GLUCOSE BLD GHB EST-MCNC: 151 MG/DL
GLUCOSE SERPL-MCNC: 158 MG/DL (ref 65–99)
HBA1C MFR BLD: 6.9 % (ref 4.8–5.6)
NT-PROBNP SERPL-MCNC: 158 PG/ML (ref 0–86)
PHOSPHATE SERPL-MCNC: 4.1 MG/DL (ref 2.8–4.1)
POTASSIUM SERPL-SCNC: 3.7 MMOL/L (ref 3.5–5.2)
SODIUM SERPL-SCNC: 138 MMOL/L (ref 134–144)

## 2020-05-26 ENCOUNTER — OFFICE VISIT (OUTPATIENT)
Dept: INTERNAL MEDICINE CLINIC | Age: 35
End: 2020-05-26

## 2020-05-26 VITALS
WEIGHT: 291.4 LBS | DIASTOLIC BLOOD PRESSURE: 82 MMHG | HEART RATE: 88 BPM | OXYGEN SATURATION: 98 % | TEMPERATURE: 97.9 F | RESPIRATION RATE: 18 BRPM | BODY MASS INDEX: 45.74 KG/M2 | SYSTOLIC BLOOD PRESSURE: 138 MMHG | HEIGHT: 67 IN

## 2020-05-26 DIAGNOSIS — E11.9 TYPE 2 DIABETES MELLITUS WITHOUT COMPLICATION, WITHOUT LONG-TERM CURRENT USE OF INSULIN (HCC): ICD-10-CM

## 2020-05-26 DIAGNOSIS — I34.0 MILD MITRAL INSUFFICIENCY: ICD-10-CM

## 2020-05-26 DIAGNOSIS — E66.01 OBESITY, MORBID (HCC): ICD-10-CM

## 2020-05-26 DIAGNOSIS — F81.9 LEARNING DISABILITY: ICD-10-CM

## 2020-05-26 DIAGNOSIS — I10 HTN (HYPERTENSION), MALIGNANT: Primary | ICD-10-CM

## 2020-05-26 DIAGNOSIS — I51.89 GRADE II DIASTOLIC DYSFUNCTION: ICD-10-CM

## 2020-05-26 DIAGNOSIS — I27.20 PULMONARY HYPERTENSION, MODERATE TO SEVERE (HCC): ICD-10-CM

## 2020-05-26 NOTE — PROGRESS NOTES
1. Have you been to the ER, urgent care clinic since your last visit? Hospitalized since your last visit? No    2. Have you seen or consulted any other health care providers outside of the [address] [place] [address] since your last visit? Include any pap smears or colon screening.  No

## 2020-05-26 NOTE — PROGRESS NOTES
SPORTS MEDICINE AND PRIMARY CARE  Fernie Lambert MD, [place] [address]  Phone:  [phone]  Fax: [phone]      Chief Complaint   Patient presents with    Hypertension         SUBECTIVE:    Nayely Ordoñez is a 30 y.o. male Patient is seen today with known history of primary hypertension, grade 2 diastolic dysfunction, mitral insufficiency, type 2 diabetes, morbid obesity, pulmonary hypertension and learning disability. He has no new complaints and notes that he is urinating more frequently since starting Lasix. Current Outpatient Medications   Medication Sig Dispense Refill    losartan (COZAAR) 100 mg tablet Take 1 Tab by mouth daily. 90 Tab 3    furosemide (LASIX) 40 mg tablet Take 1 Tab by mouth two (2) times a day. 180 Tab 3    metoprolol succinate (TOPROL-XL) 100 mg tablet Take 2 Tabs by mouth nightly. 90 Tab 3    minoxidiL (LONITEN) 10 mg tablet Take 3 Tabs by mouth two (2) times a day. 540 Tab 3    metFORMIN ER (GLUCOPHAGE XR) 500 mg tablet Take 2 Tabs by mouth Before breakfast and dinner. And 1 with bf 360 Tab 3    cloNIDine HCL (CATAPRES) 0.3 mg tablet Take 1 Tab by mouth nightly. 90 Tab 11    amLODIPine (NORVASC) 10 mg tablet Take 1 Tab by mouth daily. 90 Tab 3    ferrous gluconate 324 mg (37.5 mg iron) tablet Take 1 Tab by mouth every other day. 80 Tab 11     Past Medical History:   Diagnosis Date    Blurred vision, bilateral     DM2 (diabetes mellitus, type 2) ([address] 04/03/2018    Grade II diastolic dysfunction 62/48/9682    HTN (hypertension), malignant     Learning disability 1991    fell hit head and thereafter could not remember according to     Morbid obesity with BMI of 45.0-49.9, adult ([address]     Non-compliance 12/10/2018    Pulmonary hypertension, moderate to severe ([address] 12/10/2018    chip     History reviewed. No pertinent surgical history.   Not on File    REVIEW OF SYSTEMS:   No swelling, no chest pain, no shortness of breath. Social History     Socioeconomic History    Marital status: SINGLE     Spouse name: Not on file    Number of children: Not on file    Years of education: Not on file    Highest education level: Not on file   Tobacco Use    Smoking status: Former Smoker     Last attempt to quit: 2018     Years since quittin.4    Smokeless tobacco: Never Used   Substance and Sexual Activity    Alcohol use: No    Drug use: No    Sexual activity: Not Currently   Social History Narrative    Medical History: KeloidObesity    Surgical History: Denies Past Surgical History    Hospitalization/Major Diagnostic Procedure: Denies Past Hospitalization    Family History: Mother: alive 37 yrs a/w Father: alive 36 yrs a/w Sister(s): alive Brother(s): alive Grandmother: alive 64 yrs Leatha Hu 3 brother(s) , 2 sister(s) . Social History: Alcohol Use Patient does not use alcohol. Smoking Status Patient is a current every day smoker, Received cessation    counseling on: 2013. Marital Status: Single. Lives w ith: grandmother. Occupation/W ork: employed full time [employer] . Education/School: has highschool diploma. Mormon: no.   r  History reviewed. No pertinent family history. OBJECTIVE:  Visit Vitals  /82   Pulse 88   Temp 97.9 °F (36.6 °C) (Oral)   Resp 18   Ht 5' 7\" (1.702 m)   Wt 291 lb 6.4 oz (132.2 kg)   SpO2 98%   BMI 45.64 kg/m²     ENT: perrla,  eom intact  NECK: supple.  Thyroid normal  CHEST: clear to ascultation and percussion   HEART: regular rate and rhythm  ABD: soft, bowel sounds active  EXTREMITIES: no edema, pulse 1+     Hospital Outpatient Visit on 2020   Component Date Value Ref Range Status    MV E Venu 2020 99.57  cm/s Final    Triscuspid Valve Regurgitation Pea* 2020 21.5  mmHg Final    TR Max Velocity 2020 231.98  cm/s Final    LV E' Lateral Velocity 2020 12.82  cm/s Final    LV E' Septal Velocity 2020 11.16 cm/s Final    Tapse 05/18/2020 3.57* 1.5 - 2.0 cm Final    E/E' lateral 05/18/2020 7.77   Final    E/E' septal 05/18/2020 8.92   Final    E/E' ratio (averaged) 05/18/2020 8.34   Final   Office Visit on 05/18/2020   Component Date Value Ref Range Status    Glucose 05/18/2020 158* 65 - 99 mg/dL Final    BUN 05/18/2020 19  6 - 20 mg/dL Final    Creatinine 05/18/2020 1.71* 0.76 - 1.27 mg/dL Final    GFR est non-AA 05/18/2020 52* >59 mL/min/1.73 Final    GFR est AA 05/18/2020 60  >59 mL/min/1.73 Final    BUN/Creatinine ratio 05/18/2020 11  9 - 20 Final    Sodium 05/18/2020 138  134 - 144 mmol/L Final    Potassium 05/18/2020 3.7  3.5 - 5.2 mmol/L Final    Chloride 05/18/2020 97  96 - 106 mmol/L Final    CO2 05/18/2020 23  20 - 29 mmol/L Final    Calcium 05/18/2020 9.8  8.7 - 10.2 mg/dL Final    Phosphorus 05/18/2020 4.1  2.8 - 4.1 mg/dL Final    Albumin 05/18/2020 5.0  4.0 - 5.0 g/dL Final    Hemoglobin A1c 05/18/2020 6.9* 4.8 - 5.6 % Final    Comment:          Prediabetes: 5.7 - 6.4           Diabetes: >6.4           Glycemic control for adults with diabetes: <7.0      Estimated average glucose 05/18/2020 151  mg/dL Final    PROBNP 05/18/2020 158* 0 - 86 pg/mL Final    Comment: The following cut-points have been suggested for the  use of proBNP for the diagnostic evaluation of heart  failure (HF) in patients with acute dyspnea:  Modality                     Age           Optimal Cut                             (years)            Point  ------------------------------------------------------  Diagnosis (rule in HF)        <50            450 pg/mL                            50 - 75            900 pg/mL                                >75           1800 pg/mL  Exclusion (rule out HF)  Age independent     300 pg/mL            ASSESSMENT:  1. HTN (hypertension), malignant    2. Grade II diastolic dysfunction    3. Mild mitral insufficiency    4.  Type 2 diabetes mellitus without complication, without long-term current use of insulin (HCC)    5. Obesity, morbid ([address]    6. Pulmonary hypertension, moderate to severe ([address]    7. Learning disability      Finally, we have an acceptable blood pressure reading at 138/82. We will allow it to stay here, but over the course of the next several months we will try and get it to 120/80 or less. On previous echocardiogram he had grade 2 diastolic dysfunction. His current echocardiogram did not confirm that, but it was a poorly visualized heart. Blood sugar control is improved with a hemoglobin A1c on his last visit of 6.9. We congratulate him. Obesity remains a problem and since we last saw him he has gained about 3 pounds. On this current echocardiogram where the acoustic window was so poor that a comment regarding pulmonary hypertension could not be made. We will check a BMP today and look at his potassium and see if he can tolerate Aldactone, which would be a drug we would like to add for its renal sparing effect, as well as to help blood pressure control. I have discussed the diagnosis with the patient and the intended plan as seen in the  orders above. The patient understands and agees with the plan. The patient has   received an after visit summary and questions were answered concerning  future plans  Patient labs and/or xrays were reviewed  Past records were reviewed. PLAN:  .  Orders Placed This Encounter    RENAL FUNCTION PANEL       Follow-up and Dispositions    · Return in about 4 weeks (around 6/23/2020). ATTENTION:   This medical record was transcribed using an electronic medical records system. Although proofread, it may and can contain electronic and spelling errors. Other human spelling and other errors may be present. Corrections may be executed at a later time. Please feel free to contact us for any clarifications as needed.

## 2020-05-28 LAB
ALBUMIN SERPL-MCNC: 5.1 G/DL (ref 4–5)
BUN SERPL-MCNC: 14 MG/DL (ref 6–20)
BUN/CREAT SERPL: 9 (ref 9–20)
CALCIUM SERPL-MCNC: 9.9 MG/DL (ref 8.7–10.2)
CHLORIDE SERPL-SCNC: 96 MMOL/L (ref 96–106)
CO2 SERPL-SCNC: 23 MMOL/L (ref 20–29)
CREAT SERPL-MCNC: 1.55 MG/DL (ref 0.76–1.27)
GLUCOSE SERPL-MCNC: 115 MG/DL (ref 65–99)
PHOSPHATE SERPL-MCNC: 3.9 MG/DL (ref 2.8–4.1)
POTASSIUM SERPL-SCNC: 4 MMOL/L (ref 3.5–5.2)
SODIUM SERPL-SCNC: 138 MMOL/L (ref 134–144)

## 2020-06-30 ENCOUNTER — OFFICE VISIT (OUTPATIENT)
Dept: INTERNAL MEDICINE CLINIC | Age: 35
End: 2020-06-30

## 2020-06-30 VITALS
DIASTOLIC BLOOD PRESSURE: 100 MMHG | TEMPERATURE: 98.4 F | OXYGEN SATURATION: 98 % | HEART RATE: 82 BPM | RESPIRATION RATE: 20 BRPM | HEIGHT: 67 IN | WEIGHT: 288 LBS | SYSTOLIC BLOOD PRESSURE: 160 MMHG | BODY MASS INDEX: 45.2 KG/M2

## 2020-06-30 DIAGNOSIS — E66.01 OBESITY, MORBID (HCC): ICD-10-CM

## 2020-06-30 DIAGNOSIS — I51.89 GRADE II DIASTOLIC DYSFUNCTION: ICD-10-CM

## 2020-06-30 DIAGNOSIS — F81.9 LEARNING DISABILITY: ICD-10-CM

## 2020-06-30 DIAGNOSIS — I10 HTN (HYPERTENSION), MALIGNANT: ICD-10-CM

## 2020-06-30 DIAGNOSIS — E11.9 TYPE 2 DIABETES MELLITUS WITHOUT COMPLICATION, WITHOUT LONG-TERM CURRENT USE OF INSULIN (HCC): Primary | ICD-10-CM

## 2020-06-30 DIAGNOSIS — I34.0 MILD MITRAL INSUFFICIENCY: ICD-10-CM

## 2020-06-30 RX ORDER — TELMISARTAN 80 MG/1
80 TABLET ORAL DAILY
Qty: 30 TAB | Refills: 11 | Status: SHIPPED | OUTPATIENT
Start: 2020-06-30 | End: 2021-06-30

## 2020-06-30 NOTE — PROGRESS NOTES
SPORTS MEDICINE AND PRIMARY CARE  Colt Casper MD, [address] [facility] [address]  Phone:  [phone]  Fax: [phone]      Chief Complaint   Patient presents with    Hypertension         SUBECTIVE:    Yissel Delgadillo is a 30 y.o. male Patient returns today with known history of diabetes mellitus type 2, learning disability, morbid obesity, grade 2 diastolic dysfunction, mild mitral regurgitation and history of malignant hypertension and is seen for evaluation. He has no new complaints. He is currently unemployed. Current Outpatient Medications   Medication Sig Dispense Refill    telmisartan (MICARDIS) 80 mg tablet Take 1 Tab by mouth daily. 30 Tab 11    furosemide (LASIX) 40 mg tablet Take 1 Tab by mouth two (2) times a day. 180 Tab 3    metoprolol succinate (TOPROL-XL) 100 mg tablet Take 2 Tabs by mouth nightly. 90 Tab 3    minoxidiL (LONITEN) 10 mg tablet Take 3 Tabs by mouth two (2) times a day. 540 Tab 3    metFORMIN ER (GLUCOPHAGE XR) 500 mg tablet Take 2 Tabs by mouth Before breakfast and dinner. And 1 with bf 360 Tab 3    cloNIDine HCL (CATAPRES) 0.3 mg tablet Take 1 Tab by mouth nightly. 90 Tab 11    amLODIPine (NORVASC) 10 mg tablet Take 1 Tab by mouth daily. 90 Tab 3    ferrous gluconate 324 mg (37.5 mg iron) tablet Take 1 Tab by mouth every other day. 80 Tab 11     Past Medical History:   Diagnosis Date    Blurred vision, bilateral     DM2 (diabetes mellitus, type 2) [facility] 04/03/2018    Grade II diastolic dysfunction 32/13/1100    HTN (hypertension), malignant     Learning disability 1991    fell hit head and thereafter could not remember according to     Morbid obesity with BMI of 45.0-49.9, adult ([facility]     Non-compliance 12/10/2018    Pulmonary hypertension, moderate to severe ([facility] 12/10/2018    chip     History reviewed. No pertinent surgical history. Not on File    REVIEW OF SYSTEMS:   No chest pain, no ankle swelling.         Social History Socioeconomic History    Marital status: SINGLE     Spouse name: Not on file    Number of children: Not on file    Years of education: Not on file    Highest education level: Not on file   Tobacco Use    Smoking status: Former Smoker     Last attempt to quit: 2018     Years since quittin.5    Smokeless tobacco: Never Used   Substance and Sexual Activity    Alcohol use: No    Drug use: No    Sexual activity: Not Currently   Social History Narrative    Medical History: KeloidObesity    Surgical History: Denies Past Surgical History    Hospitalization/Major Diagnostic Procedure: Denies Past Hospitalization    Family History: Mother: alive 37 yrs a/w Father: alive 36 yrs a/w Sister(s): alive Brother(s): alive Grandmother: alive 64 yrs Kameron Dominguez 3 brother(s) , 2 sister(s) . Social History: Alcohol Use Patient does not use alcohol. Smoking Status Patient is a current every day smoker, Received cessation    counseling on: 2013. Marital Status: Single. Lives w ith: grandmother. Occupation/W ork: employed full time [employer] . Education/School: has highschool diploma. Denominational: no.   r  History reviewed. No pertinent family history. OBJECTIVE:  Visit Vitals  BP (!) 160/100   Pulse 82   Temp 98.4 °F (36.9 °C) (Oral)   Resp 20   Ht 5' 7\" (1.702 m)   Wt 288 lb (130.6 kg)   SpO2 98%   BMI 45.11 kg/m²     ENT: perrla,  eom intact  NECK: supple.  Thyroid normal  CHEST: clear to ascultation and percussion   HEART: regular rate and rhythm  ABD: soft, bowel sounds active  EXTREMITIES: no edema, pulse 1+     Office Visit on 2020   Component Date Value Ref Range Status    Glucose 2020 115* 65 - 99 mg/dL Final    BUN 2020 14  6 - 20 mg/dL Final    Creatinine 2020 1.55* 0.76 - 1.27 mg/dL Final    GFR est non-AA 2020 58* >59 mL/min/1.73 Final    GFR est AA 2020 67  >59 mL/min/1.73 Final    BUN/Creatinine ratio 2020 9  9 - 20 Final    Sodium 05/26/2020 138  134 - 144 mmol/L Final    Potassium 05/26/2020 4.0  3.5 - 5.2 mmol/L Final    Chloride 05/26/2020 96  96 - 106 mmol/L Final    CO2 05/26/2020 23  20 - 29 mmol/L Final    Calcium 05/26/2020 9.9  8.7 - 10.2 mg/dL Final    Phosphorus 05/26/2020 3.9  2.8 - 4.1 mg/dL Final    Albumin 05/26/2020 5.1* 4.0 - 5.0 g/dL Final   Hospital Outpatient Visit on 05/18/2020   Component Date Value Ref Range Status    MV E Venu 05/18/2020 99.57  cm/s Final    Triscuspid Valve Regurgitation Pea* 05/18/2020 21.5  mmHg Final    TR Max Velocity 05/18/2020 231.98  cm/s Final    LV E' Lateral Velocity 05/18/2020 12.82  cm/s Final    LV E' Septal Velocity 05/18/2020 11.16  cm/s Final    Tapse 05/18/2020 3.57* 1.5 - 2.0 cm Final    E/E' lateral 05/18/2020 7.77   Final    E/E' septal 05/18/2020 8.92   Final    E/E' ratio (averaged) 05/18/2020 8.34   Final   Office Visit on 05/18/2020   Component Date Value Ref Range Status    Glucose 05/18/2020 158* 65 - 99 mg/dL Final    BUN 05/18/2020 19  6 - 20 mg/dL Final    Creatinine 05/18/2020 1.71* 0.76 - 1.27 mg/dL Final    GFR est non-AA 05/18/2020 52* >59 mL/min/1.73 Final    GFR est AA 05/18/2020 60  >59 mL/min/1.73 Final    BUN/Creatinine ratio 05/18/2020 11  9 - 20 Final    Sodium 05/18/2020 138  134 - 144 mmol/L Final    Potassium 05/18/2020 3.7  3.5 - 5.2 mmol/L Final    Chloride 05/18/2020 97  96 - 106 mmol/L Final    CO2 05/18/2020 23  20 - 29 mmol/L Final    Calcium 05/18/2020 9.8  8.7 - 10.2 mg/dL Final    Phosphorus 05/18/2020 4.1  2.8 - 4.1 mg/dL Final    Albumin 05/18/2020 5.0  4.0 - 5.0 g/dL Final    Hemoglobin A1c 05/18/2020 6.9* 4.8 - 5.6 % Final    Comment:          Prediabetes: 5.7 - 6.4           Diabetes: >6.4           Glycemic control for adults with diabetes: <7.0      Estimated average glucose 05/18/2020 151  mg/dL Final    PROBNP 05/18/2020 158* 0 - 86 pg/mL Final    Comment: The following cut-points have been suggested for the  use of proBNP for the diagnostic evaluation of heart  failure (HF) in patients with acute dyspnea:  Modality                     Age           Optimal Cut                             (years)            Point  ------------------------------------------------------  Diagnosis (rule in HF)        <50            450 pg/mL                            50 - 75            900 pg/mL                                >75           1800 pg/mL  Exclusion (rule out HF)  Age independent     300 pg/mL            ASSESSMENT:  1. Type 2 diabetes mellitus without complication, without long-term current use of insulin ([address]    2. Learning disability    3. Obesity, morbid ([address]    4. Grade II diastolic dysfunction    5. Mild mitral insufficiency    6. HTN (hypertension), malignant      Blood sugar was recently assessed with hemoglobin A1c on May 18th at 6.9, which is acceptable. He has a learning disability, which curtails the type of jobs he can get. Currently he is laid off and should be getting unemployment benefits. He has a history of diastolic dysfunction, but denies shortness of breath or ankle swelling. The murmur of mitral regurgitation is noted. Blood pressure is a little higher than we would like to see it. I think with the addition of the ARB it will come down in a normotensive range. He will return to see us in two months. I have discussed the diagnosis with the patient and the intended plan as seen in the  orders above. The patient understands and agees with the plan. The patient has   received an after visit summary and questions were answered concerning  future plans  Patient labs and/or xrays were reviewed  Past records were reviewed. PLAN:  .  Orders Placed This Encounter    RENAL FUNCTION PANEL    telmisartan (MICARDIS) 80 mg tablet       Follow-up and Dispositions    · Return in about 2 months (around 8/30/2020).                    ATTENTION:   This medical record was transcribed using an electronic medical records system. Although proofread, it may and can contain electronic and spelling errors. Other human spelling and other errors may be present. Corrections may be executed at a later time. Please feel free to contact us for any clarifications as needed.

## 2020-06-30 NOTE — PROGRESS NOTES
1. Have you been to the ER, urgent care clinic since your last visit? Hospitalized since your last visit? No    2. Have you seen or consulted any other health care providers outside of the [address] [place] [address] since your last visit? Include any pap smears or colon screening.  No

## 2020-07-02 LAB
ALBUMIN SERPL-MCNC: 5 G/DL (ref 4–5)
BUN SERPL-MCNC: 15 MG/DL (ref 6–20)
BUN/CREAT SERPL: 9 (ref 9–20)
CALCIUM SERPL-MCNC: 9.8 MG/DL (ref 8.7–10.2)
CHLORIDE SERPL-SCNC: 97 MMOL/L (ref 96–106)
CO2 SERPL-SCNC: 23 MMOL/L (ref 20–29)
CREAT SERPL-MCNC: 1.6 MG/DL (ref 0.76–1.27)
GLUCOSE SERPL-MCNC: 121 MG/DL (ref 65–99)
PHOSPHATE SERPL-MCNC: 4.1 MG/DL (ref 2.8–4.1)
POTASSIUM SERPL-SCNC: 4.4 MMOL/L (ref 3.5–5.2)
SODIUM SERPL-SCNC: 141 MMOL/L (ref 134–144)

## 2020-09-08 ENCOUNTER — OFFICE VISIT (OUTPATIENT)
Dept: INTERNAL MEDICINE CLINIC | Age: 35
End: 2020-09-08
Payer: MEDICARE

## 2020-09-08 VITALS
HEART RATE: 95 BPM | OXYGEN SATURATION: 98 % | SYSTOLIC BLOOD PRESSURE: 159 MMHG | DIASTOLIC BLOOD PRESSURE: 94 MMHG | BODY MASS INDEX: 44.57 KG/M2 | HEIGHT: 67 IN | WEIGHT: 284 LBS | RESPIRATION RATE: 18 BRPM | TEMPERATURE: 98.5 F

## 2020-09-08 DIAGNOSIS — F81.9 LEARNING DISABILITY: ICD-10-CM

## 2020-09-08 DIAGNOSIS — E55.9 VITAMIN D DEFICIENCY, UNSPECIFIED: ICD-10-CM

## 2020-09-08 DIAGNOSIS — I10 HTN (HYPERTENSION), MALIGNANT: Primary | ICD-10-CM

## 2020-09-08 DIAGNOSIS — Z23 ENCOUNTER FOR IMMUNIZATION: ICD-10-CM

## 2020-09-08 DIAGNOSIS — E11.9 TYPE 2 DIABETES MELLITUS WITHOUT COMPLICATION, WITHOUT LONG-TERM CURRENT USE OF INSULIN (HCC): ICD-10-CM

## 2020-09-08 DIAGNOSIS — E66.01 OBESITY, MORBID (HCC): ICD-10-CM

## 2020-09-08 DIAGNOSIS — I34.0 MILD MITRAL INSUFFICIENCY: ICD-10-CM

## 2020-09-08 DIAGNOSIS — I51.89 GRADE II DIASTOLIC DYSFUNCTION: ICD-10-CM

## 2020-09-08 DIAGNOSIS — I27.20 PULMONARY HYPERTENSION, MODERATE TO SEVERE (HCC): ICD-10-CM

## 2020-09-08 PROCEDURE — 2022F DILAT RTA XM EVC RTNOPTHY: CPT

## 2020-09-08 PROCEDURE — G8755 DIAS BP > OR = 90: HCPCS

## 2020-09-08 PROCEDURE — G8427 DOCREV CUR MEDS BY ELIG CLIN: HCPCS

## 2020-09-08 PROCEDURE — G8432 DEP SCR NOT DOC, RNG: HCPCS

## 2020-09-08 PROCEDURE — 3044F HG A1C LEVEL LT 7.0%: CPT

## 2020-09-08 PROCEDURE — 99214 OFFICE O/P EST MOD 30 MIN: CPT

## 2020-09-08 PROCEDURE — G8417 CALC BMI ABV UP PARAM F/U: HCPCS

## 2020-09-08 PROCEDURE — 90686 IIV4 VACC NO PRSV 0.5 ML IM: CPT

## 2020-09-08 PROCEDURE — G0008 ADMIN INFLUENZA VIRUS VAC: HCPCS

## 2020-09-08 PROCEDURE — G8753 SYS BP > OR = 140: HCPCS

## 2020-09-08 PROCEDURE — 36415 COLL VENOUS BLD VENIPUNCTURE: CPT

## 2020-09-08 RX ORDER — SPIRONOLACTONE 50 MG/1
50 TABLET, FILM COATED ORAL DAILY
Qty: 30 TAB | Refills: 5 | Status: SHIPPED | OUTPATIENT
Start: 2020-09-08 | End: 2021-03-08 | Stop reason: SDUPTHER

## 2020-09-08 NOTE — PROGRESS NOTES
Chief Complaint   Patient presents with    Hypertension     1. Have you been to the ER, urgent care clinic since your last visit? Hospitalized since your last visit? No    2. Have you seen or consulted any other health care providers outside of the [address] [place] [address] since your last visit? Include any pap smears or colon screening.  No

## 2020-09-08 NOTE — PROGRESS NOTES
SPORTS MEDICINE AND PRIMARY CARE  Shailesh Gu MD, [address] [address]  Phone:  [phone]  Fax: [phone]       Chief Complaint   Patient presents with    Hypertension   . SUBJECTIVE:    Yumiko Callejas is a 30 y.o. male The patient returns today with a known history of learning disability, type 2 diabetes mellitus, morbid obesity, pulmonary hypertension, grade 2 diastolic dysfunction, mild mitral regurgitation, primary hypertension and is seen for evaluation. We saw his grandmother earlier, Cain Addison. Current Outpatient Medications   Medication Sig Dispense Refill    spironolactone (ALDACTONE) 50 mg tablet Take 1 Tab by mouth daily. 30 Tab 5    telmisartan (MICARDIS) 80 mg tablet Take 1 Tab by mouth daily. 30 Tab 11    furosemide (LASIX) 40 mg tablet Take 1 Tab by mouth two (2) times a day. 180 Tab 3    metoprolol succinate (TOPROL-XL) 100 mg tablet Take 2 Tabs by mouth nightly. 90 Tab 3    minoxidiL (LONITEN) 10 mg tablet Take 3 Tabs by mouth two (2) times a day. 540 Tab 3    metFORMIN ER (GLUCOPHAGE XR) 500 mg tablet Take 2 Tabs by mouth Before breakfast and dinner. And 1 with bf 360 Tab 3    cloNIDine HCL (CATAPRES) 0.3 mg tablet Take 1 Tab by mouth nightly. 90 Tab 11    amLODIPine (NORVASC) 10 mg tablet Take 1 Tab by mouth daily. 90 Tab 3    ferrous gluconate 324 mg (37.5 mg iron) tablet Take 1 Tab by mouth every other day. 80 Tab 11     Past Medical History:   Diagnosis Date    Blurred vision, bilateral     DM2 (diabetes mellitus, type 2) ([address] 04/03/2018    Grade II diastolic dysfunction 61/17/7193    HTN (hypertension), malignant     Learning disability 1991    fell hit head and thereafter could not remember according to     Morbid obesity with BMI of 45.0-49.9, adult ([address]     Non-compliance 12/10/2018    Pulmonary hypertension, moderate to severe ([address] 12/10/2018    chip     History reviewed.  No pertinent surgical history. Not on File      REVIEW OF SYSTEMS:  General: negative for - chills or fever  ENT: negative for - headaches, nasal congestion or tinnitus  Respiratory: negative for - cough, hemoptysis, shortness of breath or wheezing  Cardiovascular : negative for - chest pain, edema, palpitations or shortness of breath  Gastrointestinal: negative for - abdominal pain, blood in stools, heartburn or nausea/vomiting  Genito-Urinary: no dysuria, trouble voiding, or hematuria  Musculoskeletal: negative for - gait disturbance, joint pain, joint stiffness or joint swelling  Neurological: no TIA or stroke symptoms  Hematologic: no bruises, no bleeding, no swollen glands  Integument: no lumps, mole changes, nail changes or rash  Endocrine: no malaise/lethargy or unexpected weight changes      Social History     Socioeconomic History    Marital status: SINGLE     Spouse name: Not on file    Number of children: Not on file    Years of education: Not on file    Highest education level: Not on file   Tobacco Use    Smoking status: Former Smoker     Last attempt to quit: 2018     Years since quittin.7    Smokeless tobacco: Never Used   Substance and Sexual Activity    Alcohol use: No    Drug use: No    Sexual activity: Not Currently   Social History Narrative    Medical History: KeloidObesity    Surgical History: Denies Past Surgical History    Hospitalization/Major Diagnostic Procedure: Denies Past Hospitalization    Family History: Mother: alive 37 yrs a/w Father: alive 36 yrs a/w Sister(s): alive Brother(s): alive Grandmother: alive 64 yrs Ade Duran 3 brother(s) , 2 sister(s) . Social History: Alcohol Use Patient does not use alcohol. Smoking Status Patient is a current every day smoker, Received cessation    counseling on: 2013. Marital Status: Single. Lives w ith: grandmother. Occupation/W ork: employed full time [employer] . Education/School: has highschool diploma. Restorationist: no.     History reviewed. No pertinent family history. OBJECTIVE:    Visit Vitals  BP (!) 159/94   Pulse 95   Temp 98.5 °F (36.9 °C) (Oral)   Resp 18   Ht 5' 7\" (1.702 m)   Wt 284 lb (128.8 kg)   SpO2 98%   BMI 44.48 kg/m²     CONSTITUTIONAL: well , well nourished, appears age appropriate  EYES: perrla, eom intact  ENMT:moist mucous membranes, pharynx clear  NECK: supple. Thyroid normal  RESPIRATORY: Chest: clear bilaterally   CARDIOVASCULAR: Heart: regular rate and rhythm  GASTROINTESTINAL: Abdomen: soft, bowel sounds active  HEMATOLOGIC: no pathological lymph nodes palpated  MUSCULOSKELETAL: Extremities: no edema, pulse 1+   INTEGUMENT: No unusual rashes or suspicious skin lesions noted. Nails appear normal.  NEUROLOGIC: non-focal exam   MENTAL STATUS: alert and oriented, appropriate affect           ASSESSMENT:  1. HTN (hypertension), malignant    2. Type 2 diabetes mellitus without complication, without long-term current use of insulin ([address]    3. Mild mitral insufficiency    4. Grade II diastolic dysfunction    5. Pulmonary hypertension, moderate to severe (HCC)    6. Obesity, morbid ([address]    7. Learning disability    8. Vitamin D deficiency, unspecified     9. Encounter for immunization      Blood pressure control remains lacking. His blood pressure at home is usually in the 140s. We will add aldactone to see if we can inch it down into the 130s. I prefer to do that rather than increasing Minoxidil further although we still have some leeway to do that. We will assess blood sugars with a hemoglobin A1C. He has a history of type 2 diastolic dysfunction and mild mitral regurgitation, fortunately no symptoms. No shortness of breath and no swelling.   I remain concerned about his pulmonary hypertension which is moderate to severe and will consider referral to a specialist.  He has morbid obesity for which we encourage a heart-healthy, diabetic weight-reducing diet and physical activity 30 minutes five days a week. He will return to the office in two weeks. I have discussed the diagnosis with the patient and the intended plan as seen in the  orders above. The patient understands and agees with the plan. The patient has   received an after visit summary and questions were answered concerning  future plans  Patient labs and/or xrays were reviewed  Past records were reviewed. PLAN:  .  Orders Placed This Encounter    CT IMMUNIZ ADMIN,1 SINGLE/COMB VAC/TOXOID    Influenza virus vaccine (QUADRIVALENT PRES FREE SYRINGE) IM ([id number])    RENAL FUNCTION PANEL    HEMOGLOBIN A1C WITH EAG    VITAMIN D, 25 HYDROXY    spironolactone (ALDACTONE) 50 mg tablet       Follow-up and Dispositions    · Return in about 2 weeks (around 9/22/2020). ATTENTION:   This medical record was transcribed using an electronic medical records system. Although proofread, it may and can contain electronic and spelling errors. Other human spelling and other errors may be present. Corrections may be executed at a later time. Please feel free to contact us for any clarifications as needed.

## 2020-09-10 LAB
25(OH)D3+25(OH)D2 SERPL-MCNC: 17.5 NG/ML (ref 30–100)
ALBUMIN SERPL-MCNC: 4.7 G/DL (ref 4–5)
BUN SERPL-MCNC: 16 MG/DL (ref 6–20)
BUN/CREAT SERPL: 11 (ref 9–20)
CALCIUM SERPL-MCNC: 10 MG/DL (ref 8.7–10.2)
CHLORIDE SERPL-SCNC: 101 MMOL/L (ref 96–106)
CO2 SERPL-SCNC: 26 MMOL/L (ref 20–29)
CREAT SERPL-MCNC: 1.49 MG/DL (ref 0.76–1.27)
EST. AVERAGE GLUCOSE BLD GHB EST-MCNC: 148 MG/DL
GLUCOSE SERPL-MCNC: 115 MG/DL (ref 65–99)
HBA1C MFR BLD: 6.8 % (ref 4.8–5.6)
PHOSPHATE SERPL-MCNC: 4.8 MG/DL (ref 2.8–4.1)
POTASSIUM SERPL-SCNC: 4.4 MMOL/L (ref 3.5–5.2)
SODIUM SERPL-SCNC: 142 MMOL/L (ref 134–144)

## 2020-09-22 ENCOUNTER — OFFICE VISIT (OUTPATIENT)
Dept: INTERNAL MEDICINE CLINIC | Age: 35
End: 2020-09-22
Payer: MEDICARE

## 2020-09-22 VITALS
TEMPERATURE: 98.6 F | BODY MASS INDEX: 43.82 KG/M2 | DIASTOLIC BLOOD PRESSURE: 83 MMHG | HEART RATE: 95 BPM | SYSTOLIC BLOOD PRESSURE: 158 MMHG | OXYGEN SATURATION: 98 % | RESPIRATION RATE: 18 BRPM | HEIGHT: 67 IN | WEIGHT: 279.2 LBS

## 2020-09-22 DIAGNOSIS — I10 HTN (HYPERTENSION), MALIGNANT: ICD-10-CM

## 2020-09-22 DIAGNOSIS — E66.01 OBESITY, MORBID (HCC): ICD-10-CM

## 2020-09-22 DIAGNOSIS — I51.89 GRADE II DIASTOLIC DYSFUNCTION: ICD-10-CM

## 2020-09-22 DIAGNOSIS — F81.9 LEARNING DISABILITY: ICD-10-CM

## 2020-09-22 DIAGNOSIS — I27.20 PULMONARY HYPERTENSION, MODERATE TO SEVERE (HCC): ICD-10-CM

## 2020-09-22 DIAGNOSIS — E11.9 TYPE 2 DIABETES MELLITUS WITHOUT COMPLICATION, WITHOUT LONG-TERM CURRENT USE OF INSULIN (HCC): Primary | ICD-10-CM

## 2020-09-22 PROCEDURE — 2022F DILAT RTA XM EVC RTNOPTHY: CPT | Performed by: INTERNAL MEDICINE

## 2020-09-22 PROCEDURE — 99213 OFFICE O/P EST LOW 20 MIN: CPT | Performed by: INTERNAL MEDICINE

## 2020-09-22 PROCEDURE — 3044F HG A1C LEVEL LT 7.0%: CPT | Performed by: INTERNAL MEDICINE

## 2020-09-22 PROCEDURE — G8417 CALC BMI ABV UP PARAM F/U: HCPCS | Performed by: INTERNAL MEDICINE

## 2020-09-22 PROCEDURE — G8754 DIAS BP LESS 90: HCPCS | Performed by: INTERNAL MEDICINE

## 2020-09-22 PROCEDURE — G8432 DEP SCR NOT DOC, RNG: HCPCS | Performed by: INTERNAL MEDICINE

## 2020-09-22 PROCEDURE — G8427 DOCREV CUR MEDS BY ELIG CLIN: HCPCS | Performed by: INTERNAL MEDICINE

## 2020-09-22 PROCEDURE — G8753 SYS BP > OR = 140: HCPCS | Performed by: INTERNAL MEDICINE

## 2020-09-22 RX ORDER — ISOSORBIDE MONONITRATE 30 MG/1
30 TABLET, EXTENDED RELEASE ORAL
Qty: 30 TAB | Refills: 11 | Status: SHIPPED | OUTPATIENT
Start: 2020-09-22 | End: 2021-12-29

## 2020-09-22 NOTE — PROGRESS NOTES
SPORTS MEDICINE AND PRIMARY CARE  Uriel Matthew MD, [address] [address]  Phone:  [phone]  Fax: [phone]      Chief Complaint   Patient presents with    Hypertension         SUBECTIVE:    Parminder Najera is a 30 y.o. male Patient returns today without new complaints. He was playing basketball and has not played for years and feels a little stiff today. He has no chest pain, no shortness of breath, no swelling. He is taking his medications regularly, he states. His blood pressure is about what it is today, usually in the 140s. Patient is seen for evaluation. He has a known history of resistant primary hypertension, type 2 diabetes, moderate pulmonary hypertension that could not be confirmed with the last echocardiogram, morbid obesity, learning disability, grade 2 diastolic dysfunction, and is seen for evaluation. Current Outpatient Medications   Medication Sig Dispense Refill    isosorbide mononitrate ER (IMDUR) 30 mg tablet Take 1 Tab by mouth every morning. 30 Tab 11    spironolactone (ALDACTONE) 50 mg tablet Take 1 Tab by mouth daily. 30 Tab 5    telmisartan (MICARDIS) 80 mg tablet Take 1 Tab by mouth daily. 30 Tab 11    furosemide (LASIX) 40 mg tablet Take 1 Tab by mouth two (2) times a day. 180 Tab 3    metoprolol succinate (TOPROL-XL) 100 mg tablet Take 2 Tabs by mouth nightly. 90 Tab 3    minoxidiL (LONITEN) 10 mg tablet Take 3 Tabs by mouth two (2) times a day. 540 Tab 3    metFORMIN ER (GLUCOPHAGE XR) 500 mg tablet Take 2 Tabs by mouth Before breakfast and dinner. And 1 with bf 360 Tab 3    cloNIDine HCL (CATAPRES) 0.3 mg tablet Take 1 Tab by mouth nightly. 90 Tab 11    amLODIPine (NORVASC) 10 mg tablet Take 1 Tab by mouth daily. 90 Tab 3    ferrous gluconate 324 mg (37.5 mg iron) tablet Take 1 Tab by mouth every other day.  80 Tab 11     Past Medical History:   Diagnosis Date    Blurred vision, bilateral     DM2 [facility]di[facility] 2) [facility] 2018    Grade II diastolic dysfunction     HTN (hypertension), malignant     Learning disability     fell hit head and thereafter could not remember according to     Morbid obesity with BMI of 45.0-49.9, adult [facility]     Non-compliance 12/10/2018    Pulmonary hypertension, moderate to severe ([facility] 12/10/2018    chip     No past surgical history on file. Not on File    REVIEW OF SYSTEMS:   No chest pain, no shortness of breath. Social History     Socioeconomic History    Marital status: SINGLE     Spouse name: Not on file    Number of children: Not on file    Years of education: Not on file    Highest education level: Not on file   Tobacco Use    Smoking status: Former Smoker     Last attempt to quit: 2018     Years since quittin.7    Smokeless tobacco: Never Used   Substance and Sexual Activity    Alcohol use: No    Drug use: No    Sexual activity: Not Currently   Social History Narrative    Medical History: KeloidObesity    Surgical History: Denies Past Surgical History    Hospitalization/Major Diagnostic Procedure: Denies Past Hospitalization    Family History: Mother: alive 37 yrs a/w Father: alive 36 yrs a/w Sister(s): alive Brother(s): alive Grandmother: alive 64 yrs Simone Murdock 3 brother(s) , 2 sister(s) . Social History: Alcohol Use Patient does not use alcohol. Smoking Status Patient is a current every day smoker, Received cessation    counseling on: 2013. Marital Status: Single. Lives w ith: grandmother. Occupation/W ork: employed full time [employer] . Education/School: has highschool diploma. Latter-day: no.   r  No family history on file. OBJECTIVE:  Visit Vitals  BP (!) 158/83   Pulse 95   Temp 98.6 °F (37 °C) (Oral)   Resp 18   Ht 5' 7\" (1.702 m)   Wt 279 lb 3.2 oz (126.6 kg)   SpO2 98%   BMI 43.73 kg/m²     ENT: perrla,  eom intact  NECK: supple.  Thyroid normal  CHEST: clear to ascultation and percussion   HEART: regular rate and rhythm  ABD: soft, bowel sounds active  EXTREMITIES: no edema, pulse 1+     Office Visit on 09/08/2020   Component Date Value Ref Range Status    Glucose 09/08/2020 115* 65 - 99 mg/dL Final    BUN 09/08/2020 16  6 - 20 mg/dL Final    Creatinine 09/08/2020 1.49* 0.76 - 1.27 mg/dL Final    GFR est non-AA 09/08/2020 61  >59 mL/min/1.73 Final    GFR est AA 09/08/2020 71  >59 mL/min/1.73 Final    BUN/Creatinine ratio 09/08/2020 11  9 - 20 Final    Sodium 09/08/2020 142  134 - 144 mmol/L Final    Potassium 09/08/2020 4.4  3.5 - 5.2 mmol/L Final    Chloride 09/08/2020 101  96 - 106 mmol/L Final    CO2 09/08/2020 26  20 - 29 mmol/L Final    Calcium 09/08/2020 10.0  8.7 - 10.2 mg/dL Final    Phosphorus 09/08/2020 4.8* 2.8 - 4.1 mg/dL Final    Albumin 09/08/2020 4.7  4.0 - 5.0 g/dL Final    Hemoglobin A1c 09/08/2020 6.8* 4.8 - 5.6 % Final    Comment:          Prediabetes: 5.7 - 6.4           Diabetes: >6.4           Glycemic control for adults with diabetes: <7.0      Estimated average glucose 09/08/2020 148  mg/dL Final    VITAMIN D, 25-HYDROXY 09/08/2020 17.5* 30.0 - 100.0 ng/mL Final    Comment: Vitamin D deficiency has been defined by the [place] of  Medicine and an Endocrine Society practice guideline as a  level of serum 25-OH vitamin D less than 20 ng/mL (1,2). The Endocrine Society went on to further define vitamin D  insufficiency as a level between 21 and 29 ng/mL (2). 1. IOM ([place] of Medicine). 2010. Dietary reference     intakes for calcium and D. [address] [facility]: The     [place]. 2. Yulissa MF, Praveena WILL, Charlotte PHELPS, et al.     Evaluation, treatment, and prevention of vitamin D     deficiency: an Endocrine Society clinical practice     guideline. JCEM. 2011 Jul; 96(7):1911-30.      Office Visit on 06/30/2020   Component Date Value Ref Range Status    Glucose 06/30/2020 121* 65 - 99 mg/dL Final    BUN 06/30/2020 15  6 - 20 mg/dL Final    Creatinine 06/30/2020 1.60* 0.76 - 1.27 mg/dL Final    GFR est non-AA 06/30/2020 56* >59 mL/min/1.73 Final    GFR est AA 06/30/2020 65  >59 mL/min/1.73 Final    BUN/Creatinine ratio 06/30/2020 9  9 - 20 Final    Sodium 06/30/2020 141  134 - 144 mmol/L Final    Potassium 06/30/2020 4.4  3.5 - 5.2 mmol/L Final    Chloride 06/30/2020 97  96 - 106 mmol/L Final    CO2 06/30/2020 23  20 - 29 mmol/L Final    Calcium 06/30/2020 9.8  8.7 - 10.2 mg/dL Final    Phosphorus 06/30/2020 4.1  2.8 - 4.1 mg/dL Final    Albumin 06/30/2020 5.0  4.0 - 5.0 g/dL Final          ASSESSMENT:  1. Type 2 diabetes mellitus without complication, without long-term current use of insulin (HCC)    2. Pulmonary hypertension, moderate to severe (HCC)    3. Obesity, morbid ([address]    4. Learning disability    5. Grade II diastolic dysfunction    6. HTN (hypertension), malignant      Blood sugar control was assessed earlier this month and noted to be 6.8, which is excellent. He had one echocardiogram in view of pulmonary hypertension. The second echocardiogram was of poor quality due to I suppose the acoustic window, and could not confirm or deny the diagnosis. He is asymptomatic. We encourage a heart healthy, weight reducing diet. There is a history of grade 2 diastolic dysfunction. We have been cautiously using Minoxidil and diuretic and he has had no evidence of cardiac decompensation. We will add Imdur for blood pressure control and titrate that up to the maximum dose if we do not get his blood pressure less than 737/47, certainly I would like to see it in the 130s/80. I have discussed the diagnosis with the patient and the intended plan as seen in the  orders above. The patient understands and agees with the plan.   The patient has   received an after visit summary and questions were answered concerning  future plans  Patient labs and/or xrays were reviewed  Past records were reviewed. PLAN:  .  Orders Placed This Encounter    isosorbide mononitrate ER (IMDUR) 30 mg tablet       Follow-up and Dispositions    · Return in about 6 weeks (around 11/3/2020). ATTENTION:   This medical record was transcribed using an electronic medical records system. Although proofread, it may and can contain electronic and spelling errors. Other human spelling and other errors may be present. Corrections may be executed at a later time. Please feel free to contact us for any clarifications as needed.

## 2020-09-22 NOTE — PROGRESS NOTES
1. Have you been to the ER, urgent care clinic since your last visit? Hospitalized since your last visit? No    2. Have you seen or consulted any other health care providers outside of the [address] [place] [address] since your last visit? Include any pap smears or colon screening.  No

## 2020-10-13 RX ORDER — METOPROLOL SUCCINATE 100 MG/1
200 TABLET, EXTENDED RELEASE ORAL
Qty: 90 TAB | Refills: 3 | Status: SHIPPED | OUTPATIENT
Start: 2020-10-13 | End: 2021-06-07

## 2020-11-03 ENCOUNTER — OFFICE VISIT (OUTPATIENT)
Dept: INTERNAL MEDICINE CLINIC | Age: 35
End: 2020-11-03
Payer: MEDICARE

## 2020-11-03 VITALS
BODY MASS INDEX: 44.28 KG/M2 | HEART RATE: 112 BPM | WEIGHT: 282.1 LBS | TEMPERATURE: 98.2 F | DIASTOLIC BLOOD PRESSURE: 71 MMHG | SYSTOLIC BLOOD PRESSURE: 158 MMHG | HEIGHT: 67 IN | RESPIRATION RATE: 20 BRPM | OXYGEN SATURATION: 97 %

## 2020-11-03 DIAGNOSIS — E66.01 OBESITY, MORBID (HCC): ICD-10-CM

## 2020-11-03 DIAGNOSIS — R06.02 SOB (SHORTNESS OF BREATH): ICD-10-CM

## 2020-11-03 DIAGNOSIS — I27.20 PULMONARY HYPERTENSION, MODERATE TO SEVERE (HCC): ICD-10-CM

## 2020-11-03 DIAGNOSIS — E11.9 TYPE 2 DIABETES MELLITUS WITHOUT COMPLICATION, WITHOUT LONG-TERM CURRENT USE OF INSULIN (HCC): ICD-10-CM

## 2020-11-03 DIAGNOSIS — I10 HTN (HYPERTENSION), MALIGNANT: Primary | ICD-10-CM

## 2020-11-03 DIAGNOSIS — I51.89 GRADE II DIASTOLIC DYSFUNCTION: ICD-10-CM

## 2020-11-03 PROCEDURE — 99213 OFFICE O/P EST LOW 20 MIN: CPT | Performed by: INTERNAL MEDICINE

## 2020-11-03 PROCEDURE — G8753 SYS BP > OR = 140: HCPCS | Performed by: INTERNAL MEDICINE

## 2020-11-03 PROCEDURE — G8754 DIAS BP LESS 90: HCPCS | Performed by: INTERNAL MEDICINE

## 2020-11-03 PROCEDURE — G8427 DOCREV CUR MEDS BY ELIG CLIN: HCPCS | Performed by: INTERNAL MEDICINE

## 2020-11-03 PROCEDURE — 3044F HG A1C LEVEL LT 7.0%: CPT | Performed by: INTERNAL MEDICINE

## 2020-11-03 PROCEDURE — G8432 DEP SCR NOT DOC, RNG: HCPCS | Performed by: INTERNAL MEDICINE

## 2020-11-03 PROCEDURE — 36415 COLL VENOUS BLD VENIPUNCTURE: CPT | Performed by: INTERNAL MEDICINE

## 2020-11-03 PROCEDURE — 2022F DILAT RTA XM EVC RTNOPTHY: CPT | Performed by: INTERNAL MEDICINE

## 2020-11-03 PROCEDURE — G8417 CALC BMI ABV UP PARAM F/U: HCPCS | Performed by: INTERNAL MEDICINE

## 2020-11-03 RX ORDER — CLONIDINE HYDROCHLORIDE 0.3 MG/1
0.6 TABLET ORAL
Qty: 60 TAB | Refills: 5 | Status: SHIPPED | OUTPATIENT
Start: 2020-11-03 | End: 2021-06-07

## 2020-11-03 NOTE — PROGRESS NOTES
SPORTS MEDICINE AND PRIMARY CARE  Balbir Benoit MD, [address] [address]  Phone:  [phone]  Fax: [phone]      Chief Complaint   Patient presents with    Hypertension         SUBECTIVE:    Ashley Landaverde is a 30 y.o. male Patient returns today with known history of malignant hypertension, obesity, type 2 diabetes, pulmonary hypertension, grade 2 diastolic dysfunction, and is seen for evaluation. He voices no new complaints. He claims to be taking his medications on a regular basis. Current Outpatient Medications   Medication Sig Dispense Refill    cloNIDine HCL (CATAPRES) 0.3 mg tablet Take 2 Tabs by mouth nightly. 60 Tab 5    metoprolol succinate (TOPROL-XL) 100 mg tablet Take 2 Tabs by mouth nightly. 90 Tab 3    isosorbide mononitrate ER (IMDUR) 30 mg tablet Take 1 Tab by mouth every morning. 30 Tab 11    spironolactone (ALDACTONE) 50 mg tablet Take 1 Tab by mouth daily. 30 Tab 5    telmisartan (MICARDIS) 80 mg tablet Take 1 Tab by mouth daily. 30 Tab 11    furosemide (LASIX) 40 mg tablet Take 1 Tab by mouth two (2) times a day. 180 Tab 3    minoxidiL (LONITEN) 10 mg tablet Take 3 Tabs by mouth two (2) times a day. 540 Tab 3    metFORMIN ER (GLUCOPHAGE XR) 500 mg tablet Take 2 Tabs by mouth Before breakfast and dinner. And 1 with bf 360 Tab 3    ferrous gluconate 324 mg (37.5 mg iron) tablet Take 1 Tab by mouth every other day. 90 Tab 11    amLODIPine (NORVASC) 10 mg tablet Take 1 Tab by mouth daily.  80 Tab 3     Past Medical History:   Diagnosis Date    Blurred vision, bilateral     [facility]di[facility] mellitus, type 2) [facility] 04/03/2018    Grade II diastolic dysfunction 40/97/6684    HTN (hypertension), malignant     Learning disability 1991    fell hit head and thereafter could not remember according to     Morbid obesity with BMI of 45.0-49.9, adult ([address]     Non-compliance 12/10/2018    Pulmonary hypertension, moderate to severe ([address] 12/10/2018 chip     History reviewed. No pertinent surgical history. Not on File    REVIEW OF SYSTEMS:   No chest pain, no shortness of breath. Social History     Socioeconomic History    Marital status: SINGLE     Spouse name: Not on file    Number of children: Not on file    Years of education: Not on file    Highest education level: Not on file   Tobacco Use    Smoking status: Former Smoker     Last attempt to quit: 2018     Years since quittin.8    Smokeless tobacco: Never Used   Substance and Sexual Activity    Alcohol use: No    Drug use: No    Sexual activity: Not Currently   Social History Narrative    Medical History: KeloidObesity    Surgical History: Denies Past Surgical History    Hospitalization/Major Diagnostic Procedure: Denies Past Hospitalization    Family History: Mother: alive 37 yrs a/w Father: alive 36 yrs a/w Sister(s): alive Brother(s): alive Grandmother: [facility] 64 [facility] 3 brother(s) , 2 sister(s) . Social History: Alcohol Use Patient does not use alcohol. Smoking Status Patient is a current every day smoker, Received cessation    counseling on: 2013. Marital Status: Single. Lives w ith: grandmother. Occupation/W ork: employed full time [employer] . Education/School: has highschool diploma. Temple: no.   r  History reviewed. No pertinent family history. OBJECTIVE:  Visit Vitals  BP (!) 158/71   Pulse (!) 112   Temp 98.2 °F (36.8 °C) (Oral)   Resp 20   Ht 5' 7\" (1.702 m)   Wt 282 lb 1.6 oz (128 kg)   SpO2 97%   BMI 44.18 kg/m²     ENT: perrla,  eom intact  NECK: supple.  Thyroid normal  CHEST: clear to ascultation and percussion   HEART: regular rate and rhythm  ABD: soft, bowel sounds active  EXTREMITIES: no edema, pulse 1+     Office Visit on 2020   Component Date Value Ref Range Status    Glucose 2020 115* 65 - 99 mg/dL Final    BUN 2020 16  6 - 20 mg/dL Final    Creatinine 2020 1.49* 0.76 - 1.27 mg/dL Final    GFR est non-AA 09/08/2020 61  >59 mL/min/1.73 Final    GFR est AA 09/08/2020 71  >59 mL/min/1.73 Final    BUN/Creatinine ratio 09/08/2020 11  9 - 20 Final    Sodium 09/08/2020 142  134 - 144 mmol/L Final    Potassium 09/08/2020 4.4  3.5 - 5.2 mmol/L Final    Chloride 09/08/2020 101  96 - 106 mmol/L Final    CO2 09/08/2020 26  20 - 29 mmol/L Final    Calcium 09/08/2020 10.0  8.7 - 10.2 mg/dL Final    Phosphorus 09/08/2020 4.8* 2.8 - 4.1 mg/dL Final    Albumin 09/08/2020 4.7  4.0 - 5.0 g/dL Final    Hemoglobin A1c 09/08/2020 6.8* 4.8 - 5.6 % Final    Comment:          Prediabetes: 5.7 - 6.4           Diabetes: >6.4           Glycemic control for adults with diabetes: <7.0      Estimated average glucose 09/08/2020 148  mg/dL Final    VITAMIN D, 25-HYDROXY 09/08/2020 17.5* 30.0 - 100.0 ng/mL Final    Comment: Vitamin D deficiency has been defined by the [place] of  Medicine and an Endocrine Society practice guideline as a  level of serum 25-OH vitamin D less than 20 ng/mL (1,2). The Endocrine Society went on to further define vitamin D  insufficiency as a level between 21 and 29 ng/mL (2). 1. IOM ([place] of Medicine). 2010. Dietary reference     intakes for calcium and D. [facility]: The     [employer]. 2. Yulissa MF, Praveena WILL, Charlotte PHELPS, et al.     Evaluation, treatment, and prevention of vitamin D     deficiency: an Endocrine Society clinical practice     guideline. JCEM. 2011 Jul; 96(7):1911-30. ASSESSMENT:  1. HTN (hypertension), malignant    2. Obesity, morbid ([address]    3. Type 2 diabetes mellitus without complication, without long-term current use of insulin (HCC)    4. Pulmonary hypertension, moderate to severe ([address]    5. Grade II diastolic dysfunction    6.  SOB (shortness of breath)      He tells me blood pressure now at home is in the 140s, which is significantly better than it has been in the past, but still not at goal. We will increase the Clonidine to 0.6 mg q.h.s. and expect it to be in the 130s the next time we see him. If it is not, we will increase the Isordil. Obesity remains a challenge for him. He has gained another 3 pounds since we last saw him and we find no evidence, however, of cardiac decompensation and no edema. Blood sugar control has been good with hemoglobin A1c in the 6 range on Metformin alone. No symptoms referable to the history of pulmonary hypertension. No evidence of cardiac decompensation with his known history of grade 2 diastolic dysfunction. He will be back to see us in one month, sooner if he has any problems. I have discussed the diagnosis with the patient and the intended plan as seen in the  orders above. The patient understands and agees with the plan. The patient has   received an after visit summary and questions were answered concerning  future plans  Patient labs and/or xrays were reviewed  Past records were reviewed. PLAN:  .  Orders Placed This Encounter    RENAL FUNCTION PANEL    BNP (NT-PRO)    REFERRAL TO OPHTHALMOLOGY    cloNIDine HCL (CATAPRES) 0.3 mg tablet       Follow-up and Dispositions    · Return in about 4 weeks (around 12/1/2020). ATTENTION:   This medical record was transcribed using an electronic medical records system. Although proofread, it may and can contain electronic and spelling errors. Other human spelling and other errors may be present. Corrections may be executed at a later time. Please feel free to contact us for any clarifications as needed.

## 2020-11-04 LAB
ALBUMIN SERPL-MCNC: 5.2 G/DL (ref 4–5)
BUN SERPL-MCNC: 20 MG/DL (ref 6–20)
BUN/CREAT SERPL: 11 (ref 9–20)
CALCIUM SERPL-MCNC: 10.3 MG/DL (ref 8.7–10.2)
CHLORIDE SERPL-SCNC: 99 MMOL/L (ref 96–106)
CO2 SERPL-SCNC: 23 MMOL/L (ref 20–29)
CREAT SERPL-MCNC: 1.89 MG/DL (ref 0.76–1.27)
GLUCOSE SERPL-MCNC: 123 MG/DL (ref 65–99)
NT-PROBNP SERPL-MCNC: 151 PG/ML (ref 0–86)
PHOSPHATE SERPL-MCNC: 3.6 MG/DL (ref 2.8–4.1)
POTASSIUM SERPL-SCNC: 4.3 MMOL/L (ref 3.5–5.2)
SODIUM SERPL-SCNC: 140 MMOL/L (ref 134–144)

## 2020-12-09 ENCOUNTER — OFFICE VISIT (OUTPATIENT)
Dept: INTERNAL MEDICINE CLINIC | Age: 35
End: 2020-12-09
Payer: MEDICARE

## 2020-12-09 VITALS
OXYGEN SATURATION: 100 % | SYSTOLIC BLOOD PRESSURE: 165 MMHG | WEIGHT: 279.6 LBS | RESPIRATION RATE: 20 BRPM | HEIGHT: 67 IN | BODY MASS INDEX: 43.88 KG/M2 | DIASTOLIC BLOOD PRESSURE: 68 MMHG | TEMPERATURE: 98 F | HEART RATE: 100 BPM

## 2020-12-09 DIAGNOSIS — I10 HTN (HYPERTENSION), MALIGNANT: Primary | ICD-10-CM

## 2020-12-09 DIAGNOSIS — E11.9 TYPE 2 DIABETES MELLITUS WITHOUT COMPLICATION, WITHOUT LONG-TERM CURRENT USE OF INSULIN (HCC): ICD-10-CM

## 2020-12-09 DIAGNOSIS — E66.01 OBESITY, MORBID (HCC): ICD-10-CM

## 2020-12-09 DIAGNOSIS — I51.89 GRADE II DIASTOLIC DYSFUNCTION: ICD-10-CM

## 2020-12-09 DIAGNOSIS — F81.9 LEARNING DISABILITY: ICD-10-CM

## 2020-12-09 PROCEDURE — G8753 SYS BP > OR = 140: HCPCS | Performed by: INTERNAL MEDICINE

## 2020-12-09 PROCEDURE — G8427 DOCREV CUR MEDS BY ELIG CLIN: HCPCS | Performed by: INTERNAL MEDICINE

## 2020-12-09 PROCEDURE — G8432 DEP SCR NOT DOC, RNG: HCPCS | Performed by: INTERNAL MEDICINE

## 2020-12-09 PROCEDURE — G8417 CALC BMI ABV UP PARAM F/U: HCPCS | Performed by: INTERNAL MEDICINE

## 2020-12-09 PROCEDURE — 2022F DILAT RTA XM EVC RTNOPTHY: CPT | Performed by: INTERNAL MEDICINE

## 2020-12-09 PROCEDURE — 36415 COLL VENOUS BLD VENIPUNCTURE: CPT | Performed by: INTERNAL MEDICINE

## 2020-12-09 PROCEDURE — G8754 DIAS BP LESS 90: HCPCS | Performed by: INTERNAL MEDICINE

## 2020-12-09 PROCEDURE — 3044F HG A1C LEVEL LT 7.0%: CPT | Performed by: INTERNAL MEDICINE

## 2020-12-09 PROCEDURE — 99213 OFFICE O/P EST LOW 20 MIN: CPT | Performed by: INTERNAL MEDICINE

## 2020-12-09 RX ORDER — MINOXIDIL 10 MG/1
30 TABLET ORAL DAILY
Qty: 630 TAB | Refills: 3 | Status: SHIPPED | OUTPATIENT
Start: 2020-12-09 | End: 2021-02-08 | Stop reason: SDUPTHER

## 2020-12-09 NOTE — PROGRESS NOTES
SPORTS MEDICINE AND PRIMARY CARE  Patito Blanc MD, [address] [facility],[address] [address]  Phone:  [phone]  Fax: [phone]      Chief Complaint   Patient presents with    Hypertension         SUBECTIVE:    Courtney Horne is a 37 y.o. male Patient returns today with a known history of hypertension, grade 2 diastolic dysfunction, type 2 diabetes, morbid obesity, learning disability, and is seen for evaluation. Patient is concerned about a lesion on the left side of his face in the cheek area. Other new complaints denied. He is compliant with his medication and is seen for evaluation. There is no chest pain, no SOB and no swelling. Current Outpatient Medications   Medication Sig Dispense Refill    minoxidiL (LONITEN) 10 mg tablet Take 3 Tabs by mouth daily. In am and 4 tabs in pm 630 Tab 3    cloNIDine HCL (CATAPRES) 0.3 mg tablet Take 2 Tabs by mouth nightly. 60 Tab 5    metoprolol succinate (TOPROL-XL) 100 mg tablet Take 2 Tabs by mouth nightly. 90 Tab 3    isosorbide mononitrate ER (IMDUR) 30 mg tablet Take 1 Tab by mouth every morning. 30 Tab 11    spironolactone (ALDACTONE) 50 mg tablet Take 1 Tab by mouth daily. 30 Tab 5    telmisartan (MICARDIS) 80 mg tablet Take 1 Tab by mouth daily. 30 Tab 11    furosemide (LASIX) 40 mg tablet Take 1 Tab by mouth two (2) times a day. 180 Tab 3    metFORMIN ER (GLUCOPHAGE XR) 500 mg tablet Take 2 Tabs by mouth Before breakfast and dinner. And 1 with bf 360 Tab 3    amLODIPine (NORVASC) 10 mg tablet Take 1 Tab by mouth daily. 90 Tab 3    ferrous gluconate 324 mg (37.5 mg iron) tablet Take 1 Tab by mouth every other day.  80 Tab 11     Past Medical History:   Diagnosis Date    Blurred vision, bilateral     [facility] 04/03/2018    Grade II diastolic dysfunction 72/49/3178    HTN (hypertension), malignant     Learning disability 1991    fell hit head and thereafter could not remember according to gm    Morbid obesity with BMI of 45.0-49.9, adult ([facility]     Non-compliance 12/10/2018    Pulmonary hypertension, moderate to severe ([facility] 12/10/2018    chip     History reviewed. No pertinent surgical history. Not on File    REVIEW OF SYSTEMS:   No chest pain, no shortness of breath. Social History     Socioeconomic History    Marital status: SINGLE     Spouse name: Not on file    Number of children: Not on file    Years of education: Not on file    Highest education level: Not on file   Tobacco Use    Smoking status: Former Smoker     Last attempt to quit: 2018     Years since quittin.9    Smokeless tobacco: Never Used   Substance and Sexual Activity    Alcohol use: No    Drug use: No    Sexual activity: Not Currently   Social History Narrative    Medical History: KeloidObesity    Surgical History: Denies Past Surgical History    Hospitalization/Major Diagnostic Procedure: Denies Past Hospitalization    Family History: Mother: alive 37 yrs a/w Father: alive 36 yrs a/w Sister(s): alive Brother(s): alive Grandmother: alive 64 yrs Moni Louis 3 brother(s) , 2 sister(s) . Social History: Alcohol Use Patient does not use alcohol. Smoking Status Patient is a current every day smoker, Received cessation    counseling on: 2013. Marital Status: Single. Lives w ith: grandmother. Occupation/W ork: employed full time [employer] . Education/School: has highschool diploma. Buddhist: no.   r  History reviewed. No pertinent family history. OBJECTIVE:  Visit Vitals  BP (!) 165/68   Pulse 100   Temp 98 °F (36.7 °C) (Oral)   Resp 20   Ht 5' 7\" (1.702 m)   Wt 279 lb 9.6 oz (126.8 kg)   SpO2 100%   BMI 43.79 kg/m²     ENT: perrla,  eom intact  NECK: supple.  Thyroid normal  CHEST: clear to ascultation and percussion   HEART: regular rate and rhythm  ABD: soft, bowel sounds active  EXTREMITIES: no edema, pulse 1+     Office Visit on 2020   Component Date Value Ref Range Status    Glucose 11/03/2020 123* 65 - 99 mg/dL Final    BUN 11/03/2020 20  6 - 20 mg/dL Final    Creatinine 11/03/2020 1.89* 0.76 - 1.27 mg/dL Final    GFR est non-AA 11/03/2020 46* >59 mL/min/1.73 Final    GFR est AA 11/03/2020 53* >59 mL/min/1.73 Final    BUN/Creatinine ratio 11/03/2020 11  9 - 20 Final    Sodium 11/03/2020 140  134 - 144 mmol/L Final    Potassium 11/03/2020 4.3  3.5 - 5.2 mmol/L Final    Chloride 11/03/2020 99  96 - 106 mmol/L Final    CO2 11/03/2020 23  20 - 29 mmol/L Final    Calcium 11/03/2020 10.3* 8.7 - 10.2 mg/dL Final    Phosphorus 11/03/2020 3.6  2.8 - 4.1 mg/dL Final    Albumin 11/03/2020 5.2* 4.0 - 5.0 g/dL Final    PROBNP 11/03/2020 151* 0 - 86 pg/mL Final    Comment: The following cut-points have been suggested for the  use of proBNP for the diagnostic evaluation of heart  failure (HF) in patients with acute dyspnea:  Modality                     Age           Optimal Cut                             (years)            Point  ------------------------------------------------------  Diagnosis (rule in HF)        <50            450 pg/mL                            50 - 75            900 pg/mL                                >75           1800 pg/mL  Exclusion (rule out HF)  Age independent     300 pg/mL            ASSESSMENT:  1. HTN (hypertension), malignant    2. Grade II diastolic dysfunction    3. Type 2 diabetes mellitus without complication, without long-term current use of insulin (HCC)    4. Obesity, morbid ([address]    5. Learning disability      No evidence of cardiac decompensation. Blood pressure control remains lacking. It gets down to 150 at home and does not drop lower than that. We will increase Minoxidil to 30 mg in the morning, 40 mg in the evening. If it remains elevated, we will go to 40 and 40. All of the other medications will stay the same. The lesion on his face I think is related to a mole. His grandmother wonders if it is a keloid.  We offer dermatological referral, but he is not ready at this time. There is no evidence of cardiac decompensation in spite of his known history of grade 2 diastolic dysfunction. We will assess blood sugar control with hemoglobin A1c. We encouraged a heart healthy, weight reducing diet. He will be back to see us in one month. I have discussed the diagnosis with the patient and the intended plan as seen in the  orders above. The patient understands and agees with the plan. The patient has   received an after visit summary and questions were answered concerning  future plans  Patient labs and/or xrays were reviewed  Past records were reviewed. PLAN:  .  Orders Placed This Encounter    RENAL FUNCTION PANEL    HEMOGLOBIN A1C WITH EAG    minoxidiL (LONITEN) 10 mg tablet       Follow-up and Dispositions    · Return in about 4 weeks (around 1/6/2021). ATTENTION:   This medical record was transcribed using an electronic medical records system. Although proofread, it may and can contain electronic and spelling errors. Other human spelling and other errors may be present. Corrections may be executed at a later time. Please feel free to contact us for any clarifications as needed.

## 2020-12-09 NOTE — PROGRESS NOTES
1. Have you been to the ER, urgent care clinic since your last visit? Hospitalized since your last visit? No    2. Have you seen or consulted any other health care providers outside of the [address] [place] [address] since your last visit? Include any pap smears or colon screening.  No     Wants to discuss bump that itches on face

## 2020-12-10 LAB
ALBUMIN SERPL-MCNC: 4.7 G/DL (ref 3.5–5)
ANION GAP SERPL CALC-SCNC: 9 MMOL/L (ref 5–15)
BUN SERPL-MCNC: 26 MG/DL (ref 6–20)
BUN/CREAT SERPL: 13 (ref 12–20)
CALCIUM SERPL-MCNC: 10 MG/DL (ref 8.5–10.1)
CHLORIDE SERPL-SCNC: 100 MMOL/L (ref 97–108)
CO2 SERPL-SCNC: 28 MMOL/L (ref 21–32)
CREAT SERPL-MCNC: 2 MG/DL (ref 0.7–1.3)
EST. AVERAGE GLUCOSE BLD GHB EST-MCNC: 143 MG/DL
GLUCOSE SERPL-MCNC: 114 MG/DL (ref 65–100)
HBA1C MFR BLD: 6.6 % (ref 4–5.6)
PHOSPHATE SERPL-MCNC: 4.5 MG/DL (ref 2.6–4.7)
POTASSIUM SERPL-SCNC: 4.4 MMOL/L (ref 3.5–5.1)
SODIUM SERPL-SCNC: 137 MMOL/L (ref 136–145)

## 2021-01-20 ENCOUNTER — OFFICE VISIT (OUTPATIENT)
Dept: INTERNAL MEDICINE CLINIC | Age: 36
End: 2021-01-20
Payer: MEDICARE

## 2021-01-20 VITALS
BODY MASS INDEX: 44.8 KG/M2 | RESPIRATION RATE: 18 BRPM | HEIGHT: 67 IN | DIASTOLIC BLOOD PRESSURE: 78 MMHG | OXYGEN SATURATION: 99 % | HEART RATE: 83 BPM | WEIGHT: 285.4 LBS | TEMPERATURE: 98 F | SYSTOLIC BLOOD PRESSURE: 163 MMHG

## 2021-01-20 DIAGNOSIS — R06.02 SOB (SHORTNESS OF BREATH): ICD-10-CM

## 2021-01-20 DIAGNOSIS — F81.9 LEARNING DISABILITY: ICD-10-CM

## 2021-01-20 DIAGNOSIS — E11.9 TYPE 2 DIABETES MELLITUS WITHOUT COMPLICATION, WITHOUT LONG-TERM CURRENT USE OF INSULIN (HCC): ICD-10-CM

## 2021-01-20 DIAGNOSIS — I51.89 GRADE II DIASTOLIC DYSFUNCTION: ICD-10-CM

## 2021-01-20 DIAGNOSIS — I10 HTN (HYPERTENSION), MALIGNANT: Primary | ICD-10-CM

## 2021-01-20 DIAGNOSIS — E66.01 OBESITY, MORBID (HCC): ICD-10-CM

## 2021-01-20 DIAGNOSIS — I27.20 PULMONARY HYPERTENSION, MODERATE TO SEVERE (HCC): ICD-10-CM

## 2021-01-20 PROCEDURE — G8754 DIAS BP LESS 90: HCPCS | Performed by: INTERNAL MEDICINE

## 2021-01-20 PROCEDURE — 36415 COLL VENOUS BLD VENIPUNCTURE: CPT | Performed by: INTERNAL MEDICINE

## 2021-01-20 PROCEDURE — 3046F HEMOGLOBIN A1C LEVEL >9.0%: CPT | Performed by: INTERNAL MEDICINE

## 2021-01-20 PROCEDURE — G8753 SYS BP > OR = 140: HCPCS | Performed by: INTERNAL MEDICINE

## 2021-01-20 PROCEDURE — 2022F DILAT RTA XM EVC RTNOPTHY: CPT | Performed by: INTERNAL MEDICINE

## 2021-01-20 PROCEDURE — G8417 CALC BMI ABV UP PARAM F/U: HCPCS | Performed by: INTERNAL MEDICINE

## 2021-01-20 PROCEDURE — G8427 DOCREV CUR MEDS BY ELIG CLIN: HCPCS | Performed by: INTERNAL MEDICINE

## 2021-01-20 PROCEDURE — G8510 SCR DEP NEG, NO PLAN REQD: HCPCS | Performed by: INTERNAL MEDICINE

## 2021-01-20 PROCEDURE — 99213 OFFICE O/P EST LOW 20 MIN: CPT | Performed by: INTERNAL MEDICINE

## 2021-01-20 NOTE — PROGRESS NOTES
1. Have you been to the ER, urgent care clinic since your last visit? Hospitalized since your last visit? No    2. Have you seen or consulted any other health care providers outside of the [address] [place] [address] since your last visit? Include any pap smears or colon screening.  No

## 2021-01-20 NOTE — PROGRESS NOTES
SPORTS MEDICINE AND PRIMARY CARE  Kp Sinha MD, [address] [address]  Phone:  [phone]  Fax: [phone]       Chief Complaint   Patient presents with    Hypertension   . SUBJECTIVE:    Zeus Portillo is a 37 y.o. male Patient returns today with a known history of malignant hypertension, grade 2 diastolic dysfunction, pulmonary hypertension, type 2 diabetes, morbid obesity, learning disability, and is seen for evaluation. Patient returns today without new complaints and is seen for evaluation. He has had a sleep study, which apparently was negative. Current Outpatient Medications   Medication Sig Dispense Refill    minoxidiL (LONITEN) 10 mg tablet Take 3 Tabs by mouth daily. In am and 4 tabs in pm 630 Tab 3    cloNIDine HCL (CATAPRES) 0.3 mg tablet Take 2 Tabs by mouth nightly. 60 Tab 5    metoprolol succinate (TOPROL-XL) 100 mg tablet Take 2 Tabs by mouth nightly. 90 Tab 3    isosorbide mononitrate ER (IMDUR) 30 mg tablet Take 1 Tab by mouth every morning. 30 Tab 11    spironolactone (ALDACTONE) 50 mg tablet Take 1 Tab by mouth daily. 30 Tab 5    telmisartan (MICARDIS) 80 mg tablet Take 1 Tab by mouth daily. 30 Tab 11    furosemide (LASIX) 40 mg tablet Take 1 Tab by mouth two (2) times a day. 180 Tab 3    metFORMIN ER (GLUCOPHAGE XR) 500 mg tablet Take 2 Tabs by mouth Before breakfast and dinner. And 1 with bf 360 Tab 3    amLODIPine (NORVASC) 10 mg tablet Take 1 Tab by mouth daily. 90 Tab 3    ferrous gluconate 324 mg (37.5 mg iron) tablet Take 1 Tab by mouth every other day.  80 Tab 11     Past Medical History:   Diagnosis Date    Blurred vision, bilateral     [facility] 04/[facility]/2018    Grade II diastolic dysfunction 64/15/4709    HTN (hypertension), malignant     Learning disability 1991    fell hit head and thereafter could not remember according to     Morbid obesity with BMI of 45.0-49.9, adult ([facility]     Non-compliance 12/10/2018    Pulmonary hypertension, moderate to severe ([address] 12/10/2018    chip     History reviewed. No pertinent surgical history. Not on File      REVIEW OF SYSTEMS:  General: negative for - chills or fever  ENT: negative for - headaches, nasal congestion or tinnitus  Respiratory: negative for - cough, hemoptysis, shortness of breath or wheezing  Cardiovascular : negative for - chest pain, edema, palpitations or shortness of breath  Gastrointestinal: negative for - abdominal pain, blood in stools, heartburn or nausea/vomiting  Genito-Urinary: no dysuria, trouble voiding, or hematuria  Musculoskeletal: negative for - gait disturbance, joint pain, joint stiffness or joint swelling  Neurological: no TIA or stroke symptoms  Hematologic: no bruises, no bleeding, no swollen glands  Integument: no lumps, mole changes, nail changes or rash  Endocrine: no malaise/lethargy or unexpected weight changes      Social History     Socioeconomic History    Marital status: SINGLE     Spouse name: Not on file    Number of children: Not on file    Years of education: Not on file    Highest education level: Not on file   Tobacco Use    Smoking status: Former Smoker     Quit date: 2018     Years since quittin.1    Smokeless tobacco: Never Used   Substance and Sexual Activity    Alcohol use: No    Drug use: No    Sexual activity: Not Currently   Social History Narrative    Medical History: KeloidObesity    Surgical History: Denies Past Surgical History    Hospitalization/Major Diagnostic Procedure: Denies Past Hospitalization    Family History: Mother: alive 37 yrs a/w Father: alive 36 yrs a/w Sister(s): alive Brother(s): alive Grandmother: alive 64 yrs Tsosie Quant 3 brother(s) , 2 sister(s) . Social History: Alcohol Use Patient does not use alcohol. Smoking Status Patient is a current every day smoker, Received cessation    counseling on: 2013.          Marital Status: Single. Lives w ith: grandmother. Occupation/W ork: employed full time [employer] make [employer] . Education/School: has highschool diploma. Sabianism: no.     History reviewed. No pertinent family history. OBJECTIVE:    Visit Vitals  BP (!) 163/78   Pulse 83   Temp 98 °F (36.7 °C) (Oral)   Resp 18   Ht 5' 7\" (1.702 m)   Wt 285 lb 6.4 oz (129.5 kg)   SpO2 99%   BMI 44.70 kg/m²     CONSTITUTIONAL: well , well nourished, appears age appropriate  EYES: perrla, eom intact  ENMT:moist mucous membranes, pharynx clear  NECK: supple. Thyroid normal  RESPIRATORY: Chest: clear bilaterally   CARDIOVASCULAR: Heart: regular rate and rhythm  GASTROINTESTINAL: Abdomen: soft, bowel sounds active  HEMATOLOGIC: no pathological lymph nodes palpated  MUSCULOSKELETAL: Extremities: no edema, pulse 1+   INTEGUMENT: No unusual rashes or suspicious skin lesions noted. Nails appear normal.  NEUROLOGIC: non-focal exam   MENTAL STATUS: alert and oriented, appropriate affect           ASSESSMENT:  1. HTN (hypertension), malignant    2. Grade II diastolic dysfunction    3. Pulmonary hypertension, moderate to severe ([address]    4. Type 2 diabetes mellitus without complication, without long-term current use of insulin (HCC)    5. Obesity, morbid ([address]    6. Learning disability    7. SOB (shortness of breath)      Here in the office blood pressure is elevated, but this morning when he took it at home it was 138/85. We will not make any changes in his medications therefore. We will do another study looking for cause of hypertension with a renal artery ultrasound. He has grade 2 diastolic dysfunction with no evidence of cardiac decompensation. We will do a BNP to confirm that. We also note that his hemoglobin A1c was acceptable for diabetes. Obesity remains a concern. If we can get him to decrease his weight, I think that will help a lot with his blood pressure control.     He will be back to see us in six weeks, sooner if he has any problems. I have discussed the diagnosis with the patient and the intended plan as seen in the  Orders. The patient understands and agees with the plan. The patient has   received an after visit summary and questions were answered concerning  future plans  Patient labs and/or xrays were reviewed  Past records were reviewed. PLAN:  .  Orders Placed This Encounter    URINALYSIS W/ RFLX MICROSCOPIC    METABOLIC PANEL, COMPREHENSIVE    LIPID PANEL    TSH 3RD GENERATION    NT-PRO BNP       Follow-up and Dispositions    · Return in about 6 weeks (around 3/3/2021). Follow-up and Disposition History                 ATTENTION:   This medical record was transcribed using an electronic medical records system. Although proofread, it may and can contain electronic and spelling errors. Other human spelling and other errors may be present. Corrections may be executed at a later time. Please feel free to contact us for any clarifications as needed.

## 2021-01-21 LAB
ALBUMIN SERPL-MCNC: 4.7 G/DL (ref 3.5–5)
ALBUMIN/GLOB SERPL: 1 {RATIO} (ref 1.1–2.2)
ALP SERPL-CCNC: 93 U/L (ref 45–117)
ALT SERPL-CCNC: 22 U/L (ref 12–78)
ANION GAP SERPL CALC-SCNC: 7 MMOL/L (ref 5–15)
APPEARANCE UR: CLEAR
AST SERPL-CCNC: 11 U/L (ref 15–37)
BILIRUB SERPL-MCNC: 0.5 MG/DL (ref 0.2–1)
BILIRUB UR QL: NEGATIVE
BNP SERPL-MCNC: 101 PG/ML
BUN SERPL-MCNC: 28 MG/DL (ref 6–20)
BUN/CREAT SERPL: 13 (ref 12–20)
CALCIUM SERPL-MCNC: 10 MG/DL (ref 8.5–10.1)
CHLORIDE SERPL-SCNC: 101 MMOL/L (ref 97–108)
CHOLEST SERPL-MCNC: 213 MG/DL
CO2 SERPL-SCNC: 27 MMOL/L (ref 21–32)
COLOR UR: NORMAL
CREAT SERPL-MCNC: 2.11 MG/DL (ref 0.7–1.3)
GLOBULIN SER CALC-MCNC: 4.9 G/DL (ref 2–4)
GLUCOSE SERPL-MCNC: 106 MG/DL (ref 65–100)
GLUCOSE UR STRIP.AUTO-MCNC: NEGATIVE MG/DL
HDLC SERPL-MCNC: 61 MG/DL
HDLC SERPL: 3.5 {RATIO} (ref 0–5)
HGB UR QL STRIP: NEGATIVE
KETONES UR QL STRIP.AUTO: NEGATIVE MG/DL
LDLC SERPL CALC-MCNC: 135 MG/DL (ref 0–100)
LEUKOCYTE ESTERASE UR QL STRIP.AUTO: NEGATIVE
LIPID PROFILE,FLP: ABNORMAL
NITRITE UR QL STRIP.AUTO: NEGATIVE
PH UR STRIP: 5 [PH] (ref 5–8)
POTASSIUM SERPL-SCNC: 4.4 MMOL/L (ref 3.5–5.1)
PROT SERPL-MCNC: 9.6 G/DL (ref 6.4–8.2)
PROT UR STRIP-MCNC: NEGATIVE MG/DL
SODIUM SERPL-SCNC: 135 MMOL/L (ref 136–145)
SP GR UR REFRACTOMETRY: 1.01 (ref 1–1.03)
TRIGL SERPL-MCNC: 85 MG/DL (ref ?–150)
TSH SERPL DL<=0.05 MIU/L-ACNC: 2.72 UIU/ML (ref 0.36–3.74)
UROBILINOGEN UR QL STRIP.AUTO: 0.2 EU/DL (ref 0.2–1)
VLDLC SERPL CALC-MCNC: 17 MG/DL

## 2021-02-08 RX ORDER — MINOXIDIL 10 MG/1
30 TABLET ORAL DAILY
Qty: 630 TAB | Refills: 3 | Status: SHIPPED | OUTPATIENT
Start: 2021-02-08 | End: 2021-02-24

## 2021-02-24 ENCOUNTER — OFFICE VISIT (OUTPATIENT)
Dept: INTERNAL MEDICINE CLINIC | Age: 36
End: 2021-02-24
Payer: MEDICARE

## 2021-02-24 VITALS
WEIGHT: 272.6 LBS | OXYGEN SATURATION: 98 % | DIASTOLIC BLOOD PRESSURE: 74 MMHG | BODY MASS INDEX: 42.79 KG/M2 | HEART RATE: 75 BPM | TEMPERATURE: 98 F | SYSTOLIC BLOOD PRESSURE: 143 MMHG | HEIGHT: 67 IN | RESPIRATION RATE: 16 BRPM

## 2021-02-24 DIAGNOSIS — I10 HTN (HYPERTENSION), MALIGNANT: Primary | ICD-10-CM

## 2021-02-24 DIAGNOSIS — I27.20 PULMONARY HYPERTENSION, MODERATE TO SEVERE (HCC): ICD-10-CM

## 2021-02-24 DIAGNOSIS — E11.9 TYPE 2 DIABETES MELLITUS WITHOUT COMPLICATION, WITHOUT LONG-TERM CURRENT USE OF INSULIN (HCC): ICD-10-CM

## 2021-02-24 DIAGNOSIS — E66.01 OBESITY, MORBID (HCC): ICD-10-CM

## 2021-02-24 DIAGNOSIS — F81.9 LEARNING DISABILITY: ICD-10-CM

## 2021-02-24 DIAGNOSIS — I51.89 GRADE II DIASTOLIC DYSFUNCTION: ICD-10-CM

## 2021-02-24 PROCEDURE — 99213 OFFICE O/P EST LOW 20 MIN: CPT | Performed by: INTERNAL MEDICINE

## 2021-02-24 PROCEDURE — G8754 DIAS BP LESS 90: HCPCS | Performed by: INTERNAL MEDICINE

## 2021-02-24 PROCEDURE — G8753 SYS BP > OR = 140: HCPCS | Performed by: INTERNAL MEDICINE

## 2021-02-24 PROCEDURE — G8432 DEP SCR NOT DOC, RNG: HCPCS | Performed by: INTERNAL MEDICINE

## 2021-02-24 PROCEDURE — 2022F DILAT RTA XM EVC RTNOPTHY: CPT | Performed by: INTERNAL MEDICINE

## 2021-02-24 PROCEDURE — G8427 DOCREV CUR MEDS BY ELIG CLIN: HCPCS | Performed by: INTERNAL MEDICINE

## 2021-02-24 PROCEDURE — G8417 CALC BMI ABV UP PARAM F/U: HCPCS | Performed by: INTERNAL MEDICINE

## 2021-02-24 PROCEDURE — 3046F HEMOGLOBIN A1C LEVEL >9.0%: CPT | Performed by: INTERNAL MEDICINE

## 2021-02-24 RX ORDER — MINOXIDIL 10 MG/1
40 TABLET ORAL 2 TIMES DAILY
Qty: 240 TAB | Refills: 11 | Status: SHIPPED | OUTPATIENT
Start: 2021-02-24 | End: 2022-02-15

## 2021-02-24 NOTE — PROGRESS NOTES
SPORTS MEDICINE AND PRIMARY CARE  Isrrael Quick MD, [address] [address]  Phone:  [phone]  Fax: [phone]       Chief Complaint   Patient presents with    Hypertension     Patient is here for a follow up. .      SUBJECTIVE:    Ankur Dickerson is a 37 y.o. male Patient returns today with a known history of primary hypertension, grade 2 diastolic dysfunction, pulmonary hypertension, type 2 diabetes, obesity, learning disability, and is seen for evaluation. Returns without complaints. No chest pain, no shortness of breath, no swelling. Current Outpatient Medications   Medication Sig Dispense Refill    minoxidiL (LONITEN) 10 mg tablet Take 4 Tabs by mouth two (2) times a day. In am and 4 tabs in pm 240 Tab 11    cloNIDine HCL (CATAPRES) 0.3 mg tablet Take 2 Tabs by mouth nightly. 60 Tab 5    metoprolol succinate (TOPROL-XL) 100 mg tablet Take 2 Tabs by mouth nightly. 90 Tab 3    isosorbide mononitrate ER (IMDUR) 30 mg tablet Take 1 Tab by mouth every morning. 30 Tab 11    spironolactone (ALDACTONE) 50 mg tablet Take 1 Tab by mouth daily. 30 Tab 5    telmisartan (MICARDIS) 80 mg tablet Take 1 Tab by mouth daily. 30 Tab 11    furosemide (LASIX) 40 mg tablet Take 1 Tab by mouth two (2) times a day. 180 Tab 3    metFORMIN ER (GLUCOPHAGE XR) 500 mg tablet Take 2 Tabs by mouth Before breakfast and dinner. And 1 with bf 360 Tab 3    amLODIPine (NORVASC) 10 mg tablet Take 1 Tab by mouth daily. 90 Tab 3    ferrous gluconate 324 mg (37.5 mg iron) tablet Take 1 Tab by mouth every other day.  80 Tab 11     Past Medical History:   Diagnosis Date    Blurred vision, bilateral     [facility]    Grade II diastolic dysfunction 89/54/9629    HTN (hypertension), malignant     Learning disability 1991    fell hit head and thereafter could not remember according to     Morbid obesity with BMI of 45.0-49.9, adult ([facility]     Non-compliance 12/10/2018    Pulmonary hypertension, moderate to severe ([address] 12/10/2018    chip     History reviewed. No pertinent surgical history. No Known Allergies      REVIEW OF SYSTEMS:  General: negative for - chills or fever  ENT: negative for - headaches, nasal congestion or tinnitus  Respiratory: negative for - cough, hemoptysis, shortness of breath or wheezing  Cardiovascular : negative for - chest pain, edema, palpitations or shortness of breath  Gastrointestinal: negative for - abdominal pain, blood in stools, heartburn or nausea/vomiting  Genito-Urinary: no dysuria, trouble voiding, or hematuria  Musculoskeletal: negative for - gait disturbance, joint pain, joint stiffness or joint swelling  Neurological: no TIA or stroke symptoms  Hematologic: no bruises, no bleeding, no swollen glands  Integument: no lumps, mole changes, nail changes or rash  Endocrine: no malaise/lethargy or unexpected weight changes      Social History     Socioeconomic History    Marital status: SINGLE     Spouse name: Not on file    Number of children: Not on file    Years of education: Not on file    Highest education level: Not on file   Tobacco Use    Smoking status: Former Smoker     Quit date: 2018     Years since quittin.2    Smokeless tobacco: Never Used   Substance and Sexual Activity    Alcohol use: No    Drug use: No    Sexual activity: Not Currently   Social History Narrative    Medical History: KeloidObesity    Surgical History: Denies Past Surgical History    Hospitalization/Major Diagnostic Procedure: Denies Past Hospitalization    Family History: Mother: alive 37 yrs a/w Father: alive 36 yrs a/w Sister(s): alive Brother(s): alive Grandmother: alive 64 yrs Nasir Shen 3 brother(s) , 2 sister(s) . Social History: Alcohol Use Patient does not use alcohol. Smoking Status Patient is a current every day smoker, Received cessation    counseling on: 2013.          Marital Status: Single. Lives w ith: grandmother. Occupation/W ork: employed full time [employer] make [employer] . Education/School: has highschool diploma. Adventist: no.     History reviewed. No pertinent family history. OBJECTIVE:    Visit Vitals  BP (!) 143/74   Pulse 75   Temp 98 °F (36.7 °C)   Resp 16   Ht 5' 7\" (1.702 m)   Wt 272 lb 9.6 oz (123.7 kg)   SpO2 98%   BMI 42.70 kg/m²     CONSTITUTIONAL: well , well nourished, appears age appropriate  EYES: perrla, eom intact  ENMT:moist mucous membranes, pharynx clear  NECK: supple. Thyroid normal  RESPIRATORY: Chest: clear bilaterally   CARDIOVASCULAR: Heart: regular rate and rhythm  GASTROINTESTINAL: Abdomen: soft, bowel sounds active  HEMATOLOGIC: no pathological lymph nodes palpated  MUSCULOSKELETAL: Extremities: no edema, pulse 1+   INTEGUMENT: No unusual rashes or suspicious skin lesions noted. Nails appear normal.  NEUROLOGIC: non-focal exam   MENTAL STATUS: alert and oriented, appropriate affect           ASSESSMENT:  1. HTN (hypertension), malignant    2. Grade II diastolic dysfunction    3. Pulmonary hypertension, moderate to severe ([address]    4. Type 2 diabetes mellitus without complication, without long-term current use of insulin (HCC)    5. Obesity, morbid ([address]    6. Learning disability      Blood pressure control is much better than it has been, unfortunately at the cost of kidney disease. We will increase Minoxidil to 40 mg twice a day and see if we can drop the blood pressure in the 130s most of the time, which in the long term will obviously be more beneficial.  Hopefully his renal function will stabilize. He has a history of grade 2 diastolic dysfunction with no evidence of cardiac decompensation. We will monitor his BMP on a regular basis. To that end also, we checked him for aldosteronism, which is not present. He has moderate to severe pulmonary hypertension, which currently is asymptomatic.     His blood sugar control has been excellent with hemoglobin A1c in December of 6.6. Obesity remains a challenge. He will be back in one month. If we drop his blood pressures into the 130s, then we can probably start stretching his visits out. He seems to be very compliant these days. We have also asked for a vascular evaluation of his renal status to rule out the rare possibility of renal artery stenosis. I have discussed the diagnosis with the patient and the intended plan as seen in the  Orders. The patient understands and agees with the plan. The patient has   received an after visit summary and questions were answered concerning  future plans  Patient labs and/or xrays were reviewed  Past records were reviewed. PLAN:  .  Orders Placed This Encounter    RENAL FUNCTION PANEL    minoxidiL (LONITEN) 10 mg tablet       Follow-up and Dispositions    · Return in about 4 weeks (around 3/24/2021). ATTENTION:   This medical record was transcribed using an electronic medical records system. Although proofread, it may and can contain electronic and spelling errors. Other human spelling and other errors may be present. Corrections may be executed at a later time. Please feel free to contact us for any clarifications as needed.

## 2021-02-24 NOTE — PROGRESS NOTES
Chief Complaint   Patient presents with    Hypertension     Patient is here for a follow up. 1. Have you been to the ER, urgent care clinic since your last visit? Hospitalized since your last visit? No    2. Have you seen or consulted any other health care providers outside of the [address] [place] [address] since your last visit? Include any pap smears or colon screening.  No

## 2021-02-25 LAB
ALBUMIN SERPL-MCNC: 4.6 G/DL (ref 3.5–5)
ANION GAP SERPL CALC-SCNC: 11 MMOL/L (ref 5–15)
BUN SERPL-MCNC: 26 MG/DL (ref 6–20)
BUN/CREAT SERPL: 12 (ref 12–20)
CALCIUM SERPL-MCNC: 9.2 MG/DL (ref 8.5–10.1)
CHLORIDE SERPL-SCNC: 101 MMOL/L (ref 97–108)
CO2 SERPL-SCNC: 23 MMOL/L (ref 21–32)
CREAT SERPL-MCNC: 2.19 MG/DL (ref 0.7–1.3)
GLUCOSE SERPL-MCNC: 99 MG/DL (ref 65–100)
PHOSPHATE SERPL-MCNC: 4.2 MG/DL (ref 2.6–4.7)
POTASSIUM SERPL-SCNC: 4.2 MMOL/L (ref 3.5–5.1)
SODIUM SERPL-SCNC: 135 MMOL/L (ref 136–145)

## 2021-03-08 RX ORDER — SPIRONOLACTONE 50 MG/1
50 TABLET, FILM COATED ORAL DAILY
Qty: 30 TAB | Refills: 5 | Status: SHIPPED | OUTPATIENT
Start: 2021-03-08 | End: 2021-12-29

## 2021-03-12 ENCOUNTER — HOSPITAL ENCOUNTER (OUTPATIENT)
Dept: VASCULAR SURGERY | Age: 36
Discharge: HOME | End: 2021-03-12
Attending: INTERNAL MEDICINE
Payer: MEDICARE

## 2021-03-12 VITALS — SYSTOLIC BLOOD PRESSURE: 144 MMHG | DIASTOLIC BLOOD PRESSURE: 84 MMHG

## 2021-03-12 DIAGNOSIS — I10 HTN (HYPERTENSION), MALIGNANT: ICD-10-CM

## 2021-03-12 LAB
ABDOMINAL PROX AORTA VEL: 146.9 CM/S
CELIAC EDV: 37.5 CM/S
CELIAC PSV: 221.9 CM/S
DIST AORTIC AP: 1.6 CM
LEFT KIDNEY LENGTH: 10.3 CM
LEFT KIDNEY WIDTH: 4.8 CM
LEFT RENAL DIST DIAS: 26.7 CM/S
LEFT RENAL DIST RAR: 0.9
LEFT RENAL DIST RI: 0.8
LEFT RENAL DIST SYS: 132.6 CM/S
LEFT RENAL LOWER PARENCHYMA MAX: 53.5 CM/S
LEFT RENAL LOWER PARENCHYMA MIN: 14.2 CM/S
LEFT RENAL LOWER PARENCHYMA RI: 0.73
LEFT RENAL MID DIAS: 34 CM/S
LEFT RENAL MID RAR: 0.87
LEFT RENAL MID RI: 0.73
LEFT RENAL MID SYS: 127.1 CM/S
LEFT RENAL MIDDLE PARENCHYMA MAX: 41.5 CM/S
LEFT RENAL MIDDLE PARENCHYMA MIN: 10.2 CM/S
LEFT RENAL MIDDLE PARENCHYMA RI: 0.75
LEFT RENAL PROX DIAS: 37.5 CM/S
LEFT RENAL PROX RAR: 1.09
LEFT RENAL PROX RI: 0.77
LEFT RENAL PROX SYS: 160.4 CM/S
LEFT RENAL UPPER PARENCHYMA MAX: 35 CM/S
LEFT RENAL UPPER PARENCHYMA MIN: 11 CM/S
LEFT RENAL UPPER PARENCHYMA RI: 0.69
PROX AORTA LONG DIAM: 1.9 CM
PROX AORTIC AP: 1.9 CM
PROX SMA EDV: 27.8 CM/S
PROX SMA PSV: 221.9 CM/S
RIGHT KIDNEY LENGTH: 11.3 CM
RIGHT KIDNEY WIDTH: 5.3 CM
RIGHT RENAL DIST DIAS: 32 CM/S
RIGHT RENAL DIST RAR: 1.09
RIGHT RENAL DIST RI: 0.8
RIGHT RENAL DIST SYS: 159.4 CM/S
RIGHT RENAL LOWER PARENCHYMA MAX: 25.6 CM/S
RIGHT RENAL LOWER PARENCHYMA MIN: 10.8 CM/S
RIGHT RENAL LOWER PARENCHYMA RI: 0.58
RIGHT RENAL MID DIAS: 38.6 CM/S
RIGHT RENAL MID RAR: 1.25
RIGHT RENAL MID RI: 0.79
RIGHT RENAL MID SYS: 183.5 CM/S
RIGHT RENAL MIDDLE PARENCHYMA MAX: 33.3 CM/S
RIGHT RENAL MIDDLE PARENCHYMA MIN: 11.9 CM/S
RIGHT RENAL MIDDLE PARENCHYMA RI: 0.64
RIGHT RENAL PROX DIAS: 31.9 CM/S
RIGHT RENAL PROX RAR: 1.12
RIGHT RENAL PROX RI: 0.81
RIGHT RENAL PROX SYS: 164 CM/S
RIGHT RENAL UPPER PARENCHYMA MAX: 25.6 CM/S
RIGHT RENAL UPPER PARENCHYMA MIN: 10.8 CM/S
RIGHT RENAL UPPER PARENCHYMA RI: 0.58

## 2021-03-12 PROCEDURE — 93975 VASCULAR STUDY: CPT

## 2021-06-07 RX ORDER — METOPROLOL SUCCINATE 100 MG/1
TABLET, EXTENDED RELEASE ORAL
Qty: 180 TABLET | Refills: 1 | Status: SHIPPED | OUTPATIENT
Start: 2021-06-07 | End: 2021-06-26

## 2021-06-07 RX ORDER — CLONIDINE HYDROCHLORIDE 0.3 MG/1
TABLET ORAL
Qty: 180 TABLET | Refills: 1 | Status: SHIPPED | OUTPATIENT
Start: 2021-06-07 | End: 2021-06-26

## 2021-06-26 RX ORDER — CLONIDINE HYDROCHLORIDE 0.3 MG/1
TABLET ORAL
Qty: 90 TABLET | Refills: 11 | Status: SHIPPED | OUTPATIENT
Start: 2021-06-26 | End: 2021-11-27

## 2021-06-26 RX ORDER — AMLODIPINE BESYLATE 10 MG/1
TABLET ORAL
Qty: 90 TABLET | Refills: 3 | Status: SHIPPED | OUTPATIENT
Start: 2021-06-26 | End: 2021-06-30

## 2021-06-26 RX ORDER — METOPROLOL SUCCINATE 100 MG/1
TABLET, EXTENDED RELEASE ORAL
Qty: 90 TABLET | Refills: 3 | Status: SHIPPED | OUTPATIENT
Start: 2021-06-26 | End: 2021-11-27

## 2021-06-30 RX ORDER — TELMISARTAN 80 MG/1
TABLET ORAL
Qty: 90 TABLET | Refills: 3 | Status: SHIPPED | OUTPATIENT
Start: 2021-06-30

## 2021-06-30 RX ORDER — AMLODIPINE BESYLATE 10 MG/1
TABLET ORAL
Qty: 90 TABLET | Refills: 3 | Status: SHIPPED | OUTPATIENT
Start: 2021-06-30 | End: 2022-07-22

## 2021-07-10 RX ORDER — FUROSEMIDE 40 MG/1
TABLET ORAL
Qty: 90 TABLET | Refills: 3 | Status: SHIPPED | OUTPATIENT
Start: 2021-07-10 | End: 2021-08-10

## 2021-08-10 RX ORDER — FUROSEMIDE 40 MG/1
TABLET ORAL
Qty: 180 TABLET | Refills: 3 | Status: SHIPPED | OUTPATIENT
Start: 2021-08-10 | End: 2022-07-22

## 2021-08-29 RX ORDER — METFORMIN HYDROCHLORIDE 500 MG/1
1000 TABLET, EXTENDED RELEASE ORAL
Qty: 360 TABLET | Refills: 3 | Status: SHIPPED | OUTPATIENT
Start: 2021-08-29 | End: 2022-07-22

## 2021-11-27 RX ORDER — CLONIDINE HYDROCHLORIDE 0.3 MG/1
TABLET ORAL
Qty: 180 TABLET | Refills: 1 | Status: SHIPPED | OUTPATIENT
Start: 2021-11-27 | End: 2022-05-26

## 2021-11-27 RX ORDER — METOPROLOL SUCCINATE 100 MG/1
TABLET, EXTENDED RELEASE ORAL
Qty: 180 TABLET | Refills: 1 | Status: SHIPPED | OUTPATIENT
Start: 2021-11-27 | End: 2022-05-26

## 2021-12-06 ENCOUNTER — TELEPHONE (OUTPATIENT)
Dept: INTERNAL MEDICINE CLINIC | Age: 36
End: 2021-12-06

## 2021-12-06 NOTE — TELEPHONE ENCOUNTER
Called to schedule a follow up visit, unable to reach patient. Left voicemail to call office to schedule a visit.

## 2021-12-23 ENCOUNTER — HOSPITAL ENCOUNTER (EMERGENCY)
Age: 36
Discharge: HOME | End: 2021-12-23
Attending: EMERGENCY MEDICINE
Payer: MEDICARE

## 2021-12-23 VITALS
SYSTOLIC BLOOD PRESSURE: 163 MMHG | DIASTOLIC BLOOD PRESSURE: 87 MMHG | BODY MASS INDEX: 44.29 KG/M2 | RESPIRATION RATE: 20 BRPM | TEMPERATURE: 97.5 F | OXYGEN SATURATION: 98 % | HEIGHT: 67 IN | WEIGHT: 282.19 LBS | HEART RATE: 85 BPM

## 2021-12-23 DIAGNOSIS — L72.9 SUBCUTANEOUS CYST: Primary | ICD-10-CM

## 2021-12-23 PROCEDURE — 75810000289 HC I&D ABSCESS SIMP/COMP/MULT

## 2021-12-23 PROCEDURE — 99282 EMERGENCY DEPT VISIT SF MDM: CPT

## 2021-12-23 RX ORDER — CEPHALEXIN 500 MG/1
500 CAPSULE ORAL 4 TIMES DAILY
Qty: 28 CAPSULE | Refills: 0 | Status: SHIPPED | OUTPATIENT
Start: 2021-12-23 | End: 2021-12-30

## 2021-12-23 RX ORDER — LIDOCAINE HYDROCHLORIDE 10 MG/ML
5 INJECTION INFILTRATION; PERINEURAL
Status: DISCONTINUED | OUTPATIENT
Start: 2021-12-23 | End: 2021-12-24 | Stop reason: HOSPADM

## 2021-12-24 NOTE — DISCHARGE INSTRUCTIONS
Take new antibiotic as prescribed. Return to PCP in 2-3 days to have packing removed and cyst checked.  Please keep area clean and dry

## 2021-12-24 NOTE — ED PROVIDER NOTES
31 y.o. male with h/o T2DM, HTN presents with a cyst on his neck. Patient reports that he noticed it about a week ago and has been worsening since. He reports that he used a hot water compress and noticed some draining, but pain has since worsened and seems to be getting bigger. He reports that he has gotten these before but usually he is able to just let them get better on their own. Patient denies any fever, chills, N/V/D, cough or shortness of breath. The history is provided by the patient. Skin Problem  Pertinent negatives include no chest pain and no headaches. Past Medical History:   Diagnosis Date    Blurred vision, bilateral     DM2 (diabetes mellitus, type 2) ([facility] 04/03/2018    Grade II diastolic dysfunction 86/79/1956    HTN (hypertension), malignant     Learning disability 1991    fell hit head and thereafter could not remember according to     Morbid obesity with BMI of 45.0-49.9, adult ([facility]     Non-compliance 12/10/2018    Pulmonary hypertension, moderate to severe ([facility] 12/10/2018    chip       History reviewed. No pertinent surgical history. History reviewed. No pertinent family history. Social History     Socioeconomic History    Marital status: SINGLE     Spouse name: Not on file    Number of children: Not on file    Years of education: Not on file    Highest education level: Not on file   Occupational History    Not on file   Tobacco Use    Smoking status: Former Smoker     Quit date: 12/14/2018     Years since quitting: 3.0    Smokeless tobacco: Never Used   Vaping Use    Vaping Use: Never used   Substance and Sexual Activity    Alcohol use: No    Drug use: No    Sexual activity: Not Currently   Other Topics Concern    Not on file   Social History Narrative    Medical History: KeloidObesity    Surgical History: Denies Past Surgical History    Hospitalization/Major Diagnostic Procedure: Denies Past Hospitalization    Family History:  Mother: alive 37 yrs a/w Father: alive 36 yrs a/w Sister(s): alive Brother(s): alive Grandmother: alive 64 yrs Andie Persaud 3 brother(s) , 2 sister(s) . Social History: Alcohol Use Patient does not use alcohol. Smoking Status Patient is a current every day smoker, Received cessation    counseling on: 11/18/2013. Marital Status: Single. Lives w ith: grandmother. Occupation/W ork: employed full time [employer] make [employer] . Education/School: has highschool diploma. Evangelical: no.     Social Determinants of Health     Financial Resource Strain:     Difficulty of Paying Living Expenses: Not on file   Food Insecurity:     Worried About Running Out of Food in the Last Year: Not on file    Prasad of Food in the Last Year: Not on file   Transportation Needs:     Lack of Transportation (Medical): Not on file    Lack of Transportation (Non-Medical): Not on file   Physical Activity:     Days of Exercise per Week: Not on file    Minutes of Exercise per Session: Not on file   Stress:     Feeling of Stress : Not on file   Social Connections:     Frequency of Communication with Friends and Family: Not on file    Frequency of Social Gatherings with Friends and Family: Not on file    Attends Orthodoxy Services: Not on file    Active Member of [address] [place] [address] [employer] or Organizations: Not on file    Attends Club or Organization Meetings: Not on file    Marital Status: Not on file   Intimate Partner Violence:     Fear of Current or Ex-Partner: Not on file    Emotionally Abused: Not on file    Physically Abused: Not on file    Sexually Abused: Not on file   Housing Stability:     Unable to Pay for Housing in the Last Year: Not on file    Number of [place] in the Last Year: Not on file    Unstable Housing in the Last Year: Not on file         ALLERGIES: Patient has no known allergies. Review of Systems   Constitutional: Negative for fever. Respiratory: Negative for cough. Cardiovascular: Negative for chest pain. Gastrointestinal: Negative for nausea. Genitourinary: Negative for dysuria. Musculoskeletal: Negative for myalgias. Skin: Positive for color change. Neurological: Negative for dizziness and headaches. Hematological: Negative for adenopathy. All other systems reviewed and are negative. Vitals:    12/23/21 1908   BP: (!) 163/87   Pulse: 85   Resp: 20   Temp: 97.5 °F (36.4 °C)   SpO2: 98%   Weight: 128 kg (282 lb 3 oz)   Height: 5' 7\" (1.702 m)            Physical Exam  Vitals and nursing note reviewed. Constitutional:       Appearance: Normal appearance. He is obese. HENT:      Mouth/Throat:      Mouth: Mucous membranes are dry. Pharynx: Oropharynx is clear. No oropharyngeal exudate or posterior oropharyngeal erythema. Eyes:      Extraocular Movements: Extraocular movements intact. Pupils: Pupils are equal, round, and reactive to light. Cardiovascular:      Rate and Rhythm: Normal rate and regular rhythm. Pulmonary:      Effort: Pulmonary effort is normal.   Abdominal:      Palpations: Abdomen is soft. Tenderness: There is no abdominal tenderness. Lymphadenopathy:      Cervical: No cervical adenopathy. Skin:     General: Skin is warm and dry. Findings: Abscess (3 cm in diameter abscess noted to right lateral aspect of neck. Abscess with surrounding erythema and noted induration and tenderness to palpation) present. Neurological:      Mental Status: He is alert and oriented to person, place, and time. Psychiatric:         Mood and Affect: Mood normal.          MDM  Number of Diagnoses or Management Options  Subcutaneous cyst  Diagnosis management comments: 42-year-old male presents to ED with concerns of cyst to neck. Patient has had abscesses before but this 1 has not gone away on its own. On physical exam a 3 cm abscess noted to the right aspect of his neck with surrounding erythema and noted induration and tenderness to palpation.   Patient agreed to incision and drainage. Procedure tolerated well. Patient will be discharged with prescription for antibiotic and instructions for follow-up. Amount and/or Complexity of Data Reviewed  Discuss the patient with other providers: yes           I&D Anne Simple    Date/Time: 12/24/2021 2:19 AM  Performed by: EMEKA Gold  Authorized by: EMEKA Gold     Consent:     Consent obtained:  Verbal    Consent given by:  Patient    Risks discussed:  Incomplete drainage, bleeding, pain and damage to other organs    Alternatives discussed:  No treatment  Location:     Type:  Abscess    Size:  3 cm    Location:  Neck    Neck location:  R anterior  Pre-procedure details:     Skin preparation:  Betadine  Anesthesia (see MAR for exact dosages): Anesthesia method:  Local infiltration    Local anesthetic:  Lidocaine 1% w/o epi  Procedure type:     Complexity:  Simple  Procedure details:     Needle aspiration: no      Incision types:  Single straight    Scalpel blade:  11    Wound management:  Probed and deloculated and extensive cleaning    Drainage:  Purulent and bloody    Drainage amount: Moderate    Wound treatment:  Wound left open    Packing materials:  1/2 in iodoform gauze  Post-procedure details:     Patient tolerance of procedure:   Tolerated well, no immediate complications

## 2021-12-24 NOTE — ED TRIAGE NOTES
Patient is coming in with boil to the right side of neck for 2 days. Patient states continues to get bigger. Denies fever.

## 2021-12-26 ENCOUNTER — HOSPITAL ENCOUNTER (EMERGENCY)
Age: 36
Discharge: HOME | End: 2021-12-26
Attending: EMERGENCY MEDICINE
Payer: MEDICARE

## 2021-12-26 VITALS
SYSTOLIC BLOOD PRESSURE: 168 MMHG | RESPIRATION RATE: 16 BRPM | OXYGEN SATURATION: 97 % | TEMPERATURE: 98.8 F | HEART RATE: 89 BPM | DIASTOLIC BLOOD PRESSURE: 88 MMHG

## 2021-12-26 DIAGNOSIS — Z51.89 VISIT FOR WOUND CHECK: Primary | ICD-10-CM

## 2021-12-26 DIAGNOSIS — Z48.00 ENCOUNTER FOR REMOVAL OF ABSCESS PACKING: ICD-10-CM

## 2021-12-26 PROCEDURE — 75810000275 HC EMERGENCY DEPT VISIT NO LEVEL OF CARE

## 2021-12-26 NOTE — DISCHARGE INSTRUCTIONS
Continue antibiotics, make an appointment with your PCP later this week for wound recheck to make sure everything is healing well

## 2021-12-26 NOTE — ED PROVIDER NOTES
4:05 PM  I have evaluated the patient as the Provider in Triage. I have reviewed His vital signs and the triage nurse assessment. I have talked with the patient and any available family and advised that I am the provider in triage and have ordered the appropriate study to initiate their work up based on the clinical presentation during my assessment. I have advised that the patient will be accommodated in the Main ED as soon as possible. I have also requested to contact the triage nurse or myself immediately if the patient experiences any changes in their condition during this brief waiting period. Here for packing removal after IandD of neck abscess.      Katheryn Quijano,

## 2021-12-26 NOTE — ED TRIAGE NOTES
Pt arrives ambulatory c/o wanting packing removed from neck abscess I/D at [facility] on 12/23. Pt has no other complaints.

## 2021-12-26 NOTE — ED PROVIDER NOTES
Please note that this dictation was completed with [employer], the computer voice recognition software.  Quite often unanticipated grammatical, syntax, homophones, and other interpretive errors are inadvertently transcribed by the computer software.  Please disregard these errors.  Please excuse any errors that have escaped final proofreading. Patient is a 26-year-old male with history of diabetes and hypertension presenting to ED for wound evaluation and packing removal.  He states that he was seen here 3 days ago and have abscess drained and has had packing placed. He feels like the wound is improving. Notes that he has a history of keloids and is concerned for this today. Has been taking antibiotics. Denies fever, chills, return of abscess, or any other medical complaints at this time. Past Medical History:   Diagnosis Date    Blurred vision, bilateral     DM2 (diabetes mellitus, type 2) ([facility] 04/03/2018    Grade II diastolic dysfunction 01/94/1164    HTN (hypertension), malignant     Learning disability 1991    fell hit head and thereafter could not remember according to     Morbid obesity with BMI of 45.0-49.9, adult ([facility]     Non-compliance 12/10/2018    Pulmonary hypertension, moderate to severe ([facility] 12/10/2018    chip       No past surgical history on file. No family history on file.     Social History     Socioeconomic History    Marital status: SINGLE     Spouse name: Not on file    Number of children: Not on file    Years of education: Not on file    Highest education level: Not on file   Occupational History    Not on file   Tobacco Use    Smoking status: Former Smoker     Quit date: 12/14/2018     Years since quitting: 3.0    Smokeless tobacco: Never Used   Vaping Use    Vaping Use: Never used   Substance and Sexual Activity    Alcohol use: No    Drug use: No    Sexual activity: Not Currently   Other Topics Concern    Not on file   Social History Narrative Medical History: KeloidObesity    Surgical History: Denies Past Surgical History    Hospitalization/Major Diagnostic Procedure: Denies Past Hospitalization    Family History: Mother: alive 37 yrs a/w Father: alive 36 yrs a/w Sister(s): alive Brother(s): alive Grandmother: alive 64 yrs Lavonne Ludwig 3 brother(s) , 2 sister(s) . Social History: Alcohol Use Patient does not use alcohol. Smoking Status Patient is a current every day smoker, Received cessation    counseling on: 11/18/2013. Marital Status: Single. Lives w ith: grandmother. Occupation/W ork: employed full time [employer] make [employer] . Education/School: has highschool diploma. Muslim: no.     Social Determinants of Health     Financial Resource Strain:     Difficulty of Paying Living Expenses: Not on file   Food Insecurity:     Worried About Running Out of Food in the Last Year: Not on file    Prasad of Food in the Last Year: Not on file   Transportation Needs:     Lack of Transportation (Medical): Not on file    Lack of Transportation (Non-Medical):  Not on file   Physical Activity:     Days of Exercise per Week: Not on file    Minutes of Exercise per Session: Not on file   Stress:     Feeling of Stress : Not on file   Social Connections:     Frequency of Communication with Friends and Family: Not on file    Frequency of Social Gatherings with Friends and Family: Not on file    Attends Rastafari Services: Not on file    Active Member of [address] [place] [address] or Organizations: Not on file    Attends Club or Organization Meetings: Not on file    Marital Status: Not on file   Intimate Partner Violence:     Fear of Current or Ex-Partner: Not on file    Emotionally Abused: Not on file    Physically Abused: Not on file    Sexually Abused: Not on file   Housing Stability:     Unable to Pay for Housing in the Last Year: Not on file    Number of [place] in the Last Year: Not on file    Unstable Housing in the Last Year: Not on file ALLERGIES: Patient has no known allergies. Review of Systems   Constitutional: Negative for chills and fever. HENT: Negative for ear pain and sore throat. Eyes: Negative for visual disturbance. Respiratory: Negative for cough and shortness of breath. Cardiovascular: Negative for chest pain. Gastrointestinal: Negative for abdominal pain. Genitourinary: Negative for flank pain. Musculoskeletal: Negative for back pain. Skin: Positive for wound. Negative for color change. Neurological: Negative for dizziness and headaches. Psychiatric/Behavioral: Negative for confusion. Vitals:    12/26/21 1606   BP: (!) 168/88   Pulse: 89   Resp: 16   Temp: 98.8 °F (37.1 °C)   SpO2: 97%            Physical Exam  Vitals and nursing note reviewed. Constitutional:       General: He is not in acute distress. Appearance: Normal appearance. He is not ill-appearing. HENT:      Head: Normocephalic and atraumatic. Jaw: There is normal jaw occlusion. Eyes:      General: Vision grossly intact. Extraocular Movements: Extraocular movements intact. Conjunctiva/sclera: Conjunctivae normal.   Neck:      Trachea: Phonation normal.   Cardiovascular:      Rate and Rhythm: Normal rate and regular rhythm. Heart sounds: Normal heart sounds. Pulmonary:      Effort: Pulmonary effort is normal.      Breath sounds: Normal breath sounds and air entry. Abdominal:      Palpations: Abdomen is soft. Tenderness: There is no abdominal tenderness. Musculoskeletal:         General: Normal range of motion. Cervical back: Normal range of motion. Skin:     General: Skin is warm and dry. Findings: Wound (1 cm open circular wound to the right anterior neck with visible packing, slight erythema and purulence seen. No fluctuance, induration, or streaking.) present. Neurological:      General: No focal deficit present.       Mental Status: He is alert and oriented to person, place, and time.   Psychiatric:         Attention and Perception: Attention normal.         Mood and Affect: Mood normal.         Speech: Speech normal.          MDM  Number of Diagnoses or Management Options  Encounter for removal of abscess packing  Visit for wound check  Diagnosis management comments: Patient is alert, afebrile, vitals stable. He was seen here 12/23 for an abscess to his neck, packing was placed. I successfully removed packing today, he does have a 1 cm wound there that will be left to heal via secondary intention. As he does not have any signs of recurrent abscess, will not replace packing. Counseled patient that he may develop a scar or keloid. Advised him to continue proper wound care, bandaging, continuation of his antibiotics, and follow-up with his primary care physician in several days for repeat wound check.    5:21 PM  Pt has been reevaluated. There are no new complaints, changes, or physical findings at this time. All results have been reviewed with patient and/or family. Medications have been reviewed w/ pt and/or family. Pt and/or family's questions have been answered. Pt and/or family expressed good understanding of the dx/tx/rx and is in agreement with plan of care. Pt instructed and agreed to f/u w/ PCP and to return to ED upon further deterioration. Pt is ready for discharge. IMPRESSION:  1. Visit for wound check    2. Encounter for removal of abscess packing        PLAN:  1. Discharge Medication List as of 12/26/2021  4:26 PM        2.    Follow-up Information     Follow up With Specialties Details Why Contact Info    Flaquito Merlos MD Internal Medicine Schedule an appointment as soon as possible for a visit on 12/31/2021 For wound re-check [place] [facility]  [address] [facility] [phone]      [address] [facility] Emergency Medicine Go to  If symptoms worsen [address] [facility]  [phone]            Return to ED if worse Procedures

## 2021-12-29 RX ORDER — ISOSORBIDE MONONITRATE 30 MG/1
TABLET, EXTENDED RELEASE ORAL
Qty: 90 TABLET | Refills: 3 | Status: SHIPPED | OUTPATIENT
Start: 2021-12-29

## 2021-12-29 RX ORDER — SPIRONOLACTONE 50 MG/1
TABLET, FILM COATED ORAL
Qty: 90 TABLET | Refills: 1 | Status: SHIPPED | OUTPATIENT
Start: 2021-12-29 | End: 2022-08-16

## 2022-02-15 RX ORDER — MINOXIDIL 10 MG/1
TABLET ORAL
Qty: 630 TABLET | Refills: 3 | Status: SHIPPED | OUTPATIENT
Start: 2022-02-15

## 2022-03-18 PROBLEM — E66.01 OBESITY, MORBID (HCC): Status: ACTIVE | Noted: 2018-04-03

## 2022-03-18 PROBLEM — E11.9 DM2 (DIABETES MELLITUS, TYPE 2) (HCC): Status: ACTIVE | Noted: 2018-04-03

## 2022-03-18 PROBLEM — I27.20 PULMONARY HYPERTENSION, MODERATE TO SEVERE (HCC): Status: ACTIVE | Noted: 2018-12-10

## 2022-03-19 PROBLEM — I51.89 GRADE II DIASTOLIC DYSFUNCTION: Status: ACTIVE | Noted: 2018-12-10

## 2022-03-19 PROBLEM — I34.0 MILD MITRAL INSUFFICIENCY: Status: ACTIVE | Noted: 2018-12-20

## 2022-07-20 ENCOUNTER — OFFICE VISIT (OUTPATIENT)
Dept: INTERNAL MEDICINE CLINIC | Age: 37
End: 2022-07-20
Payer: MEDICARE

## 2022-07-20 VITALS
WEIGHT: 274.9 LBS | HEIGHT: 67 IN | TEMPERATURE: 97.8 F | RESPIRATION RATE: 20 BRPM | BODY MASS INDEX: 43.15 KG/M2 | OXYGEN SATURATION: 97 % | DIASTOLIC BLOOD PRESSURE: 87 MMHG | SYSTOLIC BLOOD PRESSURE: 153 MMHG | HEART RATE: 78 BPM

## 2022-07-20 DIAGNOSIS — I51.89 GRADE II DIASTOLIC DYSFUNCTION: ICD-10-CM

## 2022-07-20 DIAGNOSIS — Z00.00 MEDICARE ANNUAL WELLNESS VISIT, SUBSEQUENT: Primary | ICD-10-CM

## 2022-07-20 DIAGNOSIS — I34.0 MILD MITRAL INSUFFICIENCY: ICD-10-CM

## 2022-07-20 DIAGNOSIS — E66.01 OBESITY, CLASS III, BMI 40-49.9 (MORBID OBESITY) (HCC): ICD-10-CM

## 2022-07-20 DIAGNOSIS — Z13.31 SCREENING FOR DEPRESSION: ICD-10-CM

## 2022-07-20 DIAGNOSIS — F81.9 LEARNING DISABILITY: ICD-10-CM

## 2022-07-20 DIAGNOSIS — E66.01 OBESITY, MORBID (HCC): ICD-10-CM

## 2022-07-20 DIAGNOSIS — R06.02 SHORTNESS OF BREATH: ICD-10-CM

## 2022-07-20 DIAGNOSIS — I10 HTN (HYPERTENSION), MALIGNANT: ICD-10-CM

## 2022-07-20 DIAGNOSIS — I27.20 PULMONARY HYPERTENSION, MODERATE TO SEVERE (HCC): ICD-10-CM

## 2022-07-20 DIAGNOSIS — E11.9 TYPE 2 DIABETES MELLITUS WITHOUT COMPLICATION, WITHOUT LONG-TERM CURRENT USE OF INSULIN (HCC): ICD-10-CM

## 2022-07-20 PROCEDURE — G8417 CALC BMI ABV UP PARAM F/U: HCPCS | Performed by: INTERNAL MEDICINE

## 2022-07-20 PROCEDURE — 99213 OFFICE O/P EST LOW 20 MIN: CPT | Performed by: INTERNAL MEDICINE

## 2022-07-20 PROCEDURE — G0439 PPPS, SUBSEQ VISIT: HCPCS | Performed by: INTERNAL MEDICINE

## 2022-07-20 PROCEDURE — 2022F DILAT RTA XM EVC RTNOPTHY: CPT | Performed by: INTERNAL MEDICINE

## 2022-07-20 PROCEDURE — 3046F HEMOGLOBIN A1C LEVEL >9.0%: CPT | Performed by: INTERNAL MEDICINE

## 2022-07-20 PROCEDURE — G8427 DOCREV CUR MEDS BY ELIG CLIN: HCPCS | Performed by: INTERNAL MEDICINE

## 2022-07-20 PROCEDURE — G8754 DIAS BP LESS 90: HCPCS | Performed by: INTERNAL MEDICINE

## 2022-07-20 PROCEDURE — G8753 SYS BP > OR = 140: HCPCS | Performed by: INTERNAL MEDICINE

## 2022-07-20 PROCEDURE — G8510 SCR DEP NEG, NO PLAN REQD: HCPCS | Performed by: INTERNAL MEDICINE

## 2022-07-20 RX ORDER — METOPROLOL SUCCINATE 200 MG/1
200 TABLET, EXTENDED RELEASE ORAL
Qty: 30 TABLET | Refills: 11 | Status: SHIPPED | OUTPATIENT
Start: 2022-07-20

## 2022-07-20 RX ORDER — CLONIDINE HYDROCHLORIDE 0.3 MG/1
0.6 TABLET ORAL
Qty: 60 TABLET | Refills: 11 | Status: SHIPPED | OUTPATIENT
Start: 2022-07-20

## 2022-07-20 NOTE — PROGRESS NOTES
This is the Subsequent Medicare Annual Wellness Exam, performed 12 months or more after the Initial AWV or the last Subsequent AWV    I have reviewed the patient's medical history in detail and updated the computerized patient record. Assessment/Plan   Education and counseling provided:  Are appropriate based on today's review and evaluation    1. HTN (hypertension), malignant  -     HEPATITIS C AB; Future  -      DIABETES FOOT EXAM  -     NT-PRO BNP; Future  -     URINALYSIS W/ RFLX MICROSCOPIC; Future  -     CBC WITH AUTOMATED DIFF; Future  -     METABOLIC PANEL, COMPREHENSIVE; Future  -     LIPID PANEL; Future  -     COLLECTION VENOUS BLOOD,VENIPUNCTURE  -     MICROALBUMIN, UR, RAND W/ MICROALB/CREAT RATIO; Future  -     HEMOGLOBIN A1C WITH EAG; Future  2. Obesity, Class III, BMI 40-49.9 (morbid obesity) ([facility])  3. Obesity, morbid ([facility]  Assessment & Plan:   uncontrolled, continue current medications  4. Type 2 diabetes mellitus without complication, without long-term current use of insulin ([facility]  Assessment & Plan:   unclear control, continue current medications  Orders:  -     HEPATITIS C AB; Future  -      DIABETES FOOT EXAM  -     NT-PRO BNP; Future  -     URINALYSIS W/ RFLX MICROSCOPIC; Future  -     CBC WITH AUTOMATED DIFF; Future  -     METABOLIC PANEL, COMPREHENSIVE; Future  -     LIPID PANEL; Future  -     COLLECTION VENOUS BLOOD,VENIPUNCTURE  -     MICROALBUMIN, UR, RAND W/ MICROALB/CREAT RATIO; Future  -     HEMOGLOBIN A1C WITH EAG; Future  5. Pulmonary hypertension, moderate to severe [facility])  Assessment & Plan:   monitored by specialist. No acute findings meriting change in the plan  6. Grade II diastolic dysfunction  7. Mild mitral insufficiency  8. Learning disability  9. Shortness of breath   -     NT-PRO BNP; Future  10. Medicare annual wellness visit, subsequent  11.  Screening for depression       Depression Risk Factor Screening     3 most recent PHQ Screens 7/20/2022   Little interest or pleasure in doing things Not at all   Feeling down, depressed, irritable, or hopeless Not at all   Total Score PHQ 2 0       Alcohol & Drug Abuse Risk Screen    Do you average more than 2 drinks per night or 14 drinks a week: No    On any one occasion in the past three months have you have had more than 4 drinks containing alcohol:  No          Functional Ability and Level of Safety    Hearing: Hearing is good. Activities of Daily Living: The home contains: no safety equipment. Patient does total self care      Ambulation: with no difficulty     Fall Risk:  No flowsheet data found.    Abuse Screen:  Patient is not abused       Cognitive Screening    Has your family/caregiver stated any concerns about your memory: no     Cognitive Screening: Normal - Verbal Fluency Test    Health Maintenance Due     Health Maintenance Due   Topic Date Due    Hepatitis C Screening  Never done    Eye Exam Retinal or Dilated  Never done    MICROALBUMIN Q1  01/29/2021    COVID-19 Vaccine (3 - Booster for Pfizer series) 11/24/2021    A1C test (Diabetic or Prediabetic)  12/09/2021    Lipid Screen  01/20/2022       Patient Care Team   Patient Care Team:  Jossy Cunningham MD as PCP - General (Internal Medicine Physician)  Jossy Cunningham MD as PCP - [address] [place], [place] [address] Provider    History     Patient Active Problem List   Diagnosis Code    Obesity, morbid ([facility] E66.01    HTN (hypertension), malignant I10    Blurred vision, bilateral H53.8    DM2 (diabetes mellitus, type 2) ([address] E11.9    Pulmonary hypertension, moderate to severe ([address] I27.20    Grade II diastolic dysfunction G25.40    Mild mitral insufficiency I34.0    Learning disability F81.9     Past Medical History:   Diagnosis Date    Blurred vision, bilateral     DM2 (diabetes mellitus, type 2) ([address] 04/03/2018    Grade II diastolic dysfunction 09/99/9236    HTN (hypertension), malignant     Learning disability 1991    fell hit head and thereafter could not remember according to     Morbid obesity with BMI of 45.0-49.9, adult ([facility])     Non-compliance 12/10/2018    Pulmonary hypertension, moderate to severe ([address] 12/10/2018    chip seen by par and referred adrienne lainez md      History reviewed. No pertinent surgical history. Current Outpatient Medications   Medication Sig Dispense Refill    cloNIDine HCL (CATAPRES) 0.3 mg tablet Take 2 Tablets by mouth nightly. Appointment required for further refills 60 Tablet 11    metoprolol succinate (TOPROL-XL) 200 mg XL tablet Take 1 Tablet by mouth nightly. 30 Tablet 11    minoxidiL (LONITEN) 10 mg tablet TAKE 3 TABS BY MOUTH DAILY. IN AM AND 4 TABS IN  Tablet 3    spironolactone (ALDACTONE) 50 mg tablet TAKE 1 TABLET BY MOUTH EVERY DAY 90 Tablet 1    isosorbide mononitrate ER (IMDUR) 30 mg tablet TAKE 1 TABLET BY MOUTH EVERY DAY IN THE MORNING 90 Tablet 3    metFORMIN ER (GLUCOPHAGE XR) 500 mg tablet TAKE 2 TABS BY MOUTH BEFORE BREAKFAST AND DINNER. AND 1 WITH  Tablet 3    furosemide (LASIX) 40 mg tablet TAKE 1 TABLET BY MOUTH TWICE A  Tablet 3    amLODIPine (NORVASC) 10 mg tablet TAKE 1 TABLET BY MOUTH EVERY DAY 90 Tablet 3    telmisartan (MICARDIS) 80 mg tablet TAKE 1 TABLET BY MOUTH EVERY DAY 90 Tablet 3    ferrous gluconate 324 mg (37.5 mg iron) tablet Take 1 Tab by mouth every other day. 90 Tab 11     No Known Allergies    History reviewed. No pertinent family history.   Social History     Tobacco Use    Smoking status: Former     Types: Cigarettes     Quit date: 12/14/2018     Years since quitting: 3.6    Smokeless tobacco: Never   Substance Use Topics    Alcohol use: No         Sonia Cerda

## 2022-07-20 NOTE — PATIENT INSTRUCTIONS
Medicare Wellness Visit, Male    The best way to live healthy is to have a lifestyle where you eat a well-balanced diet, exercise regularly, limit alcohol use, and quit all forms of tobacco/nicotine, if applicable. Regular preventive services are another way to keep healthy. Preventive services (vaccines, screening tests, monitoring & exams) can help personalize your care plan, which helps you manage your own care. Screening tests can find health problems at the earliest stages, when they are easiest to treat. [facility] follows the current, evidence-based guidelines published by the [facility] ([facility]) when recommending preventive services for our patients. Because we follow these guidelines, sometimes recommendations change over time as research supports it. (For example, a prostate screening blood test is no longer routinely recommended for men with no symptoms). Of course, you and your doctor may decide to screen more often for some diseases, based on your risk and co-morbidities (chronic disease you are already diagnosed with). Preventive services for you include:  - Medicare offers their members a free annual wellness visit, which is time for you and your primary care provider to discuss and plan for your preventive service needs. Take advantage of this benefit every year!  -All adults over age 72 should receive the recommended pneumonia vaccines. Current USPSTF guidelines recommend a series of two vaccines for the best pneumonia protection.   -All adults should have a flu vaccine yearly and tetanus vaccine every 10 years.  -All adults age 48 and older should receive the shingles vaccines (series of two vaccines).        -All adults age 38-68 who are overweight should have a diabetes screening test once every three years.   -Other screening tests & preventive services for persons with diabetes include: an eye exam to screen for diabetic retinopathy, a kidney function test, a foot exam, and stricter control over your cholesterol.   -Cardiovascular screening for adults with routine risk involves an electrocardiogram (ECG) at intervals determined by the provider.   -Colorectal cancer screening should be done for adults age 54-65 with no increased risk factors for colorectal cancer. There are a number of acceptable methods of screening for this type of cancer. Each test has its own benefits and drawbacks. Discuss with your provider what is most appropriate for you during your annual wellness visit. The different tests include: colonoscopy (considered the best screening method), a fecal occult blood test, a fecal DNA test, and sigmoidoscopy.  -All adults born between [facility] should be screened once for Hepatitis C.  -An Abdominal Aortic Aneurysm (AAA) Screening is recommended for men age 73-68 who has ever smoked in their lifetime.      Here is a list of your current Health Maintenance items (your personalized list of preventive services) with a due date:  Health Maintenance Due   Topic Date Due    Hepatitis C Test  Never done    Eye Exam  Never done    Albumin Urine Test  01/29/2021    COVID-19 Vaccine (3 - Booster for Pfizer series) 11/24/2021    Hemoglobin A1C    12/09/2021    Cholesterol Test   01/20/2022

## 2022-07-20 NOTE — ASSESSMENT & PLAN NOTE
monitored by specialist. No acute findings meriting change in the plan left quadricep muscle biopsy left thigh quadriceps muscle

## 2022-07-20 NOTE — PROGRESS NOTES
SPORTS MEDICINE AND PRIMARY CARE  Saqib Sun MD, [address] [facility],[address] [address]  Phone:  [phone]  Fax: [phone]       Chief Complaint   Patient presents with    Follow-up    Medication Refill   . SUBJECTIVE:    Mervin Faulkner is a 31 y.o. male Patient returns today with a known history of hypertension, obesity, type 2 diabetes, pulmonary hypertension, grade 2 diastolic dysfunction, mitral regurgitation, learning disability and is seen for evaluation. Patient returns today voicing no new complaints and is seen for evaluation. Current Outpatient Medications   Medication Sig Dispense Refill    cloNIDine HCL (CATAPRES) 0.3 mg tablet Take 2 Tablets by mouth nightly. Appointment required for further refills 60 Tablet 11    metoprolol succinate (TOPROL-XL) 200 mg XL tablet Take 1 Tablet by mouth nightly. 30 Tablet 11    minoxidiL (LONITEN) 10 mg tablet TAKE 3 TABS BY MOUTH DAILY. IN AM AND 4 TABS IN  Tablet 3    spironolactone (ALDACTONE) 50 mg tablet TAKE 1 TABLET BY MOUTH EVERY DAY 90 Tablet 1    isosorbide mononitrate ER (IMDUR) 30 mg tablet TAKE 1 TABLET BY MOUTH EVERY DAY IN THE MORNING 90 Tablet 3    metFORMIN ER (GLUCOPHAGE XR) 500 mg tablet TAKE 2 TABS BY MOUTH BEFORE BREAKFAST AND DINNER. AND 1 WITH  Tablet 3    furosemide (LASIX) 40 mg tablet TAKE 1 TABLET BY MOUTH TWICE A  Tablet 3    amLODIPine (NORVASC) 10 mg tablet TAKE 1 TABLET BY MOUTH EVERY DAY 90 Tablet 3    telmisartan (MICARDIS) 80 mg tablet TAKE 1 TABLET BY MOUTH EVERY DAY 90 Tablet 3    ferrous gluconate 324 mg (37.5 mg iron) tablet Take 1 Tab by mouth every other day.  80 Tab 11     Past Medical History:   Diagnosis Date    Blurred vision, bilateral     DM2 (diabetes mellitus, type 2) ([facility] [address] 04/03/2018    Grade II diastolic dysfunction 03/34/9279    HTN (hypertension), malignant     Learning disability 1991    fell hit head and thereafter could not remember according to gm    Morbid obesity with BMI of 45.0-49.9, adult ([facility] [address]     Non-compliance 12/10/2018    Pulmonary hypertension, moderate to severe ([facility] [address] 12/10/2018    chip seen by par and referred adrienne lainez md     History reviewed. No pertinent surgical history. No Known Allergies      REVIEW OF SYSTEMS:  General: negative for - chills or fever  ENT: negative for - headaches, nasal congestion or tinnitus  Respiratory: negative for - cough, hemoptysis, shortness of breath or wheezing  Cardiovascular : negative for - chest pain, edema, palpitations or shortness of breath  Gastrointestinal: negative for - abdominal pain, blood in stools, heartburn or nausea/vomiting  Genito-Urinary: no dysuria, trouble voiding, or hematuria  Musculoskeletal: negative for - gait disturbance, joint pain, joint stiffness or joint swelling  Neurological: no TIA or stroke symptoms  Hematologic: no bruises, no bleeding, no swollen glands  Integument: no lumps, mole changes, nail changes or rash  Endocrine: no malaise/lethargy or unexpected weight changes      Social History     Socioeconomic History    Marital status: SINGLE   Tobacco Use    Smoking status: Former     Types: Cigarettes     Quit date: 12/14/2018     Years since quitting: 3.6    Smokeless tobacco: Never   Vaping Use    Vaping Use: Never used   Substance and Sexual Activity    Alcohol use: No    Drug use: No    Sexual activity: Not Currently   Social History Narrative    Medical History: KeloidObesity    Surgical History: Denies Past Surgical History    Hospitalization/Major Diagnostic Procedure: Denies Past Hospitalization    Family History: Mother: alive 37 yrs a/w Father: alive 36 yrs a/w Sister(s): alive Brother(s): alive Grandmother: alive 64 yrs Chyrl Counts 3 brother(s) , 2 sister(s) . Social History: Alcohol Use Patient does not use alcohol. Smoking Status Patient is a current every day smoker, Received cessation    counseling on: 11/18/2013.          Marital Status: Single. Lives w ith: grandmother. Occupation/W ork: employed full time [employer] . Education/School: has highschool diploma. Hoahaoism: no.     History reviewed. No pertinent family history. OBJECTIVE:    Visit Vitals  BP (!) 153/87   Pulse 78   Temp 97.8 °F (36.6 °C) (Oral)   Resp 20   Ht 5' 7\" (1.702 m)   Wt 274 lb 14.4 oz (124.7 kg)   SpO2 97%   BMI 43.06 kg/m²     CONSTITUTIONAL: well , well nourished, appears age appropriate  EYES: perrla, eom intact  ENMT:moist mucous membranes, pharynx clear  NECK: supple. Thyroid normal  RESPIRATORY: Chest: clear bilaterally   CARDIOVASCULAR: Heart: regular rate and rhythm  GASTROINTESTINAL: Abdomen: soft, bowel sounds active  HEMATOLOGIC: no pathological lymph nodes palpated  MUSCULOSKELETAL: Extremities: no edema, pulse 1+ Foot exam reveals no lesions. Sensation is intact to fine filament and vibration. Pulses are intact. INTEGUMENT: No unusual rashes or suspicious skin lesions noted. Nails appear normal.  NEUROLOGIC: non-focal exam   MENTAL STATUS: alert and oriented, appropriate affect           ASSESSMENT:  1. Medicare annual wellness visit, subsequent    2. HTN (hypertension), malignant    3. Obesity, Class III, BMI 40-49.9 (morbid obesity) (HCC)    4. Obesity, morbid ([address]    5. Type 2 diabetes mellitus without complication, without long-term current use of insulin (HCC)    6. Pulmonary hypertension, moderate to severe (HCC)    7. Grade II diastolic dysfunction    8. Mild mitral insufficiency    9. Learning disability    10. Shortness of breath     11. Screening for depression      Patient's blood pressure is up today, but earlier today blood pressure was 132/80. He shows me the readings, which are excellent. We are assuming his blood pressures have been staying down since we last saw him. Obesity remains a concern and we encourage a heart healthy, weight reducing diet.     We will check hemoglobin A1c, as well as microalbumin, for control of diabetes. He had moderate to severe pulmonary hypertension, but [facility]. They referred him to Dr. Tr Sotelo, suggesting that the pulmonary hypertension was secondary to his cardiac issues. CT scan at that time confirmed vascular congestion. He has grade 2 diastolic dysfunction. There is a history of mild mitral regurgitation. He will be back to see me in three to four months, sooner if needed. I have discussed the diagnosis with the patient and the intended plan as seen in the  Orders. The patient understands and agees with the plan. The patient has   received an after visit summary and questions were answered concerning  future plans  Patient labs and/or xrays were reviewed  Past records were reviewed. PLAN:  .  Orders Placed This Encounter    ANNUAL DEPRESSION SCREEN 8-15 MIN    HEPATITIS C AB    NT-PRO BNP    URINALYSIS W/ RFLX MICROSCOPIC    CBC WITH AUTOMATED DIFF    METABOLIC PANEL, COMPREHENSIVE    LIPID PANEL    MICROALBUMIN, UR, RAND W/ MICROALB/CREAT RATIO    HEMOGLOBIN A1C WITH EAG    cloNIDine HCL (CATAPRES) 0.3 mg tablet    metoprolol succinate (TOPROL-XL) 200 mg XL tablet         Follow-up and Dispositions    Return in about 3 months (around 10/20/2022). ATTENTION:   This medical record was transcribed using an electronic medical records system. Although proofread, it may and can contain electronic and spelling errors. Other human spelling and other errors may be present. Corrections may be executed at a later time. Please feel free to contact us for any clarifications as needed.

## 2022-07-20 NOTE — PROGRESS NOTES
Identified pt with two pt identifiers(name and ). Reviewed record in preparation for visit and have obtained necessary documentation. Chief Complaint   Patient presents with    Follow-up    Medication Refill        Health Maintenance Due   Topic    Hepatitis C Screening     Pneumococcal 0-64 years (1 - PCV)    Eye Exam Retinal or Dilated     Foot Exam Q1     MICROALBUMIN Q1     Medicare Yearly Exam     COVID-19 Vaccine (3 - Booster for Pfizer series)    A1C test (Diabetic or Prediabetic)     Depression Screen     Lipid Screen      Vitals:    22 1618   BP: (!) 163/93   Pulse: 78   Resp: 20   Temp: 97.8 °F (36.6 °C)   TempSrc: Oral   SpO2: 97%   Weight: 274 lb 14.4 oz (124.7 kg)   Height: 5' 7\" (1.702 m)   PainSc:   0 - No pain       Pain Scale: 0 - No pain/10        Coordination of Care Questionnaire:  :     1. Have you been to the ER, urgent care clinic since your last visit? Hospitalized since your last visit? Yes- [facility] bump on neck- pt can't remember when    2. Have you seen or consulted any other health care providers outside of the [address] [place] [address] since your last visit? Include any pap smears or colon screening. No    3. For patients aged 39-70: Has the patient had a colonoscopy / FIT/ Cologuard? NA - based on age      If the patient is female:    4. For patients aged 41-77: Has the patient had a mammogram within the past 2 years? NA - based on age or sex      11. For patients aged 21-65: Has the patient had a pap smear?  NA - based on age or sex

## 2022-07-21 LAB
ALBUMIN SERPL-MCNC: 4.4 G/DL (ref 3.5–5)
ALBUMIN/GLOB SERPL: 1 {RATIO} (ref 1.1–2.2)
ALP SERPL-CCNC: 92 U/L (ref 45–117)
ALT SERPL-CCNC: 23 U/L (ref 12–78)
ANION GAP SERPL CALC-SCNC: 8 MMOL/L (ref 5–15)
APPEARANCE UR: CLEAR
AST SERPL-CCNC: 13 U/L (ref 15–37)
BACTERIA URNS QL MICRO: NEGATIVE /HPF
BASOPHILS # BLD: 0 K/UL (ref 0–0.1)
BASOPHILS NFR BLD: 1 % (ref 0–1)
BILIRUB SERPL-MCNC: 0.7 MG/DL (ref 0.2–1)
BILIRUB UR QL: NEGATIVE
BNP SERPL-MCNC: 57 PG/ML
BUN SERPL-MCNC: 12 MG/DL (ref 6–20)
BUN/CREAT SERPL: 8 (ref 12–20)
CALCIUM SERPL-MCNC: 9.6 MG/DL (ref 8.5–10.1)
CHLORIDE SERPL-SCNC: 104 MMOL/L (ref 97–108)
CHOLEST SERPL-MCNC: 194 MG/DL
CO2 SERPL-SCNC: 24 MMOL/L (ref 21–32)
COLOR UR: ABNORMAL
COMMENT, HOLDF: NORMAL
COMMENT, HOLDF: NORMAL
CREAT SERPL-MCNC: 1.5 MG/DL (ref 0.7–1.3)
CREAT UR-MCNC: 111 MG/DL
DIFFERENTIAL METHOD BLD: ABNORMAL
EOSINOPHIL # BLD: 0.2 K/UL (ref 0–0.4)
EOSINOPHIL NFR BLD: 3 % (ref 0–7)
EPITH CASTS URNS QL MICRO: ABNORMAL /LPF
ERYTHROCYTE [DISTWIDTH] IN BLOOD BY AUTOMATED COUNT: 13.5 % (ref 11.5–14.5)
EST. AVERAGE GLUCOSE BLD GHB EST-MCNC: 137 MG/DL
GLOBULIN SER CALC-MCNC: 4.5 G/DL (ref 2–4)
GLUCOSE SERPL-MCNC: 114 MG/DL (ref 65–100)
GLUCOSE UR STRIP.AUTO-MCNC: NEGATIVE MG/DL
HBA1C MFR BLD: 6.4 % (ref 4–5.6)
HCT VFR BLD AUTO: 43.3 % (ref 36.6–50.3)
HCV AB SERPL QL IA: NONREACTIVE
HDLC SERPL-MCNC: 72 MG/DL
HDLC SERPL: 2.7 {RATIO} (ref 0–5)
HGB BLD-MCNC: 14.6 G/DL (ref 12.1–17)
HGB UR QL STRIP: NEGATIVE
HYALINE CASTS URNS QL MICRO: ABNORMAL /LPF (ref 0–5)
IMM GRANULOCYTES # BLD AUTO: 0 K/UL (ref 0–0.04)
IMM GRANULOCYTES NFR BLD AUTO: 1 % (ref 0–0.5)
KETONES UR QL STRIP.AUTO: NEGATIVE MG/DL
LDLC SERPL CALC-MCNC: 110.6 MG/DL (ref 0–100)
LEUKOCYTE ESTERASE UR QL STRIP.AUTO: NEGATIVE
LYMPHOCYTES # BLD: 2.4 K/UL (ref 0.8–3.5)
LYMPHOCYTES NFR BLD: 38 % (ref 12–49)
MCH RBC QN AUTO: 28.6 PG (ref 26–34)
MCHC RBC AUTO-ENTMCNC: 33.7 G/DL (ref 30–36.5)
MCV RBC AUTO: 84.7 FL (ref 80–99)
MICROALBUMIN UR-MCNC: 12.8 MG/DL
MICROALBUMIN/CREAT UR-RTO: 115 MG/G (ref 0–30)
MONOCYTES # BLD: 0.8 K/UL (ref 0–1)
MONOCYTES NFR BLD: 13 % (ref 5–13)
NEUTS SEG # BLD: 2.9 K/UL (ref 1.8–8)
NEUTS SEG NFR BLD: 44 % (ref 32–75)
NITRITE UR QL STRIP.AUTO: NEGATIVE
NRBC # BLD: 0 K/UL (ref 0–0.01)
NRBC BLD-RTO: 0 PER 100 WBC
PH UR STRIP: 5.5 [PH] (ref 5–8)
PLATELET # BLD AUTO: 266 K/UL (ref 150–400)
PMV BLD AUTO: 10.6 FL (ref 8.9–12.9)
POTASSIUM SERPL-SCNC: 4 MMOL/L (ref 3.5–5.1)
PROT SERPL-MCNC: 8.9 G/DL (ref 6.4–8.2)
PROT UR STRIP-MCNC: ABNORMAL MG/DL
RBC # BLD AUTO: 5.11 M/UL (ref 4.1–5.7)
RBC #/AREA URNS HPF: ABNORMAL /HPF (ref 0–5)
SAMPLES BEING HELD,HOLD: NORMAL
SAMPLES BEING HELD,HOLD: NORMAL
SODIUM SERPL-SCNC: 136 MMOL/L (ref 136–145)
SP GR UR REFRACTOMETRY: 1.01 (ref 1–1.03)
TRIGL SERPL-MCNC: 57 MG/DL (ref ?–150)
UROBILINOGEN UR QL STRIP.AUTO: 0.2 EU/DL (ref 0.2–1)
VLDLC SERPL CALC-MCNC: 11.4 MG/DL
WBC # BLD AUTO: 6.3 K/UL (ref 4.1–11.1)
WBC URNS QL MICRO: ABNORMAL /HPF (ref 0–4)

## 2022-07-22 ENCOUNTER — PATIENT OUTREACH (OUTPATIENT)
Dept: CASE MANAGEMENT | Age: 37
End: 2022-07-22

## 2022-07-22 RX ORDER — AMLODIPINE BESYLATE 10 MG/1
TABLET ORAL
Qty: 90 TABLET | Refills: 3 | Status: SHIPPED | OUTPATIENT
Start: 2022-07-22

## 2022-07-22 RX ORDER — FUROSEMIDE 40 MG/1
TABLET ORAL
Qty: 90 TABLET | Refills: 3 | Status: SHIPPED | OUTPATIENT
Start: 2022-07-22

## 2022-07-22 RX ORDER — FERROUS GLUCONATE 324(38)MG
TABLET ORAL
Qty: 45 TABLET | Refills: 3 | Status: SHIPPED | OUTPATIENT
Start: 2022-07-22

## 2022-07-22 RX ORDER — METFORMIN HYDROCHLORIDE 500 MG/1
1000 TABLET, EXTENDED RELEASE ORAL
Qty: 360 TABLET | Refills: 3 | Status: SHIPPED | OUTPATIENT
Start: 2022-07-22

## 2022-07-22 RX ORDER — METOPROLOL SUCCINATE 100 MG/1
TABLET, EXTENDED RELEASE ORAL
Qty: 45 TABLET | Refills: 0 | OUTPATIENT
Start: 2022-07-22

## 2022-07-22 NOTE — PROGRESS NOTES
Ambulatory Care Management Note    Date/Time:  7/22/2022 2:15 PM    This patient was received as a referral from [employer]. Ambulatory Care Manager outreached to patient today to offer care management services. Introduction to self and role of care manager provided. Patient accepted care management services at this time. Follow up call scheduled at this time. Patient has Ambulatory Care Manager's contact number for for any questions or concerns.

## 2022-08-16 RX ORDER — SPIRONOLACTONE 50 MG/1
TABLET, FILM COATED ORAL
Qty: 90 TABLET | Refills: 1 | Status: SHIPPED | OUTPATIENT
Start: 2022-08-16

## 2022-08-31 ENCOUNTER — PATIENT OUTREACH (OUTPATIENT)
Dept: CASE MANAGEMENT | Age: 37
End: 2022-08-31

## 2022-08-31 NOTE — PROGRESS NOTES
CM:  Diastolic Dysfunction HF/HTN/type 2 Diabetes    Patient on Complex CM Enrollment Report/fu Contact. Left message on voice mail with my contact information for return call. Need to assess for ongoing needs and CCM enrollment/fu.

## 2022-09-02 ENCOUNTER — PATIENT OUTREACH (OUTPATIENT)
Dept: CASE MANAGEMENT | Age: 37
End: 2022-09-02

## 2022-09-02 NOTE — PROGRESS NOTES
Ambulatory Care Management Note    Date/Time:  9/2/2022 8:53 AM    This Ambulatory Care Manager (ACM) reviewed and updated the following screenings during this call; self management assessment    Patient's challenges to self-management identified:   medical condition      Medication Management:  good adherence    Advance Care Planning:   Does patient have an Advance Directive:  currently not on file; education provided   [facility]. Decision Maker. [employer], Referrals, and Durable Medical Equipment: none      Health Maintenance Due   Topic Date Due    Eye Exam Retinal or Dilated  Never done    COVID-19 Vaccine (3 - Booster for Pfizer series) 11/24/2021    Flu Vaccine (1) 09/01/2022     Health Maintenance Reviewed: COVID booster update in system    Patient was asked to consider health care goals that they would like to focus on with this ACM. ACM will follow up with patient to discuss goals and establish care plan in the next 7-14 days.        PCP/Specialist follow up:   Future Appointments   Date Time Provider Rich Cr   11/23/2022  4:00 PM Denae Boston MD [facility] MAIN BS AMB

## 2022-09-02 NOTE — ACP (ADVANCE CARE PLANNING)
Advance Care Planning:   Does patient have an Advance Directive:  currently not on file; education provided   Ryann garsia [facility] ([place] [place] OF [facility]. Decision Maker.

## 2022-10-11 ENCOUNTER — PATIENT OUTREACH (OUTPATIENT)
Dept: CASE MANAGEMENT | Age: 37
End: 2022-10-11

## 2022-10-11 NOTE — PROGRESS NOTES
CM:  Diastolic Dysfunction HF/HTN/type 2 Diabetes (s/p TERRANCE Handoff)    Patient on Complex CM Enrollment Report/fu Contact. Left message on voice mail with my contact information for return call. Need to assess for ongoing needs and CCM enrollment/fu.

## 2022-10-31 ENCOUNTER — PATIENT OUTREACH (OUTPATIENT)
Dept: CASE MANAGEMENT | Age: 37
End: 2022-10-31

## 2022-12-07 ENCOUNTER — PATIENT OUTREACH (OUTPATIENT)
Dept: CASE MANAGEMENT | Age: 37
End: 2022-12-07

## 2022-12-07 NOTE — PROGRESS NOTES
Patient has graduated from the Complex Case Management  program on 12/7/2022. Patient/family has the ability to self-manage at this time. Care management goals have been completed. No further Ambulatory Care Manager follow up scheduled. Goals Addressed                   This Visit's Progress     Self-schedules and keeps appointments    On track     12/7/2022:  Assistance proved for next appt date:  patient missed last 10/22/2022 appointment:  Patient works odd shifts;  advised to ask for early AM shift(approved by PCP only) or Fridays (his day off). Agrees to schedule today. EW ACM              Patient has Ambulatory Care Manager's contact information for any further questions, concerns, or needs.   Patients upcoming visits:    Future Appointments   Date Time Provider Rich Cr   12/15/2022 12:45 PM Malina Zavala MD [facility] MOHAN BS AMB

## 2022-12-07 NOTE — PROGRESS NOTES
Heart Failure Education outreach Date/Time: 2022 2:22 PM    Ambulatory Care Manager (ACM) contacted the patient by telephone to perform Ambulatory Care Coordination. Verified name and  with patient as identifiers. Provided introduction to self, and explanation of the Ambulatory Care Manager's role. ACM reviewed that a Health Healthy tips for the Holiday packet has been mailed to the them. ACM reviewed CHF zones, daily weights, fluid restriction, the importance of low sodium diet with the patient. Instructed patient to call their PCP if they have a weight gain of 3 lbs in 2 days or 5 lbs in a week. Patient reminded that there is a physician on call 24 hours a day / 7 days a week should the patient have questions or concerns. The patient verbalized understanding. Patient missed last appointment:  Patient works odd shifts;  advised to ask for early AM shift(approved by PCP only) or  (his day off). Ambulatory Care Management Note    This patient was received as a referral from [employer]. Top Challenges reviewed with the provider   Patient missed 10/22 PCP appointment;  reports working different hours and doesn't get off until 8PM/starts at 5 or 10AM.  Advised to schedule on -only time off. Ambulatory  contacted patient for discussion and case management of Obestiy/BP/HF: Pulm HTN. Summary of patients top problems: Patient has HF: Pulmonary HTN which is treated with long term medication. Report /72 today. Patient is placed on CHF-Tuck-in Program today. Patient to received education/follow-up re weight diet/BP management. Patient's challenges to self management identified:   medical condition      Medication Management:  good adherence    Advance Care Planning:   Does patient have an Advance Directive:  currently not on file; education provided Relative, Desiree Mack remains unchanged as [facility] [place]. Decision Maker. Referral Placed. [employer], Referrals, and Durable Medical Equipment: Cardiology. ACP. PCP/Specialist follow up:   Future Appointments   Date Time Provider Rich Shaye   12/15/2022 12:45 PM Duong Collins MD [facility] MAIN BS AMB           Goals        Self-schedules and keeps appointments       12/7/2022:  Assistance proved for next appt date:  patient missed last 10/22/2022 appointment:  Patient works odd shifts;  advised to ask for early AM shift(approved by PCP only) or Fridays (his day off). Agrees to schedule today. EW ACM               Patient verbalized understanding of all information discussed. Patient has this Ambulatory Care Manager's contact information for any further questions, concerns, or needs.

## 2022-12-07 NOTE — ACP (ADVANCE CARE PLANNING)
Advance Care Planning:   Does patient have an Advance Directive:  currently not on file; education provided Relative, Marce Vicente remains unchanged as [facility] OF [facility]. Decision Maker. Referral Placed.

## 2022-12-12 ENCOUNTER — PATIENT OUTREACH (OUTPATIENT)
Dept: CASE MANAGEMENT | Age: 37
End: 2022-12-12

## 2022-12-14 ENCOUNTER — PATIENT OUTREACH (OUTPATIENT)
Dept: CASE MANAGEMENT | Age: 37
End: 2022-12-14

## 2022-12-15 ENCOUNTER — OFFICE VISIT (OUTPATIENT)
Dept: INTERNAL MEDICINE CLINIC | Age: 37
End: 2022-12-15
Payer: MEDICARE

## 2022-12-15 VITALS
RESPIRATION RATE: 16 BRPM | OXYGEN SATURATION: 99 % | SYSTOLIC BLOOD PRESSURE: 134 MMHG | WEIGHT: 264 LBS | DIASTOLIC BLOOD PRESSURE: 66 MMHG | BODY MASS INDEX: 41.44 KG/M2 | HEIGHT: 67 IN | TEMPERATURE: 97.9 F | HEART RATE: 87 BPM

## 2022-12-15 DIAGNOSIS — E11.9 TYPE 2 DIABETES MELLITUS WITHOUT COMPLICATION, WITHOUT LONG-TERM CURRENT USE OF INSULIN (HCC): Primary | ICD-10-CM

## 2022-12-15 DIAGNOSIS — E66.01 OBESITY, MORBID (HCC): ICD-10-CM

## 2022-12-15 DIAGNOSIS — F81.9 LEARNING DISABILITY: ICD-10-CM

## 2022-12-15 DIAGNOSIS — I27.20 PULMONARY HYPERTENSION, MODERATE TO SEVERE (HCC): ICD-10-CM

## 2022-12-15 DIAGNOSIS — I10 HTN (HYPERTENSION), MALIGNANT: ICD-10-CM

## 2022-12-15 DIAGNOSIS — R06.02 SOB (SHORTNESS OF BREATH): ICD-10-CM

## 2022-12-15 DIAGNOSIS — I51.89 GRADE II DIASTOLIC DYSFUNCTION: ICD-10-CM

## 2022-12-15 DIAGNOSIS — Z23 NEEDS FLU SHOT: ICD-10-CM

## 2022-12-15 PROCEDURE — 3078F DIAST BP <80 MM HG: CPT | Performed by: INTERNAL MEDICINE

## 2022-12-15 PROCEDURE — 99214 OFFICE O/P EST MOD 30 MIN: CPT | Performed by: INTERNAL MEDICINE

## 2022-12-15 PROCEDURE — G8427 DOCREV CUR MEDS BY ELIG CLIN: HCPCS | Performed by: INTERNAL MEDICINE

## 2022-12-15 PROCEDURE — 3044F HG A1C LEVEL LT 7.0%: CPT | Performed by: INTERNAL MEDICINE

## 2022-12-15 PROCEDURE — G8754 DIAS BP LESS 90: HCPCS | Performed by: INTERNAL MEDICINE

## 2022-12-15 PROCEDURE — G8752 SYS BP LESS 140: HCPCS | Performed by: INTERNAL MEDICINE

## 2022-12-15 PROCEDURE — 3075F SYST BP GE 130 - 139MM HG: CPT | Performed by: INTERNAL MEDICINE

## 2022-12-15 PROCEDURE — 90686 IIV4 VACC NO PRSV 0.5 ML IM: CPT | Performed by: INTERNAL MEDICINE

## 2022-12-15 PROCEDURE — 2022F DILAT RTA XM EVC RTNOPTHY: CPT | Performed by: INTERNAL MEDICINE

## 2022-12-15 PROCEDURE — G0008 ADMIN INFLUENZA VIRUS VAC: HCPCS | Performed by: INTERNAL MEDICINE

## 2022-12-15 PROCEDURE — G8432 DEP SCR NOT DOC, RNG: HCPCS | Performed by: INTERNAL MEDICINE

## 2022-12-15 PROCEDURE — G8417 CALC BMI ABV UP PARAM F/U: HCPCS | Performed by: INTERNAL MEDICINE

## 2022-12-15 RX ORDER — SPIRONOLACTONE 50 MG/1
50 TABLET, FILM COATED ORAL DAILY
Qty: 90 TABLET | Refills: 3 | Status: SHIPPED | OUTPATIENT
Start: 2022-12-15

## 2022-12-15 RX ORDER — ATORVASTATIN CALCIUM 10 MG/1
10 TABLET, FILM COATED ORAL
COMMUNITY

## 2022-12-15 RX ORDER — FUROSEMIDE 40 MG/1
40 TABLET ORAL DAILY
Qty: 90 TABLET | Refills: 3 | Status: SHIPPED | OUTPATIENT
Start: 2022-12-15

## 2022-12-15 RX ORDER — FERROUS GLUCONATE 324(38)MG
TABLET ORAL
Qty: 45 TABLET | Refills: 3 | Status: SHIPPED | OUTPATIENT
Start: 2022-12-15

## 2022-12-15 NOTE — PROGRESS NOTES
Rm 3    Chief Complaint   Patient presents with    Follow-up     BP, diabetes     1. Have you been to the ER, urgent care clinic since your last visit? Hospitalized since your last visit? No    2. Have you seen or consulted any other health care providers outside of the [address] [place] [address] since your last visit? Include any pap smears or colon screening.  No    Visit Vitals  /66 (BP 1 Location: Left upper arm, BP Patient Position: Sitting, BP Cuff Size: Large adult)   Pulse 87   Temp 97.9 °F (36.6 °C) (Oral)   Resp 16   Ht 5' 7\" (1.702 m)   Wt 264 lb (119.7 kg)   SpO2 99%   BMI 41.35 kg/m²

## 2022-12-15 NOTE — PROGRESS NOTES
SPORTS MEDICINE AND PRIMARY CARE  Aleksander Rea MD, [address] [address]  Phone:  [phone]  Fax: [phone]       Chief Complaint   Patient presents with    Follow-up     BP, diabetes   . SUBJECTIVE:    Geneva Vera is a 30 y.o. male Patient returns today with a known history of type 2 diabetes, moderate to severe pulmonary hypertension, morbid obesity, learning disability, grade 2 diastolic dysfunction, hypertension, and is seen for evaluation. Patient returns today voicing no new complaints. Blood pressure at home is in the 130-140 range. He feels well, no swelling, no shortness of breath, and he is seen for evaluation. Current Outpatient Medications   Medication Sig Dispense Refill    atorvastatin (LIPITOR) 10 mg tablet 10 mg.      furosemide (LASIX) 40 mg tablet Take 1 Tablet by mouth daily. 90 Tablet 3    ferrous gluconate 324 mg (38 mg iron) tablet TAKE 1 TABLET BY MOUTH EVERY OTHER DAY 45 Tablet 3    spironolactone (ALDACTONE) 50 mg tablet Take 1 Tablet by mouth daily. 90 Tablet 3    metFORMIN ER (GLUCOPHAGE XR) 500 mg tablet TAKE 2 TABS BY MOUTH BEFORE BREAKFAST AND DINNER. AND 1 WITH  Tablet 3    amLODIPine (NORVASC) 10 mg tablet TAKE 1 TABLET BY MOUTH EVERY DAY 90 Tablet 3    cloNIDine HCL (CATAPRES) 0.3 mg tablet Take 2 Tablets by mouth nightly. Appointment required for further refills 60 Tablet 11    metoprolol succinate (TOPROL-XL) 200 mg XL tablet Take 1 Tablet by mouth nightly. 30 Tablet 11    minoxidiL (LONITEN) 10 mg tablet TAKE 3 TABS BY MOUTH DAILY.  IN AM AND 4 TABS IN  Tablet 3    isosorbide mononitrate ER (IMDUR) 30 mg tablet TAKE 1 TABLET BY MOUTH EVERY DAY IN THE MORNING 90 Tablet 3    telmisartan (MICARDIS) 80 mg tablet TAKE 1 TABLET BY MOUTH EVERY DAY 90 Tablet 3     Past Medical History:   Diagnosis Date    Blurred vision, bilateral     [facility]di[facility] 04/03/2018    Grade II diastolic dysfunction 12/10/2018    HTN (hypertension), malignant     Learning disability     fell hit head and thereafter could not remember according to     Morbid obesity with BMI of 45.0-49.9, adult ([facility]     Non-compliance 12/10/2018    Pulmonary hypertension, moderate to severe ([facility] 12/10/2018    chip seen by par and referred adrienne lainez md     History reviewed. No pertinent surgical history. No Known Allergies      REVIEW OF SYSTEMS:  General: negative for - chills or fever  ENT: negative for - headaches, nasal congestion or tinnitus  Respiratory: negative for - cough, hemoptysis, shortness of breath or wheezing  Cardiovascular : negative for - chest pain, edema, palpitations or shortness of breath  Gastrointestinal: negative for - abdominal pain, blood in stools, heartburn or nausea/vomiting  Genito-Urinary: no dysuria, trouble voiding, or hematuria  Musculoskeletal: negative for - gait disturbance, joint pain, joint stiffness or joint swelling  Neurological: no TIA or stroke symptoms  Hematologic: no bruises, no bleeding, no swollen glands  Integument: no lumps, mole changes, nail changes or rash  Endocrine: no malaise/lethargy or unexpected weight changes      Social History     Socioeconomic History    Marital status: SINGLE   Tobacco Use    Smoking status: Former     Types: Cigarettes     Quit date: 2018     Years since quittin.0    Smokeless tobacco: Never   Vaping Use    Vaping Use: Never used   Substance and Sexual Activity    Alcohol use: No    Drug use: No    Sexual activity: Not Currently   Social History Narrative    Medical History: KeloidObesity    Surgical History: Denies Past Surgical History    Hospitalization/Major Diagnostic Procedure: Denies Past Hospitalization    Family History: Mother: alive 37 yrs a/w Father: alive 36 yrs a/w Sister(s): alive Brother(s): alive Grandmother: alive 64 yrs Michelle Latisha 3 brother(s) , 2 sister(s) .          Social History: Alcohol Use Patient does not use alcohol. Smoking Status Patient is a current every day smoker, Received cessation    counseling on: 11/18/2013. Marital Status: Single. Lives w ith: grandmother. Occupation/W ork: employed full time [employer] . Education/School: has highschool diploma. Jainism: no.     Social Determinants of Health     Financial Resource Strain: Low Risk     Difficulty of Paying Living Expenses: Not hard at all   Food Insecurity: No Food Insecurity    Worried About [address] [employer] in the Last Year: Never true    Ran Out of Food in the Last Year: Never true   Transportation Needs: No Transportation Needs    Lack of Transportation (Medical): No    Lack of Transportation (Non-Medical): No   Physical Activity: Inactive    Days of Exercise per Week: 0 days    Minutes of Exercise per Session: 0 min   Housing Stability: Low Risk     Unable to Pay for Housing in the Last Year: No    Number of Places Lived in the Last Year: 1    Unstable Housing in the Last Year: No     History reviewed. No pertinent family history. OBJECTIVE:    Visit Vitals  /66 (BP 1 Location: Left upper arm, BP Patient Position: Sitting, BP Cuff Size: Large adult)   Pulse 87   Temp 97.9 °F (36.6 °C) (Oral)   Resp 16   Ht 5' 7\" (1.702 m)   Wt 264 lb (119.7 kg)   SpO2 99%   BMI 41.35 kg/m²     CONSTITUTIONAL: well , well nourished, appears age appropriate  EYES: perrla, eom intact  ENMT:moist mucous membranes, pharynx clear  NECK: supple. Thyroid normal  RESPIRATORY: Chest: clear bilaterally   CARDIOVASCULAR: Heart: regular rate and rhythm  GASTROINTESTINAL: Abdomen: soft, bowel sounds active  HEMATOLOGIC: no pathological lymph nodes palpated  MUSCULOSKELETAL: Extremities: no edema, pulse 1+   INTEGUMENT: No unusual rashes or suspicious skin lesions noted. Nails appear normal.  NEUROLOGIC: non-focal exam   MENTAL STATUS: alert and oriented, appropriate affect           ASSESSMENT:  1.  Type 2 diabetes mellitus without complication, without long-term current use of insulin ([address]    2. Needs flu shot    3. Pulmonary hypertension, moderate to severe (HCC)    4. Obesity, morbid ([address]    5. Learning disability    6. Grade II diastolic dysfunction    7. HTN (hypertension), malignant    8. SOB (shortness of breath)      He is doing surprisingly well. We will check hemoglobin A1c for control of his diabetes. Pulmonary hypertension, although markedly severe, I suspect is improving since it was felt to be related to his heart. Morbid obesity remains a concern, but he has lost 10 pounds and we encourage him to continue weight reduction. Grade 2 diastolic dysfunction without shortness of breath currently. Blood pressure control is excellent. He will be back to see me in four months, sooner if he has any problems. I have discussed the diagnosis with the patient and the intended plan as seen in the  Orders. The patient understands and agees with the plan. The patient has   received an after visit summary and questions were answered concerning  future plans  Patient labs and/or xrays were reviewed  Past records were reviewed. PLAN:  .  Orders Placed This Encounter    Influenza, FLUARIX, FLULAVAL, Fluzone (age 10 mo+), AFLURIA (age 3y+) IM, PF, 0.5 mL    RENAL FUNCTION PANEL    HEMOGLOBIN A1C WITH EAG    NT-PRO BNP    atorvastatin (LIPITOR) 10 mg tablet    furosemide (LASIX) 40 mg tablet    ferrous gluconate 324 mg (38 mg iron) tablet    spironolactone (ALDACTONE) 50 mg tablet       Follow-up and Dispositions    Return in about 4 months (around 4/15/2023). ATTENTION:   This medical record was transcribed using an electronic medical records system. Although proofread, it may and can contain electronic and spelling errors. Other human spelling and other errors may be present. Corrections may be executed at a later time. Please feel free to contact us for any clarifications as needed.

## 2022-12-16 LAB
ALBUMIN SERPL-MCNC: 4.3 G/DL (ref 3.5–5)
ANION GAP SERPL CALC-SCNC: 5 MMOL/L (ref 5–15)
BNP SERPL-MCNC: 151 PG/ML
BUN SERPL-MCNC: 20 MG/DL (ref 6–20)
BUN/CREAT SERPL: 14 (ref 12–20)
CALCIUM SERPL-MCNC: 9.7 MG/DL (ref 8.5–10.1)
CHLORIDE SERPL-SCNC: 109 MMOL/L (ref 97–108)
CO2 SERPL-SCNC: 27 MMOL/L (ref 21–32)
CREAT SERPL-MCNC: 1.39 MG/DL (ref 0.7–1.3)
EST. AVERAGE GLUCOSE BLD GHB EST-MCNC: 134 MG/DL
GLUCOSE SERPL-MCNC: 107 MG/DL (ref 65–100)
HBA1C MFR BLD: 6.3 % (ref 4–5.6)
PHOSPHATE SERPL-MCNC: 3.7 MG/DL (ref 2.6–4.7)
POTASSIUM SERPL-SCNC: 4.1 MMOL/L (ref 3.5–5.1)
SODIUM SERPL-SCNC: 141 MMOL/L (ref 136–145)

## 2023-03-05 RX ORDER — MINOXIDIL 10 MG/1
TABLET ORAL
Qty: 630 TABLET | Refills: 3 | Status: SHIPPED | OUTPATIENT
Start: 2023-03-05

## 2023-04-17 ENCOUNTER — OFFICE VISIT (OUTPATIENT)
Dept: INTERNAL MEDICINE CLINIC | Age: 38
End: 2023-04-17
Payer: MEDICARE

## 2023-04-17 VITALS
SYSTOLIC BLOOD PRESSURE: 149 MMHG | OXYGEN SATURATION: 97 % | DIASTOLIC BLOOD PRESSURE: 79 MMHG | TEMPERATURE: 97.9 F | RESPIRATION RATE: 18 BRPM | HEART RATE: 85 BPM | WEIGHT: 265.5 LBS | HEIGHT: 67 IN | BODY MASS INDEX: 41.67 KG/M2

## 2023-04-17 DIAGNOSIS — I27.20 PULMONARY HYPERTENSION, MODERATE TO SEVERE (HCC): ICD-10-CM

## 2023-04-17 DIAGNOSIS — R06.02 SOB (SHORTNESS OF BREATH): ICD-10-CM

## 2023-04-17 DIAGNOSIS — I51.89 GRADE II DIASTOLIC DYSFUNCTION: Primary | ICD-10-CM

## 2023-04-17 DIAGNOSIS — E66.01 OBESITY, MORBID (HCC): ICD-10-CM

## 2023-04-17 DIAGNOSIS — I10 HTN (HYPERTENSION), MALIGNANT: ICD-10-CM

## 2023-04-17 DIAGNOSIS — E78.5 DYSLIPIDEMIA: ICD-10-CM

## 2023-04-17 DIAGNOSIS — E11.9 TYPE 2 DIABETES MELLITUS WITHOUT COMPLICATION, WITHOUT LONG-TERM CURRENT USE OF INSULIN (HCC): ICD-10-CM

## 2023-04-17 LAB
ALBUMIN SERPL-MCNC: 4.4 G/DL (ref 3.5–5)
ANION GAP SERPL CALC-SCNC: 4 MMOL/L (ref 5–15)
BUN SERPL-MCNC: 13 MG/DL (ref 6–20)
BUN/CREAT SERPL: 9 (ref 12–20)
CALCIUM SERPL-MCNC: 9.7 MG/DL (ref 8.5–10.1)
CHLORIDE SERPL-SCNC: 104 MMOL/L (ref 97–108)
CHOLEST SERPL-MCNC: 181 MG/DL
CO2 SERPL-SCNC: 29 MMOL/L (ref 21–32)
CREAT SERPL-MCNC: 1.49 MG/DL (ref 0.7–1.3)
EST. AVERAGE GLUCOSE BLD GHB EST-MCNC: 134 MG/DL
GLUCOSE SERPL-MCNC: 112 MG/DL (ref 65–100)
HBA1C MFR BLD: 6.3 % (ref 4–5.6)
HDLC SERPL-MCNC: 63 MG/DL
HDLC SERPL: 2.9 (ref 0–5)
LDLC SERPL CALC-MCNC: 106.6 MG/DL (ref 0–100)
PHOSPHATE SERPL-MCNC: 3.6 MG/DL (ref 2.6–4.7)
POTASSIUM SERPL-SCNC: 4.3 MMOL/L (ref 3.5–5.1)
SODIUM SERPL-SCNC: 137 MMOL/L (ref 136–145)
TRIGL SERPL-MCNC: 57 MG/DL (ref ?–150)
VLDLC SERPL CALC-MCNC: 11.4 MG/DL

## 2023-04-17 PROCEDURE — G8510 SCR DEP NEG, NO PLAN REQD: HCPCS | Performed by: INTERNAL MEDICINE

## 2023-04-17 PROCEDURE — 3078F DIAST BP <80 MM HG: CPT | Performed by: INTERNAL MEDICINE

## 2023-04-17 PROCEDURE — 99214 OFFICE O/P EST MOD 30 MIN: CPT | Performed by: INTERNAL MEDICINE

## 2023-04-17 PROCEDURE — G8417 CALC BMI ABV UP PARAM F/U: HCPCS | Performed by: INTERNAL MEDICINE

## 2023-04-17 PROCEDURE — 2022F DILAT RTA XM EVC RTNOPTHY: CPT | Performed by: INTERNAL MEDICINE

## 2023-04-17 PROCEDURE — 3077F SYST BP >= 140 MM HG: CPT | Performed by: INTERNAL MEDICINE

## 2023-04-17 PROCEDURE — 3046F HEMOGLOBIN A1C LEVEL >9.0%: CPT | Performed by: INTERNAL MEDICINE

## 2023-04-17 PROCEDURE — G8427 DOCREV CUR MEDS BY ELIG CLIN: HCPCS | Performed by: INTERNAL MEDICINE

## 2023-04-17 RX ORDER — MINOXIDIL 10 MG/1
40 TABLET ORAL 2 TIMES DAILY
Qty: 320 TABLET | Refills: 5 | Status: SHIPPED | OUTPATIENT
Start: 2023-04-17

## 2023-04-17 RX ORDER — CLONIDINE HYDROCHLORIDE 0.3 MG/1
0.6 TABLET ORAL
Qty: 60 TABLET | Refills: 11 | Status: SHIPPED | OUTPATIENT
Start: 2023-04-17

## 2023-04-17 NOTE — PROGRESS NOTES
SPORTS MEDICINE AND PRIMARY CARE  Flex Siu MD, [address] [address]  Phone:  [phone]  Fax: [phone]       Chief Complaint   Patient presents with    Diabetes    Hypertension   . SUBJECTIVE:    Dada Munoz is a 30 y.o. male    Patient returns today with a known history of grade 2 diastolic dysfunction, type 2 diabetes, pulmonary hypertension, morbid obesity, malignant hypertension, learning disability, and is seen for evaluation. Current Outpatient Medications   Medication Sig Dispense Refill    cloNIDine HCL (CATAPRES) 0.3 mg tablet Take 2 Tablets by mouth nightly. 60 Tablet 11    minoxidiL (LONITEN) 10 mg tablet Take 4 Tablets by mouth two (2) times a day. 320 Tablet 5    atorvastatin (LIPITOR) 10 mg tablet 1 Tablet. furosemide (LASIX) 40 mg tablet Take 1 Tablet by mouth daily. 90 Tablet 3    ferrous gluconate 324 mg (38 mg iron) tablet TAKE 1 TABLET BY MOUTH EVERY OTHER DAY 45 Tablet 3    spironolactone (ALDACTONE) 50 mg tablet Take 1 Tablet by mouth daily. 90 Tablet 3    metFORMIN ER (GLUCOPHAGE XR) 500 mg tablet TAKE 2 TABS BY MOUTH BEFORE BREAKFAST AND DINNER. AND 1 WITH  Tablet 3    amLODIPine (NORVASC) 10 mg tablet TAKE 1 TABLET BY MOUTH EVERY DAY 90 Tablet 3    metoprolol succinate (TOPROL-XL) 200 mg XL tablet Take 1 Tablet by mouth nightly.  30 Tablet 11    isosorbide mononitrate ER (IMDUR) 30 mg tablet TAKE 1 TABLET BY MOUTH EVERY DAY IN THE MORNING 90 Tablet 3    telmisartan (MICARDIS) 80 mg tablet TAKE 1 TABLET BY MOUTH EVERY DAY 90 Tablet 3     Past Medical History:   Diagnosis Date    Blurred vision, bilateral     DM[facility]di[facility] mellitus[facility] 04/03/2018    Grade II diastolic dysfunction 30/49/5682    HTN (hypertension), malignant     Learning disability 1991    fell hit head and thereafter could not remember according to     Morbid obesity with BMI of 45.0-49.9, adult ([facility]     Non-compliance 12/10/2018    Pulmonary hypertension, moderate to severe ([facility] 12/10/2018    chip seen by par and referred adrienne lainez md     History reviewed. No pertinent surgical history. No Known Allergies      REVIEW OF SYSTEMS:  General: negative for - chills or fever  ENT: negative for - headaches, nasal congestion or tinnitus  Respiratory: negative for - cough, hemoptysis, shortness of breath or wheezing  Cardiovascular : negative for - chest pain, edema, palpitations or shortness of breath  Gastrointestinal: negative for - abdominal pain, blood in stools, heartburn or nausea/vomiting  Genito-Urinary: no dysuria, trouble voiding, or hematuria  Musculoskeletal: negative for - gait disturbance, joint pain, joint stiffness or joint swelling  Neurological: no TIA or stroke symptoms  Hematologic: no bruises, no bleeding, no swollen glands  Integument: no lumps, mole changes, nail changes or rash  Endocrine: no malaise/lethargy or unexpected weight changes      Social History     Socioeconomic History    Marital status: SINGLE   Tobacco Use    Smoking status: Former     Types: Cigarettes     Quit date: 2018     Years since quittin.3    Smokeless tobacco: Never   Vaping Use    Vaping Use: Never used   Substance and Sexual Activity    Alcohol use: No    Drug use: No    Sexual activity: Not Currently   Social History Narrative    Medical History: KeloidObesity    Surgical History: Denies Past Surgical History    Hospitalization/Major Diagnostic Procedure: Denies Past Hospitalization    Family History: Mother: alive 37 yrs a/w Father: alive 36 yrs a/w Sister(s): alive Brother(s): alive Grandmother: alive 64 yrs Jessie [place]ear[place] 3 brother(s) , 2 sister(s) . Social History: Alcohol Use Patient does not use alcohol. Smoking Status Patient is a current every day smoker, Received cessation    counseling on: 2013. Marital Status: Single. Lives w ith: grandmother.  Occupation/W ork: employed full time [employer] make [employer] .Education/School: has highschool diploma. Baptism: no.     Social Determinants of Health     Financial Resource Strain: Medium Risk    Difficulty of Paying Living Expenses: Somewhat hard   Food Insecurity: Food Insecurity Present    Worried About Running Out of Food in the Last Year: Sometimes true    Ran Out of Food in the Last Year: Never true   Transportation Needs: No Transportation Needs    Lack of Transportation (Medical): No    Lack of Transportation (Non-Medical): No   Physical Activity: Inactive    Days of Exercise per Week: 0 days    Minutes of Exercise per Session: 0 min   Housing Stability: Low Risk     Unable to Pay for Housing in the Last Year: No    Number of Places Lived in the Last Year: 1    Unstable Housing in the Last Year: No     History reviewed. No pertinent family history. OBJECTIVE:    Visit Vitals  BP (!) 149/79 (BP 1 Location: Left upper arm, BP Patient Position: Sitting)   Pulse 85   Temp 97.9 °F (36.6 °C) (Oral)   Resp 18   Ht 5' 7\" (1.702 m)   Wt 265 lb 8 oz (120.4 kg)   SpO2 97%   BMI 41.58 kg/m²     CONSTITUTIONAL: well , well nourished, appears age appropriate  EYES: perrla, eom intact  ENMT:moist mucous membranes, pharynx clear  NECK: supple. Thyroid normal  RESPIRATORY: Chest: clear bilaterally   CARDIOVASCULAR: Heart: regular rate and rhythm  GASTROINTESTINAL: Abdomen: soft, bowel sounds active  HEMATOLOGIC: no pathological lymph nodes palpated  MUSCULOSKELETAL: Extremities: no edema, pulse 1+   INTEGUMENT: No unusual rashes or suspicious skin lesions noted. Nails appear normal.  NEUROLOGIC: non-focal exam   MENTAL STATUS: alert and oriented, appropriate affect           ASSESSMENT:  1. Grade II diastolic dysfunction    2. Type 2 diabetes mellitus without complication, without long-term current use of insulin (HCC)    3. Pulmonary hypertension, moderate to severe (HCC)    4. Obesity, morbid ([address]    5. HTN (hypertension), malignant    6.  Dyslipidemia      __________ (clipped audio) decompensation with his grade 2 diastolic dysfunction. We will check hemoglobin A1c for control of his diabetes. Pulmonary hypertension is moderate to severe by echocardiogram.    Morbid obesity remains the same and we encouraged him to lose some weight, but he is having a hard time. Blood pressure control is adequate. I would like to drop it a little bit further and we will increase the Minoxidil to 40 mg in the morning, 40 mg in the evening. He will be back to see me in three months, sooner if he has any problems. I have discussed the diagnosis with the patient and the intended plan as seen in the  Orders. The patient understands and agees with the plan. The patient has   received an after visit summary and questions were answered concerning  future plans  Patient labs and/or xrays were reviewed  Past records were reviewed. PLAN:  .  Orders Placed This Encounter    HEMOGLOBIN A1C WITH EAG    RENAL FUNCTION PANEL    LIPID PANEL    cloNIDine HCL (CATAPRES) 0.3 mg tablet    minoxidiL (LONITEN) 10 mg tablet       Follow-up and Dispositions    Return in about 3 months (around 7/17/2023). ATTENTION:   This medical record was transcribed using an electronic medical records system. Although proofread, it may and can contain electronic and spelling errors. Other human spelling and other errors may be present. Corrections may be executed at a later time. Please feel free to contact us for any clarifications as needed.

## 2023-04-17 NOTE — PROGRESS NOTES
Alex Murray is a 30 y.o. male    Chief Complaint   Patient presents with    Diabetes    Hypertension     1. Have you been to the ER, urgent care clinic since your last visit? Hospitalized since your last visit? No    2. Have you seen or consulted any other health care providers outside of the [address] [place] [address] since your last visit? Include any pap smears or colon screening.  No

## 2023-04-18 RX ORDER — ATORVASTATIN CALCIUM 40 MG/1
40 TABLET, FILM COATED ORAL DAILY
Qty: 90 TABLET | Refills: 11 | Status: SHIPPED | OUTPATIENT
Start: 2023-04-18

## 2023-05-17 RX ORDER — TELMISARTAN 80 MG/1
1 TABLET ORAL DAILY
COMMUNITY
Start: 2021-06-30

## 2023-05-17 RX ORDER — ISOSORBIDE MONONITRATE 30 MG/1
1 TABLET, EXTENDED RELEASE ORAL EVERY MORNING
COMMUNITY
Start: 2021-12-29

## 2023-05-17 RX ORDER — FUROSEMIDE 40 MG/1
40 TABLET ORAL DAILY
COMMUNITY
Start: 2022-12-15

## 2023-05-17 RX ORDER — SPIRONOLACTONE 50 MG/1
50 TABLET, FILM COATED ORAL DAILY
COMMUNITY
Start: 2022-12-15

## 2023-05-17 RX ORDER — AMLODIPINE BESYLATE 10 MG/1
1 TABLET ORAL DAILY
COMMUNITY
Start: 2022-07-22

## 2023-05-17 RX ORDER — ATORVASTATIN CALCIUM 40 MG/1
40 TABLET, FILM COATED ORAL DAILY
COMMUNITY
Start: 2023-04-18

## 2023-05-17 RX ORDER — MINOXIDIL 10 MG/1
40 TABLET ORAL 2 TIMES DAILY
COMMUNITY
Start: 2023-04-17

## 2023-05-17 RX ORDER — METFORMIN HYDROCHLORIDE 500 MG/1
1000 TABLET, EXTENDED RELEASE ORAL
COMMUNITY
Start: 2022-07-22

## 2023-05-17 RX ORDER — DOXYCYCLINE HYCLATE 50 MG/1
1 CAPSULE, GELATIN COATED ORAL EVERY OTHER DAY
COMMUNITY
Start: 2022-12-15

## 2023-05-17 RX ORDER — CLONIDINE HYDROCHLORIDE 0.3 MG/1
2 TABLET ORAL NIGHTLY
COMMUNITY
Start: 2023-04-17

## 2023-05-17 RX ORDER — METOPROLOL SUCCINATE 200 MG/1
1 TABLET, EXTENDED RELEASE ORAL NIGHTLY
COMMUNITY
Start: 2022-07-20

## 2023-07-19 ENCOUNTER — OFFICE VISIT (OUTPATIENT)
Facility: CLINIC | Age: 38
End: 2023-07-19
Payer: MEDICARE

## 2023-07-19 VITALS
HEIGHT: 67 IN | RESPIRATION RATE: 18 BRPM | OXYGEN SATURATION: 100 % | DIASTOLIC BLOOD PRESSURE: 67 MMHG | BODY MASS INDEX: 42.5 KG/M2 | SYSTOLIC BLOOD PRESSURE: 122 MMHG | HEART RATE: 73 BPM | TEMPERATURE: 98.3 F | WEIGHT: 270.8 LBS

## 2023-07-19 DIAGNOSIS — R06.02 SOB (SHORTNESS OF BREATH): ICD-10-CM

## 2023-07-19 DIAGNOSIS — F81.9 LEARNING DISABILITY: ICD-10-CM

## 2023-07-19 DIAGNOSIS — I51.89 GRADE II DIASTOLIC DYSFUNCTION: ICD-10-CM

## 2023-07-19 DIAGNOSIS — I27.20 PULMONARY HYPERTENSION, MODERATE TO SEVERE (HCC): ICD-10-CM

## 2023-07-19 DIAGNOSIS — E11.9 TYPE 2 DIABETES MELLITUS WITHOUT COMPLICATION, WITHOUT LONG-TERM CURRENT USE OF INSULIN (HCC): Primary | ICD-10-CM

## 2023-07-19 DIAGNOSIS — E66.01 OBESITY, MORBID (HCC): ICD-10-CM

## 2023-07-19 DIAGNOSIS — I10 HTN (HYPERTENSION), MALIGNANT: ICD-10-CM

## 2023-07-19 PROCEDURE — 3078F DIAST BP <80 MM HG: CPT | Performed by: INTERNAL MEDICINE

## 2023-07-19 PROCEDURE — 2022F DILAT RTA XM EVC RTNOPTHY: CPT | Performed by: INTERNAL MEDICINE

## 2023-07-19 PROCEDURE — 1036F TOBACCO NON-USER: CPT | Performed by: INTERNAL MEDICINE

## 2023-07-19 PROCEDURE — 36415 COLL VENOUS BLD VENIPUNCTURE: CPT | Performed by: INTERNAL MEDICINE

## 2023-07-19 PROCEDURE — 3074F SYST BP LT 130 MM HG: CPT | Performed by: INTERNAL MEDICINE

## 2023-07-19 PROCEDURE — G8417 CALC BMI ABV UP PARAM F/U: HCPCS | Performed by: INTERNAL MEDICINE

## 2023-07-19 PROCEDURE — 99214 OFFICE O/P EST MOD 30 MIN: CPT | Performed by: INTERNAL MEDICINE

## 2023-07-19 PROCEDURE — G8427 DOCREV CUR MEDS BY ELIG CLIN: HCPCS | Performed by: INTERNAL MEDICINE

## 2023-07-19 PROCEDURE — 3044F HG A1C LEVEL LT 7.0%: CPT | Performed by: INTERNAL MEDICINE

## 2023-07-19 SDOH — ECONOMIC STABILITY: TRANSPORTATION INSECURITY
IN THE PAST 12 MONTHS, HAS LACK OF TRANSPORTATION KEPT YOU FROM MEETINGS, WORK, OR FROM GETTING THINGS NEEDED FOR DAILY LIVING?: NO

## 2023-07-19 SDOH — ECONOMIC STABILITY: FOOD INSECURITY: WITHIN THE PAST 12 MONTHS, YOU WORRIED THAT YOUR FOOD WOULD RUN OUT BEFORE YOU GOT MONEY TO BUY MORE.: NEVER TRUE

## 2023-07-19 SDOH — ECONOMIC STABILITY: FOOD INSECURITY: WITHIN THE PAST 12 MONTHS, THE FOOD YOU BOUGHT JUST DIDN'T LAST AND YOU DIDN'T HAVE MONEY TO GET MORE.: NEVER TRUE

## 2023-07-19 SDOH — ECONOMIC STABILITY: HOUSING INSECURITY
IN THE LAST 12 MONTHS, WAS THERE A TIME WHEN YOU DID NOT HAVE A STEADY PLACE TO SLEEP OR SLEPT IN A SHELTER (INCLUDING NOW)?: NO

## 2023-07-19 SDOH — ECONOMIC STABILITY: TRANSPORTATION INSECURITY
IN THE PAST 12 MONTHS, HAS THE LACK OF TRANSPORTATION KEPT YOU FROM MEDICAL APPOINTMENTS OR FROM GETTING MEDICATIONS?: NO

## 2023-07-19 SDOH — HEALTH STABILITY: PHYSICAL HEALTH: ON AVERAGE, HOW MANY MINUTES DO YOU ENGAGE IN EXERCISE AT THIS LEVEL?: 10 MIN

## 2023-07-19 SDOH — ECONOMIC STABILITY: HOUSING INSECURITY: IN THE LAST 12 MONTHS, HOW MANY PLACES HAVE YOU LIVED?: 1

## 2023-07-19 SDOH — HEALTH STABILITY: PHYSICAL HEALTH: ON AVERAGE, HOW MANY DAYS PER WEEK DO YOU ENGAGE IN MODERATE TO STRENUOUS EXERCISE (LIKE A BRISK WALK)?: 2 DAYS

## 2023-07-19 SDOH — ECONOMIC STABILITY: INCOME INSECURITY: IN THE LAST 12 MONTHS, WAS THERE A TIME WHEN YOU WERE NOT ABLE TO PAY THE MORTGAGE OR RENT ON TIME?: NO

## 2023-07-19 ASSESSMENT — PATIENT HEALTH QUESTIONNAIRE - PHQ9
SUM OF ALL RESPONSES TO PHQ QUESTIONS 1-9: 0
1. LITTLE INTEREST OR PLEASURE IN DOING THINGS: 0
SUM OF ALL RESPONSES TO PHQ QUESTIONS 1-9: 0
SUM OF ALL RESPONSES TO PHQ9 QUESTIONS 1 & 2: 0
SUM OF ALL RESPONSES TO PHQ QUESTIONS 1-9: 0
SUM OF ALL RESPONSES TO PHQ QUESTIONS 1-9: 0
2. FEELING DOWN, DEPRESSED OR HOPELESS: 0

## 2023-07-19 ASSESSMENT — SOCIAL DETERMINANTS OF HEALTH (SDOH)
HOW OFTEN DO YOU ATTENT MEETINGS OF THE CLUB OR ORGANIZATION YOU BELONG TO?: NEVER
DO YOU BELONG TO ANY CLUBS OR ORGANIZATIONS SUCH AS CHURCH GROUPS UNIONS, FRATERNAL OR ATHLETIC GROUPS, OR SCHOOL GROUPS?: NO
HOW HARD IS IT FOR YOU TO PAY FOR THE VERY BASICS LIKE FOOD, HOUSING, MEDICAL CARE, AND HEATING?: NOT HARD AT ALL
WITHIN THE LAST YEAR, HAVE TO BEEN RAPED OR FORCED TO HAVE ANY KIND OF SEXUAL ACTIVITY BY YOUR PARTNER OR EX-PARTNER?: NO
ARE YOU MARRIED, WIDOWED, DIVORCED, SEPARATED, NEVER MARRIED, OR LIVING WITH A PARTNER?: NEVER MARRIED
HOW OFTEN DO YOU GET TOGETHER WITH FRIENDS OR RELATIVES?: MORE THAN THREE TIMES A WEEK
HOW OFTEN DO YOU ATTEND CHURCH OR RELIGIOUS SERVICES?: 1 TO 4 TIMES PER YEAR
IN A TYPICAL WEEK, HOW MANY TIMES DO YOU TALK ON THE PHONE WITH FAMILY, FRIENDS, OR NEIGHBORS?: MORE THAN THREE TIMES A WEEK
WITHIN THE LAST YEAR, HAVE YOU BEEN KICKED, HIT, SLAPPED, OR OTHERWISE PHYSICALLY HURT BY YOUR PARTNER OR EX-PARTNER?: NO
WITHIN THE LAST YEAR, HAVE YOU BEEN HUMILIATED OR EMOTIONALLY ABUSED IN OTHER WAYS BY YOUR PARTNER OR EX-PARTNER?: NO
WITHIN THE LAST YEAR, HAVE YOU BEEN AFRAID OF YOUR PARTNER OR EX-PARTNER?: NO

## 2023-07-19 ASSESSMENT — ANXIETY QUESTIONNAIRES
1. FEELING NERVOUS, ANXIOUS, OR ON EDGE: 0
GAD7 TOTAL SCORE: 0
4. TROUBLE RELAXING: 0
IF YOU CHECKED OFF ANY PROBLEMS ON THIS QUESTIONNAIRE, HOW DIFFICULT HAVE THESE PROBLEMS MADE IT FOR YOU TO DO YOUR WORK, TAKE CARE OF THINGS AT HOME, OR GET ALONG WITH OTHER PEOPLE: NOT DIFFICULT AT ALL
2. NOT BEING ABLE TO STOP OR CONTROL WORRYING: 0
6. BECOMING EASILY ANNOYED OR IRRITABLE: 0
3. WORRYING TOO MUCH ABOUT DIFFERENT THINGS: 0
5. BEING SO RESTLESS THAT IT IS HARD TO SIT STILL: 0
7. FEELING AFRAID AS IF SOMETHING AWFUL MIGHT HAPPEN: 0

## 2023-07-19 ASSESSMENT — LIFESTYLE VARIABLES
HOW OFTEN DO YOU HAVE A DRINK CONTAINING ALCOHOL: NEVER
HOW MANY STANDARD DRINKS CONTAINING ALCOHOL DO YOU HAVE ON A TYPICAL DAY: PATIENT DOES NOT DRINK

## 2023-07-20 LAB
ANION GAP SERPL CALC-SCNC: 6 MMOL/L (ref 5–15)
BUN SERPL-MCNC: 22 MG/DL (ref 6–20)
BUN/CREAT SERPL: 15 (ref 12–20)
CALCIUM SERPL-MCNC: 9.7 MG/DL (ref 8.5–10.1)
CHLORIDE SERPL-SCNC: 105 MMOL/L (ref 97–108)
CO2 SERPL-SCNC: 26 MMOL/L (ref 21–32)
COMMENT:: NORMAL
CREAT SERPL-MCNC: 1.43 MG/DL (ref 0.7–1.3)
EST. AVERAGE GLUCOSE BLD GHB EST-MCNC: 137 MG/DL
GLUCOSE SERPL-MCNC: 117 MG/DL (ref 65–100)
HBA1C MFR BLD: 6.4 % (ref 4–5.6)
NT PRO BNP: 179 PG/ML
POTASSIUM SERPL-SCNC: 4.6 MMOL/L (ref 3.5–5.1)
SODIUM SERPL-SCNC: 137 MMOL/L (ref 136–145)
SPECIMEN HOLD: NORMAL

## 2023-08-02 RX ORDER — AMLODIPINE BESYLATE 10 MG/1
TABLET ORAL
Qty: 90 TABLET | Refills: 3 | Status: SHIPPED | OUTPATIENT
Start: 2023-08-02

## 2024-06-13 ENCOUNTER — TELEPHONE (OUTPATIENT)
Facility: CLINIC | Age: 39
End: 2024-06-13

## 2024-06-13 NOTE — TELEPHONE ENCOUNTER
attempted to call patient no answer left message on her VM due for DM follow up and to call us back [phone] or call the office directly [phone].

## 2024-12-14 RX ORDER — FERROUS GLUCONATE 324(38)MG
1 TABLET ORAL EVERY OTHER DAY
Qty: 45 TABLET | Refills: 3 | OUTPATIENT
Start: 2024-12-14

## 2024-12-14 RX ORDER — FUROSEMIDE 40 MG/1
40 TABLET ORAL DAILY
Qty: 90 TABLET | Refills: 3 | OUTPATIENT
Start: 2024-12-14

## 2024-12-14 RX ORDER — CLONIDINE HYDROCHLORIDE 0.3 MG/1
TABLET ORAL
Qty: 180 TABLET | Refills: 3 | OUTPATIENT
Start: 2024-12-14

## 2024-12-14 RX ORDER — MINOXIDIL 10 MG/1
TABLET ORAL
Qty: 720 TABLET | Refills: 2 | OUTPATIENT
Start: 2024-12-14

## 2024-12-14 RX ORDER — AMLODIPINE BESYLATE 10 MG/1
TABLET ORAL
Qty: 90 TABLET | Refills: 3 | OUTPATIENT
Start: 2024-12-14

## 2024-12-14 RX ORDER — ATORVASTATIN CALCIUM 40 MG/1
TABLET, FILM COATED ORAL
Qty: 90 TABLET | Refills: 11 | OUTPATIENT
Start: 2024-12-14

## 2024-12-14 RX ORDER — SPIRONOLACTONE 50 MG/1
50 TABLET, FILM COATED ORAL DAILY
Qty: 90 TABLET | Refills: 3 | OUTPATIENT
Start: 2024-12-14